# Patient Record
Sex: MALE | Race: WHITE | NOT HISPANIC OR LATINO | Employment: OTHER | URBAN - METROPOLITAN AREA
[De-identification: names, ages, dates, MRNs, and addresses within clinical notes are randomized per-mention and may not be internally consistent; named-entity substitution may affect disease eponyms.]

---

## 2017-01-06 ENCOUNTER — OFFICE VISIT (OUTPATIENT)
Dept: ENDOCRINOLOGY | Age: 77
End: 2017-01-06

## 2017-01-06 VITALS
HEIGHT: 70 IN | HEART RATE: 54 BPM | BODY MASS INDEX: 38.57 KG/M2 | OXYGEN SATURATION: 98 % | WEIGHT: 269.4 LBS | DIASTOLIC BLOOD PRESSURE: 80 MMHG | SYSTOLIC BLOOD PRESSURE: 110 MMHG

## 2017-01-06 DIAGNOSIS — IMO0002 UNCONTROLLED TYPE 2 DIABETES MELLITUS WITH COMPLICATION, WITH LONG-TERM CURRENT USE OF INSULIN: Primary | ICD-10-CM

## 2017-01-06 DIAGNOSIS — N18.30 CHRONIC KIDNEY DISEASE, STAGE 3 (MODERATE): ICD-10-CM

## 2017-01-06 DIAGNOSIS — Z79.4 ENCOUNTER FOR LONG-TERM (CURRENT) USE OF INSULIN (HCC): ICD-10-CM

## 2017-01-06 DIAGNOSIS — R63.5 ABNORMAL WEIGHT GAIN: ICD-10-CM

## 2017-01-06 DIAGNOSIS — E16.1 HYPERINSULINISM: ICD-10-CM

## 2017-01-06 DIAGNOSIS — IMO0002 UNCONTROLLED TYPE II DIABETES MELLITUS WITH NEPHROPATHY: ICD-10-CM

## 2017-01-06 PROCEDURE — 99214 OFFICE O/P EST MOD 30 MIN: CPT | Performed by: INTERNAL MEDICINE

## 2017-01-06 NOTE — PROGRESS NOTES
76 y.o.    Patient Care Team:  Lopez Salas MD as PCP - General (Internal Medicine)    Chief Complaint:      F/U TYPE 2 DIABETES, UNCONTROLLED.  NO RECENT LABS.  Subjective     HPI  76 yr old white male with PMHx of uncontrolled T2DM came for followup  Currently on Lantus 50 units at hs and Novolog 6 units tidac  Reports Bg have improved significantly  No hypoglycemia recently but fasting BG have been trending down    UCT2DM with neuropathy  Symptomatic in both feet  UCT2DM with neprhopathy and elevated creatinine  Abnormal weight gain  Not able to lose weight yet  Started walking regularly and wants to join [facility]    Interval History  Patient 75-year-old white male with a past medical history of uncontrolled type 2 diabetes mellitus came for followup.  He's a former patient of Dr. Mendosa  Summary  Patient was diagnosed with uncontrolled type 2 diabetes mellitus in 1997 and has been on medication ever since.      He currently takes Lantus 50 units at night. No other medication for diabetes. He used to be on Byetta and Humalog but apparently due to insurance issues he discontinued both of them. He has been focused on diet and activity and exercise and is managing to keep his blood sugars below 120. For the past few months.. 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 He has no sores or lesions on the feet      Uncontrolled type 2 diabetes mellitus with nephropathy and chronic kidney disease.  Patient has regular follow-up with a nephrologist     Patient denied any knowledge of diabetic retinopathy.  Patient had occasional episodes of hypoglycemia with a blood sugar dropping and to 70s and he becomes symptomatic  He started walking 2 miles every other day and has been controlling his diet very strictly.      Abnormal weight  gain  Patient is also gained significant weight over the past several years. He currently weighs 274 pounds and is actively planning to lose weight    The following portions of the patient's history were reviewed and updated as appropriate: allergies, current medications, past family history, past medical history, past social history, past surgical history and problem list.    Past Medical History   Diagnosis Date   • CKD (chronic kidney disease)    • Hyperlipidemia    • Hypertension    • Type 2 diabetes mellitus      Family History   Problem Relation Age of Onset   • Cancer Mother    • Heart disease Father      Social History     Social History   • Marital status:      Spouse name: N/A   • Number of children: N/A   • Years of education: N/A     Occupational History   • Not on file.     Social History Main Topics   • Smoking status: Former Smoker   • Smokeless tobacco: Not on file   • Alcohol use No   • Drug use: No   • Sexual activity: Not on file     Other Topics Concern   • Not on file     Social History Narrative     Allergies   Allergen Reactions   • Morphine And Related    • Nabumetone Rash       Current Outpatient Prescriptions:   •  aspirin 325 MG tablet, Take  by mouth daily., Disp: , Rfl:   •  atenolol (TENORMIN) 25 MG tablet, Take 1 tablet by mouth daily., Disp: 90 tablet, Rfl: 1  •  atorvastatin (LIPITOR) 40 MG tablet, Take 1 tablet by mouth daily., Disp: 90 tablet, Rfl: 3  •  fluticasone (FLONASE) 50 MCG/ACT nasal spray, , Disp: , Rfl:   •  furosemide (LASIX) 20 MG tablet, Take 1 tablet by mouth 2 (Two) Times a Day., Disp: 30 tablet, Rfl: 1  •  glucose blood test strip, OneTouch Ultra Blue In Vitro Strip; Patient Sig: OneTouch Ultra Blue In Vitro Strip USE 1 KIT; 0; 06-Mar-2014; Active, Disp: , Rfl:   •  insulin aspart (NOVOLOG FLEXPEN) 100 UNIT/ML solution pen-injector sc pen, Inject 6 Units under the skin 3 (three) times a day with meals., Disp: 10 pen, Rfl: 1  •  insulin glargine (LANTUS) 100  "UNIT/ML injection, Inject 50 Units under the skin., Disp: , Rfl:   •  Insulin Syringe-Needle U-100 30G X 1/2\" 0.5 ML misc, , Disp: , Rfl:   •  Multiple Vitamin (MULTI VITAMIN DAILY PO), Take  by mouth., Disp: , Rfl:   •  potassium chloride (KLOR-CON) 10 MEQ CR tablet, Take 1 tablet by mouth Daily., Disp: 30 tablet, Rfl: 1        Review of Systems   Constitutional: Negative for chills, fatigue and fever.   Cardiovascular: Negative for chest pain and palpitations.   Gastrointestinal: Negative for abdominal pain, constipation, diarrhea, nausea and vomiting.   Endocrine: Negative for cold intolerance and heat intolerance.   All other systems reviewed and are negative.      Objective       Vitals:    01/06/17 1034   BP: 110/80   Pulse: 54   SpO2: 98%   Weight: 269 lb 6.4 oz (122 kg)   Height: 70\" (177.8 cm)     Body mass index is 38.65 kg/(m^2).      Physical Exam   Constitutional: He is oriented to person, place, and time. He appears well-developed and well-nourished.   HENT:   Head: Normocephalic and atraumatic.   Eyes: EOM are normal. Pupils are equal, round, and reactive to light.   Neck: Normal range of motion. Neck supple. No thyromegaly present.   Cardiovascular: Normal rate, regular rhythm, normal heart sounds and intact distal pulses.    Pulmonary/Chest: Effort normal and breath sounds normal.   Abdominal: Soft. Bowel sounds are normal. He exhibits distension. There is no tenderness.   Lymphadenopathy:     He has no cervical adenopathy.   Neurological: He is alert and oriented to person, place, and time. He has normal reflexes.   Skin: Skin is warm and dry.   Psychiatric: He has a normal mood and affect. His behavior is normal.   Nursing note and vitals reviewed.    Results Review:     I reviewed the patient's new clinical results.    Medical records reviewed  Summary:      Office Visit on 09/02/2016   Component Date Value Ref Range Status   • Glucose 09/02/2016 135* 65 - 99 mg/dL Final   • BUN 09/02/2016 25  8 " - 27 mg/dL Final   • Creatinine 09/02/2016 1.24  0.76 - 1.27 mg/dL Final   • eGFR Non  Am 09/02/2016 56* >59 mL/min/1.73 Final   • eGFR African Am 09/02/2016 65  >59 mL/min/1.73 Final   • BUN/Creatinine Ratio 09/02/2016 20  10 - 22 Final   • Sodium 09/02/2016 138  134 - 144 mmol/L Final   • Potassium 09/02/2016 4.9  3.5 - 5.2 mmol/L Final   • Chloride 09/02/2016 102  97 - 108 mmol/L Final   • Total CO2 09/02/2016 20  18 - 29 mmol/L Final   • Calcium 09/02/2016 10.0  8.6 - 10.2 mg/dL Final   • Total Protein 09/02/2016 6.5  6.0 - 8.5 g/dL Final   • Albumin 09/02/2016 4.2  3.5 - 4.8 g/dL Final   • Globulin 09/02/2016 2.3  1.5 - 4.5 g/dL Final   • A/G Ratio 09/02/2016 1.8  1.1 - 2.5 Final   • Total Bilirubin 09/02/2016 1.2  0.0 - 1.2 mg/dL Final   • Alkaline Phosphatase 09/02/2016 83  39 - 117 IU/L Final   • AST (SGOT) 09/02/2016 22  0 - 40 IU/L Final   • ALT (SGPT) 09/02/2016 19  0 - 44 IU/L Final   • Hemoglobin A1C 09/02/2016 8.6* 4.8 - 5.6 % Final    Comment:          Pre-diabetes: 5.7 - 6.4           Diabetes: >6.4           Glycemic control for adults with diabetes: <7.0     • Creatinine, Urine 09/02/2016 94.1  Not Estab. mg/dL Final   • Microalbumin, Urine 09/02/2016 48.3  Not Estab. ug/mL Final   • Microalbumin/Creatinine Ratio Urine 09/02/2016 51.3* 0.0 - 30.0 mg/g creat Final     Lab Results   Component Value Date    HGBA1C 8.6 (H) 09/02/2016    HGBA1C 8.4 (H) 06/02/2016    HGBA1C 8.2 (H) 03/18/2016     Lab Results   Component Value Date    MICROALBUR 48.3 09/02/2016    CREATININE 1.24 09/02/2016     Imaging Results (most recent)     None                Assessment and Plan:    Claude was seen today for diabetes.    Diagnoses and all orders for this visit:    Uncontrolled type 2 diabetes mellitus with complication, with long-term current use of insulin  -     Comprehensive Metabolic Panel  -     Hemoglobin A1c  -     TSH  -     Microalbumin / Creatinine Urine Ratio    Uncontrolled type II diabetes  mellitus with nephropathy  -     Comprehensive Metabolic Panel  -     Hemoglobin A1c  -     TSH  -     Microalbumin / Creatinine Urine Ratio    Chronic kidney disease, stage 3 (moderate)  -     Comprehensive Metabolic Panel  -     Hemoglobin A1c  -     TSH  -     Microalbumin / Creatinine Urine Ratio    Hyperinsulinism  -     Comprehensive Metabolic Panel  -     Hemoglobin A1c  -     TSH  -     Microalbumin / Creatinine Urine Ratio    Encounter for long-term (current) use of insulin  -     Comprehensive Metabolic Panel  -     Hemoglobin A1c  -     TSH  -     Microalbumin / Creatinine Urine Ratio    Abnormal weight gain  -     Comprehensive Metabolic Panel  -     Hemoglobin A1c  -     TSH  -     Microalbumin / Creatinine Urine Ratio        Patient's glucometer download reviewed  Blood sugars are fluctuating from  for the most part  They appeared to be much improved  He is able to take NovoLog 6 units before each meal and reports that his blood sugars have improved    I advised him to decrease the Lantus to 44 units at night  Continue NovoLog 6 units before each meal    Patient is currently focused on weight loss  He plans to join [employer]  He is currently exercising 40 minutes every day.    Patient will get lab testing done today  He will return to follow-up in 3 months      Layton Jolly MD. FACE    01/06/17      EMR Dragon / transcription disclaimer:    Much of this encounter note is an electronic transcription/ translation of spoken language to printed text.  Electronic translation of spoken language may permit erroneous, or at times, nonsensical words or phrases to be inadvertently transcribed; although I have reviewed the note for such errors, some may still exist.

## 2017-01-06 NOTE — PATIENT INSTRUCTIONS
Patient instructions  Decrease Lantus to 44 units at bedtime  Continue NovoLog 6 units before each meal

## 2017-01-06 NOTE — MR AVS SNAPSHOT
"                        Claude E Johnson   1/6/2017 10:30 AM   Office Visit    Dept Phone:  [phone]   Encounter #:  [id number]    Provider:  Layton FISHMAN MD   Department:  [facility] ENDOCRINOLOGY                Your Full Care Plan              Your Updated Medication List          This list is accurate as of: 1/6/17 11:24 AM.  Always use your most recent med list.                aspirin 325 MG tablet       atenolol 25 MG tablet   Commonly known as:  TENORMIN   Take 1 tablet by mouth daily.       atorvastatin 40 MG tablet   Commonly known as:  LIPITOR   Take 1 tablet by mouth daily.       fluticasone 50 MCG/ACT nasal spray   Commonly known as:  FLONASE       furosemide 20 MG tablet   Commonly known as:  LASIX   Take 1 tablet by mouth 2 (Two) Times a Day.       glucose blood test strip       insulin aspart 100 UNIT/ML solution pen-injector sc pen   Commonly known as:  NOVOLOG FLEXPEN   Inject 6 Units under the skin 3 (three) times a day with meals.       insulin glargine 100 UNIT/ML injection   Commonly known as:  LANTUS       Insulin Syringe-Needle U-100 30G X 1/2\" 0.5 ML misc       MULTI VITAMIN DAILY PO       potassium chloride 10 MEQ CR tablet   Commonly known as:  KLOR-CON   Take 1 tablet by mouth Daily.               We Performed the Following     Comprehensive Metabolic Panel     Hemoglobin A1c     Microalbumin / Creatinine Urine Ratio     TSH       You Were Diagnosed With        Codes Comments    Uncontrolled type 2 diabetes mellitus with complication, with long-term current use of insulin    -  Primary ICD-10-CM: E11.8, E11.65, Z79.4  ICD-9-CM: 250.82, V58.67     Uncontrolled type II diabetes mellitus with nephropathy     ICD-10-CM: E11.21, E11.65  ICD-9-CM: 250.42, 583.81     Chronic kidney disease, stage 3 (moderate)     ICD-10-CM: N18.3  ICD-9-CM: 585.3     Hyperinsulinism     ICD-10-CM: E16.1  ICD-9-CM: 251.1     Encounter for long-term (current) use of insulin     " ICD-10-CM: Z79.4  ICD-9-CM: V58.67     Abnormal weight gain     ICD-10-CM: R63.5  ICD-9-CM: 783.1       Instructions    Patient instructions  Decrease Lantus to 44 units at bedtime  Continue NovoLog 6 units before each meal     Patient Instructions History      Upcoming Appointments     Visit Type Date Time Department    OFFICE VISIT 2017 10:30 AM [facility]    FOLLOW UP 2017  9:45 AM [facility]    OFFICE VISIT 2017  1:15 PM [facility]      [employer] Signup     [facility] [employer] allows you to send messages to your doctor, view your test results, renew your prescriptions, schedule appointments, and more. To sign up, go to [employer] and click on the Sign Up Now link in the New User? box. Enter your [employer] Activation Code exactly as it appears below along with the last four digits of your Social Security Number and your Date of Birth () to complete the sign-up process. If you do not sign up before the expiration date, you must request a new code.    [employer] Activation Code: [email or web address]  Expires: 2017 11:23 AM    If you have questions, you can email [email or web address] or call [phone] to talk to our [employer] staff. Remember, [employer] is NOT to be used for urgent needs. For medical emergencies, dial 911.               Other Info from Your Visit           Your Appointments     2017  9:45 AM EST   Follow Up with ESTEE Lux   [facility] INTERNAL MEDICINE (--)    [address]  [facility] [address] [phone]           Arrive 15 minutes prior to appointment.            2017  1:15 PM EDT   Office Visit with Layton FISHMAN MD   [facility] ENDOCRINOLOGY (--)    [address]  [facility] [address] [phone]           Arrive 15 minutes prior to appointment.              Allergies     Morphine And Related      Nabumetone  Rash      Reason for  "Visit     Diabetes           Vital Signs     Blood Pressure Pulse Height Weight Oxygen Saturation Body Mass Index    110/80 54 70\" (177.8 cm) 269 lb 6.4 oz (122 kg) 98% 38.65 kg/m2    Smoking Status                   Former Smoker           Problems and Diagnoses Noted     Abnormal weight gain    Chronic kidney disease    Encounter for long-term (current) use of insulin    Hyperinsulinism    Uncontrolled type 2 diabetes mellitus with complication, with long-term current use of insulin    Uncontrolled type II diabetes mellitus with nephropathy        "

## 2017-01-07 LAB
ALBUMIN SERPL-MCNC: 4.4 G/DL (ref 3.5–5.2)
ALBUMIN/CREAT UR: 65.5 MG/G CREAT (ref 0–30)
ALBUMIN/GLOB SERPL: 2.1 G/DL
ALP SERPL-CCNC: 84 U/L (ref 39–117)
ALT SERPL-CCNC: 19 U/L (ref 1–41)
AST SERPL-CCNC: 15 U/L (ref 1–40)
BILIRUB SERPL-MCNC: 0.6 MG/DL (ref 0.1–1.2)
BUN SERPL-MCNC: 34 MG/DL (ref 8–23)
BUN/CREAT SERPL: 29.1 (ref 7–25)
CALCIUM SERPL-MCNC: 9.7 MG/DL (ref 8.6–10.5)
CHLORIDE SERPL-SCNC: 108 MMOL/L (ref 98–107)
CO2 SERPL-SCNC: 23.4 MMOL/L (ref 22–29)
CREAT SERPL-MCNC: 1.17 MG/DL (ref 0.76–1.27)
CREAT UR-MCNC: 101 MG/DL
GLOBULIN SER CALC-MCNC: 2.1 GM/DL
GLUCOSE SERPL-MCNC: 95 MG/DL (ref 65–99)
HBA1C MFR BLD: 7 % (ref 4.8–5.6)
MICROALBUMIN UR-MCNC: 66.2 UG/ML
POTASSIUM SERPL-SCNC: 5.1 MMOL/L (ref 3.5–5.2)
PROT SERPL-MCNC: 6.5 G/DL (ref 6–8.5)
SODIUM SERPL-SCNC: 145 MMOL/L (ref 136–145)
TSH SERPL DL<=0.005 MIU/L-ACNC: 2.92 MIU/ML (ref 0.27–4.2)

## 2017-02-06 ENCOUNTER — OFFICE VISIT (OUTPATIENT)
Dept: INTERNAL MEDICINE | Facility: CLINIC | Age: 77
End: 2017-02-06

## 2017-02-06 VITALS
DIASTOLIC BLOOD PRESSURE: 82 MMHG | OXYGEN SATURATION: 98 % | WEIGHT: 267 LBS | HEIGHT: 70 IN | SYSTOLIC BLOOD PRESSURE: 124 MMHG | BODY MASS INDEX: 38.22 KG/M2 | HEART RATE: 45 BPM

## 2017-02-06 DIAGNOSIS — E78.00 HYPERCHOLESTEROLEMIA: ICD-10-CM

## 2017-02-06 DIAGNOSIS — G47.00 INSOMNIA, UNSPECIFIED TYPE: ICD-10-CM

## 2017-02-06 DIAGNOSIS — I10 HYPERTENSION, BENIGN: Primary | ICD-10-CM

## 2017-02-06 DIAGNOSIS — Z23 NEED FOR STREPTOCOCCUS PNEUMONIAE VACCINATION: ICD-10-CM

## 2017-02-06 DIAGNOSIS — E11.40 TYPE 2 DIABETES MELLITUS WITH DIABETIC NEUROPATHY, WITH LONG-TERM CURRENT USE OF INSULIN (HCC): ICD-10-CM

## 2017-02-06 DIAGNOSIS — Z79.4 TYPE 2 DIABETES MELLITUS WITH DIABETIC NEUROPATHY, WITH LONG-TERM CURRENT USE OF INSULIN (HCC): ICD-10-CM

## 2017-02-06 PROCEDURE — 99214 OFFICE O/P EST MOD 30 MIN: CPT | Performed by: NURSE PRACTITIONER

## 2017-02-06 RX ORDER — NIACIN 500 MG/1
TABLET, EXTENDED RELEASE ORAL
COMMUNITY
Start: 2017-01-22 | End: 2017-04-17 | Stop reason: SDUPTHER

## 2017-02-07 NOTE — PROGRESS NOTES
Subjective   Claude E Johnson is a 76 y.o. male.         HPI Comments: His glucose has improved dramatically on current regimen, denies hypoglycemic episodes.  His biggest complaint is frequent awakenings throughout the night (usually to urinate) with difficulty going back to sleep. He has tried several medications and has declined additional meds for now. He reports feeling well throughout the day without lingering fatigue and malaise.    Hypertension   This is a chronic problem. The current episode started more than 1 year ago. The problem is unchanged. The problem is controlled. Pertinent negatives include no anxiety, chest pain, headaches, palpitations or peripheral edema. There are no associated agents to hypertension. Past treatments include beta blockers. The current treatment provides significant improvement. There are no compliance problems.    Hyperlipidemia   This is a chronic problem. The current episode started more than 1 year ago. The problem is controlled. Pertinent negatives include no chest pain. Current antihyperlipidemic treatment includes statins (tolerating the atorvastatin). The current treatment provides significant improvement of lipids. There are no compliance problems (denies myalgias with med).  Risk factors for coronary artery disease include diabetes mellitus.   Diabetes   He presents for his follow-up diabetic visit. He has type 2 diabetes mellitus. His disease course has been improving. There are no hypoglycemic associated symptoms. Pertinent negatives for hypoglycemia include no headaches. Associated symptoms include foot paresthesias. Pertinent negatives for diabetes include no chest pain, no fatigue and no weakness. Symptoms are improving.        The following portions of the patient's history were reviewed and updated as appropriate: allergies, current medications, past social history and problem list.    Past Medical History   Diagnosis Date   • CKD (chronic kidney disease)    •  "Hyperlipidemia    • Hypertension    • Type 2 diabetes mellitus          Current Outpatient Prescriptions:   •  aspirin 325 MG tablet, Take  by mouth daily., Disp: , Rfl:   •  atenolol (TENORMIN) 25 MG tablet, Take 1 tablet by mouth daily., Disp: 90 tablet, Rfl: 1  •  atorvastatin (LIPITOR) 40 MG tablet, Take 1 tablet by mouth daily., Disp: 90 tablet, Rfl: 3  •  fluticasone (FLONASE) 50 MCG/ACT nasal spray, , Disp: , Rfl:   •  furosemide (LASIX) 20 MG tablet, Take 1 tablet by mouth 2 (Two) Times a Day., Disp: 30 tablet, Rfl: 1  •  glucose blood test strip, OneTouch Ultra Blue In Vitro Strip; Patient Sig: OneTouch Ultra Blue In Vitro Strip USE 1 KIT; 0; 06-Mar-2014; Active, Disp: , Rfl:   •  insulin aspart (NOVOLOG FLEXPEN) 100 UNIT/ML solution pen-injector sc pen, Inject 6 Units under the skin 3 (three) times a day with meals., Disp: 10 pen, Rfl: 1  •  insulin glargine (LANTUS) 100 UNIT/ML injection, Inject 50 Units under the skin. 44 units daily, Disp: , Rfl:   •  Insulin Syringe-Needle U-100 30G X 1/2\" 0.5 ML misc, , Disp: , Rfl:   •  Multiple Vitamin (MULTI VITAMIN DAILY PO), Take  by mouth., Disp: , Rfl:   •  niacin (NIASPAN) 500 MG CR tablet, , Disp: , Rfl:   •  potassium chloride (KLOR-CON) 10 MEQ CR tablet, Take 1 tablet by mouth Daily., Disp: 30 tablet, Rfl: 1    Allergies   Allergen Reactions   • Morphine And Related    • Nabumetone Rash       Review of Systems   Constitutional: Negative for chills, fatigue, fever and unexpected weight change.   HENT: Negative for congestion, ear pain, postnasal drip, sinus pressure, sore throat and trouble swallowing.    Eyes: Negative for visual disturbance.   Respiratory: Negative for cough, chest tightness and wheezing.    Cardiovascular: Negative for chest pain, palpitations and leg swelling.   Gastrointestinal: Negative for abdominal pain, blood in stool, nausea and vomiting.   Endocrine: Negative for cold intolerance and heat intolerance.   Genitourinary: Negative for " "dysuria, frequency and urgency.   Musculoskeletal: Negative for arthralgias and joint swelling.   Skin: Negative for color change.   Allergic/Immunologic: Negative for immunocompromised state.   Neurological: Negative for syncope, weakness and headaches.   Hematological: Does not bruise/bleed easily.       Objective   Vitals:    02/06/17 0942   BP: 124/82   BP Location: Left arm   Patient Position: Sitting   Cuff Size: Adult   Pulse: (!) 45   SpO2: 98%   Weight: 267 lb (121 kg)   Height: 70\" (177.8 cm)     Physical Exam   Constitutional: He is oriented to person, place, and time. He appears well-developed and well-nourished. No distress.   HENT:   Head: Normocephalic and atraumatic.   Right Ear: External ear normal.   Left Ear: External ear normal.   Eyes: Conjunctivae are normal.   Neck: Normal range of motion. Neck supple.   Cardiovascular: Normal rate, regular rhythm, normal heart sounds and intact distal pulses.  Exam reveals no gallop and no friction rub.    No murmur heard.  Pulmonary/Chest: Effort normal and breath sounds normal. No respiratory distress.   Lymphadenopathy:     He has no cervical adenopathy.   Neurological: He is alert and oriented to person, place, and time.   Skin: Skin is warm and dry. No rash noted. No erythema.   Psychiatric: He has a normal mood and affect. His behavior is normal.   Nursing note and vitals reviewed.      Assessment/Plan   Claude was seen today for hypertension, hyperlipidemia and diabetes.    Diagnoses and all orders for this visit:    Hypertension, benign  Comments:  stable on current meds    Hypercholesterolemia  Comments:  Lipid panel 6/2016 shows LDL of 71    Type 2 diabetes mellitus with diabetic neuropathy, with long-term current use of insulin  Comments:  recent A1c improved to 7.0    Insomnia, unspecified type  Comments:  discussed, has declined further meds for now    Need for Streptococcus pneumoniae vaccination             "

## 2017-03-03 ENCOUNTER — TELEPHONE (OUTPATIENT)
Dept: ENDOCRINOLOGY | Age: 77
End: 2017-03-03

## 2017-03-03 DIAGNOSIS — IMO0002 UNCONTROLLED TYPE 2 DIABETES MELLITUS WITH COMPLICATION, WITH LONG-TERM CURRENT USE OF INSULIN: Primary | ICD-10-CM

## 2017-03-03 NOTE — TELEPHONE ENCOUNTER
"----- Message from Beverly Dobbins sent at 3/2/2017  3:01 PM EST -----  Contact: patient  Insulin Syringe-Needle U-100 30G X 1/2\" 0.5 ML misc    90day supply with 3 refills    [facility] - [place], [place] - [address] AT Portal to Registered [facility] - [phone]  - [phone] (Phone)  [phone] (Fax)          SCRIPT SENT  "

## 2017-03-21 ENCOUNTER — TELEPHONE (OUTPATIENT)
Dept: ENDOCRINOLOGY | Age: 77
End: 2017-03-21

## 2017-03-21 RX ORDER — INSULIN GLARGINE 100 [IU]/ML
50 INJECTION, SOLUTION SUBCUTANEOUS NIGHTLY
Qty: 5 EACH | Refills: 1 | Status: SHIPPED | OUTPATIENT
Start: 2017-03-21 | End: 2017-03-22 | Stop reason: SDUPTHER

## 2017-03-21 NOTE — TELEPHONE ENCOUNTER
----- Message from Sindi Nunez sent at 3/20/2017 10:13 AM EDT -----  Contact: PATIENT  THE PATIENT IS ASKING FOR A REFILL OF HIS LANTUS 100 UNIT VIALS TO BE SENT TO HIS PHARMACY. HE USES 50 UNITS DAILY AND GET A QUANTITY OF 5 VIALS AT A TIME. PLEASE SEND TO:  [facility]  [place], [place] - [address]   [phone] (Phone)  [phone] (Fax)          SCRIPT SENT

## 2017-03-22 ENCOUNTER — TELEPHONE (OUTPATIENT)
Dept: ENDOCRINOLOGY | Age: 77
End: 2017-03-22

## 2017-03-22 RX ORDER — INSULIN GLARGINE 100 [IU]/ML
44 INJECTION, SOLUTION SUBCUTANEOUS NIGHTLY
Qty: 5 EACH | Refills: 1 | Status: SHIPPED | OUTPATIENT
Start: 2017-03-22 | End: 2017-03-23 | Stop reason: SDUPTHER

## 2017-03-22 NOTE — TELEPHONE ENCOUNTER
----- Message from Beverly Dobbins sent at 3/22/2017  9:55 AM EDT -----  Contact: [facility]  PHARMCAY  REFERNCE NUMBER   insulin glargine (LANTUS) 100 UNIT/ML injection       Inject 50 Units under the skin Every Night. 44 units daily    NEEDS CLARIFICATION OF  DIRECTIONS           SCRIPT RESENT

## 2017-03-23 ENCOUNTER — TELEPHONE (OUTPATIENT)
Dept: INTERNAL MEDICINE | Facility: CLINIC | Age: 77
End: 2017-03-23

## 2017-03-23 RX ORDER — FLUTICASONE PROPIONATE 50 MCG
2 SPRAY, SUSPENSION (ML) NASAL DAILY
Qty: 1 EACH | Refills: 3 | Status: SHIPPED | OUTPATIENT
Start: 2017-03-23 | End: 2018-01-02 | Stop reason: SDUPTHER

## 2017-03-23 RX ORDER — ATORVASTATIN CALCIUM 40 MG/1
40 TABLET, FILM COATED ORAL DAILY
Qty: 90 TABLET | Refills: 3 | Status: SHIPPED | OUTPATIENT
Start: 2017-03-23 | End: 2018-02-09 | Stop reason: SDUPTHER

## 2017-03-23 RX ORDER — INSULIN GLARGINE 100 [IU]/ML
44 INJECTION, SOLUTION SUBCUTANEOUS NIGHTLY
Qty: 5 EACH | Refills: 1 | Status: SHIPPED | OUTPATIENT
Start: 2017-03-23 | End: 2017-09-29 | Stop reason: SDUPTHER

## 2017-03-23 NOTE — TELEPHONE ENCOUNTER
----- Message from Mary Obregon sent at 3/23/2017 10:40 AM EDT -----  Contact: pt - Dr Salas' pt - RE: Rx refills  Pt calling and would like a refill on Rx    fluticasone (FLONASE) 50 MCG/ACT nasal spray     3    atorvastatin (LIPITOR) 40 MG tablet 90 tablet 3      Sig - Route: Take 1 tablet by mouth daily. - Oral    [facility] - [place], [place] - [facility] [address] AT Portal to Registered [facility] Sites - [phone]  - [phone] (Phone)  [phone] (Fax)    Pt [phone]

## 2017-04-17 ENCOUNTER — TELEPHONE (OUTPATIENT)
Dept: INTERNAL MEDICINE | Facility: CLINIC | Age: 77
End: 2017-04-17

## 2017-04-17 DIAGNOSIS — I10 HYPERTENSION, BENIGN: ICD-10-CM

## 2017-04-17 RX ORDER — ATENOLOL 25 MG/1
25 TABLET ORAL DAILY
Qty: 90 TABLET | Refills: 1 | Status: SHIPPED | OUTPATIENT
Start: 2017-04-17 | End: 2017-10-31 | Stop reason: SDUPTHER

## 2017-04-17 RX ORDER — NIACIN 500 MG/1
500 TABLET, EXTENDED RELEASE ORAL DAILY
Qty: 90 TABLET | Refills: 3 | Status: SHIPPED | OUTPATIENT
Start: 2017-04-17 | End: 2017-11-15

## 2017-04-17 NOTE — TELEPHONE ENCOUNTER
----- Message from Helen Lee MA sent at 4/17/2017  2:31 PM EDT -----  Pt calling for refill    Niacin  Atenolol    [facility]

## 2017-05-09 ENCOUNTER — OFFICE VISIT (OUTPATIENT)
Dept: ENDOCRINOLOGY | Age: 77
End: 2017-05-09

## 2017-05-09 VITALS
SYSTOLIC BLOOD PRESSURE: 134 MMHG | BODY MASS INDEX: 37.56 KG/M2 | OXYGEN SATURATION: 98 % | HEART RATE: 59 BPM | HEIGHT: 70 IN | WEIGHT: 262.4 LBS | DIASTOLIC BLOOD PRESSURE: 70 MMHG

## 2017-05-09 DIAGNOSIS — R63.5 ABNORMAL WEIGHT GAIN: ICD-10-CM

## 2017-05-09 DIAGNOSIS — Z79.4 ENCOUNTER FOR LONG-TERM (CURRENT) USE OF INSULIN (HCC): ICD-10-CM

## 2017-05-09 DIAGNOSIS — IMO0002 UNCONTROLLED TYPE II DIABETES MELLITUS WITH NEPHROPATHY: ICD-10-CM

## 2017-05-09 DIAGNOSIS — IMO0002 UNCONTROLLED TYPE 2 DIABETES MELLITUS WITH COMPLICATION, WITH LONG-TERM CURRENT USE OF INSULIN: Primary | ICD-10-CM

## 2017-05-09 PROCEDURE — 99214 OFFICE O/P EST MOD 30 MIN: CPT | Performed by: INTERNAL MEDICINE

## 2017-05-10 LAB
ALBUMIN SERPL-MCNC: 4.1 G/DL (ref 3.5–5.2)
ALBUMIN/CREAT UR: 158.4 MG/G CREAT (ref 0–30)
ALBUMIN/GLOB SERPL: 1.5 G/DL
ALP SERPL-CCNC: 85 U/L (ref 39–117)
ALT SERPL-CCNC: 26 U/L (ref 1–41)
AST SERPL-CCNC: 18 U/L (ref 1–40)
BILIRUB SERPL-MCNC: 0.9 MG/DL (ref 0.1–1.2)
BUN SERPL-MCNC: 31 MG/DL (ref 8–23)
BUN/CREAT SERPL: 24.8 (ref 7–25)
CALCIUM SERPL-MCNC: 9.8 MG/DL (ref 8.6–10.5)
CHLORIDE SERPL-SCNC: 105 MMOL/L (ref 98–107)
CO2 SERPL-SCNC: 24.6 MMOL/L (ref 22–29)
CREAT SERPL-MCNC: 1.25 MG/DL (ref 0.76–1.27)
CREAT UR-MCNC: 106.7 MG/DL
GLOBULIN SER CALC-MCNC: 2.7 GM/DL
GLUCOSE SERPL-MCNC: 128 MG/DL (ref 65–99)
HBA1C MFR BLD: 7.83 % (ref 4.8–5.6)
MICROALBUMIN UR-MCNC: 169 UG/ML
POTASSIUM SERPL-SCNC: 5 MMOL/L (ref 3.5–5.2)
PROT SERPL-MCNC: 6.8 G/DL (ref 6–8.5)
SODIUM SERPL-SCNC: 143 MMOL/L (ref 136–145)

## 2017-06-29 ENCOUNTER — HOSPITAL ENCOUNTER (EMERGENCY)
Facility: HOSPITAL | Age: 77
Discharge: HOME | End: 2017-06-29
Attending: FAMILY MEDICINE | Admitting: FAMILY MEDICINE

## 2017-06-29 VITALS
TEMPERATURE: 97.4 F | BODY MASS INDEX: 37.22 KG/M2 | HEIGHT: 70 IN | WEIGHT: 260 LBS | SYSTOLIC BLOOD PRESSURE: 160 MMHG | DIASTOLIC BLOOD PRESSURE: 91 MMHG | HEART RATE: 70 BPM | OXYGEN SATURATION: 96 % | RESPIRATION RATE: 18 BRPM

## 2017-06-29 DIAGNOSIS — S39.012A LOW BACK STRAIN, INITIAL ENCOUNTER: Primary | ICD-10-CM

## 2017-06-29 PROCEDURE — 99283 EMERGENCY DEPT VISIT LOW MDM: CPT

## 2017-06-29 RX ORDER — OXYCODONE HYDROCHLORIDE AND ACETAMINOPHEN 5; 325 MG/1; MG/1
1 TABLET ORAL EVERY 4 HOURS PRN
Qty: 16 TABLET | Refills: 0 | Status: SHIPPED | OUTPATIENT
Start: 2017-06-29 | End: 2017-10-17

## 2017-06-29 RX ORDER — BACLOFEN 10 MG/1
10 TABLET ORAL 3 TIMES DAILY
Qty: 15 TABLET | Refills: 0 | Status: SHIPPED | OUTPATIENT
Start: 2017-06-29 | End: 2018-03-12

## 2017-06-30 ENCOUNTER — TELEPHONE (OUTPATIENT)
Dept: SOCIAL WORK | Facility: HOSPITAL | Age: 77
End: 2017-06-30

## 2017-07-05 RX ORDER — PEN NEEDLE, DIABETIC 31 G X1/4"
NEEDLE, DISPOSABLE MISCELLANEOUS
Qty: 20 EACH | Refills: 0 | Status: SHIPPED | OUTPATIENT
Start: 2017-07-05 | End: 2017-11-15

## 2017-07-05 RX ORDER — ELECTROLYTES/DEXTROSE
SOLUTION, ORAL ORAL
Qty: 300 EACH | Refills: 0 | Status: SHIPPED | OUTPATIENT
Start: 2017-07-05 | End: 2017-07-07 | Stop reason: SDUPTHER

## 2017-07-05 RX ORDER — ELECTROLYTES/DEXTROSE
SOLUTION, ORAL ORAL
Qty: 12 EACH | Refills: 0 | Status: SHIPPED | OUTPATIENT
Start: 2017-07-05 | End: 2017-07-05 | Stop reason: SDUPTHER

## 2017-07-05 NOTE — TELEPHONE ENCOUNTER
----- Message from Jenna Benavides sent at 2017  9:43 AM EDT -----  Contact: PATIENT  PATIENT SAID THE PEN NEEDLES HE WAS PRESCRIBED IS SO SHORT THAT HE CANNOT GET HIS INSULIN DELIVERED AND HIS BLOOD SUGAR HAS BEEN RUNNING BETWEEN 170  FOR ABOUT 4 DAYS.   HE STOPPED USED THE PEN NEEDLES ABOUT 3 DAYS AGO.  HE LEFT HIS METER AT [facility] AND DOES NOT HAVE READINGS AVAILABLE.      HE SAID HE IS SUPPOSED TO TAKE NOVOLOG PENS, 6 UNITS, 3XDAY AND LANTUS VIALS, 44 UNITS AT NIGHT.   HE SAID IN THE LAST 3 DAYS HE HAS BEEN USING LANTUS 10 UNITS BEFORE MEALS AND 44 UNITS AT NIGHT AND IT HAS BEEN BRINGING HIS BLOOD SUGAR DOWN.     ASKING FOR SCRIPT FOR BD PEN NEEDLES, 1/2 INCH, 3 XDAY FOR NOVOLOG ONLY, 90 DAY SUPPLY, [facility].     HE SAID HE IS [facility] RIGHT NOW FOR A  AND ASKING FOR SCRIPT FOR ABOUT 10 TO 12 NEEDLES TO BE SENT TO [facility], [address], [facility] [address], PH. [phone]. HE IS LEAVING Friday MORNING TO GO BACK TO [place], [place].   PT CAN BE REACHED [phone]        LE SENT SCRIPT

## 2017-07-05 NOTE — TELEPHONE ENCOUNTER
[facility] [place] He stated that he was not getting his insulin though the current pen needles he was using

## 2017-07-07 ENCOUNTER — TELEPHONE (OUTPATIENT)
Dept: ENDOCRINOLOGY | Age: 77
End: 2017-07-07

## 2017-07-07 RX ORDER — ELECTROLYTES/DEXTROSE
1 SOLUTION, ORAL ORAL 4 TIMES DAILY
Qty: 400 EACH | Refills: 1 | Status: SHIPPED | OUTPATIENT
Start: 2017-07-07 | End: 2017-07-10 | Stop reason: SDUPTHER

## 2017-07-07 RX ORDER — ELECTROLYTES/DEXTROSE
SOLUTION, ORAL ORAL
Qty: 300 EACH | Refills: 0 | Status: SHIPPED | OUTPATIENT
Start: 2017-07-07 | End: 2017-07-07 | Stop reason: SDUPTHER

## 2017-07-07 NOTE — TELEPHONE ENCOUNTER
----- Message from Edith Brandon sent at 7/7/2017 12:23 PM EDT -----  [facility] needs a New RX for BD ultra fine pen needles. [facility] regarding how many per day and supply amount  [facility]   [phone] reference number [phone]      SCRIPT SENT

## 2017-07-10 RX ORDER — ELECTROLYTES/DEXTROSE
1 SOLUTION, ORAL ORAL 4 TIMES DAILY
Qty: 400 EACH | Refills: 1 | Status: SHIPPED | OUTPATIENT
Start: 2017-07-10 | End: 2017-10-23 | Stop reason: SDUPTHER

## 2017-07-12 ENCOUNTER — OFFICE VISIT (OUTPATIENT)
Dept: INTERNAL MEDICINE | Facility: CLINIC | Age: 77
End: 2017-07-12

## 2017-07-12 VITALS
DIASTOLIC BLOOD PRESSURE: 82 MMHG | HEART RATE: 50 BPM | HEIGHT: 70 IN | OXYGEN SATURATION: 98 % | WEIGHT: 270 LBS | BODY MASS INDEX: 38.65 KG/M2 | SYSTOLIC BLOOD PRESSURE: 124 MMHG

## 2017-07-12 DIAGNOSIS — E11.40 TYPE 2 DIABETES MELLITUS WITH DIABETIC NEUROPATHY, WITH LONG-TERM CURRENT USE OF INSULIN (HCC): ICD-10-CM

## 2017-07-12 DIAGNOSIS — I10 HYPERTENSION, BENIGN: Primary | ICD-10-CM

## 2017-07-12 DIAGNOSIS — Z79.4 TYPE 2 DIABETES MELLITUS WITH DIABETIC NEUROPATHY, WITH LONG-TERM CURRENT USE OF INSULIN (HCC): ICD-10-CM

## 2017-07-12 DIAGNOSIS — E78.00 HYPERCHOLESTEROLEMIA: ICD-10-CM

## 2017-07-12 LAB
CHOLEST SERPL-MCNC: 137 MG/DL (ref 0–200)
HDLC SERPL-MCNC: 41 MG/DL (ref 40–60)
LDLC SERPL CALC-MCNC: 68 MG/DL (ref 0–100)
TRIGL SERPL-MCNC: 139 MG/DL (ref 0–150)
VLDLC SERPL-MCNC: 27.8 MG/DL (ref 5–40)

## 2017-07-12 PROCEDURE — G0438 PPPS, INITIAL VISIT: HCPCS | Performed by: NURSE PRACTITIONER

## 2017-07-12 PROCEDURE — 99213 OFFICE O/P EST LOW 20 MIN: CPT | Performed by: NURSE PRACTITIONER

## 2017-07-12 NOTE — PROGRESS NOTES
QUICK REFERENCE INFORMATION:  The ABCs of the Annual Wellness Visit    Subsequent Medicare Wellness Visit    HEALTH RISK ASSESSMENT    1940    Recent Hospitalizations:  Recently treated at the following:  [facility].  Seen at [facility], operated by [facility] 6/29 due to lumbar strain, low back gradually improving. He is taking Baclofen and Percocet only as needed (prescribed in ER).      Current Medical Providers:  Patient Care Team:  Lopez Salas MD as PCP - General (Internal Medicine)  ESTEE Lux as PCP - Claims Attributed  Yossi Saucedo MD as Consulting Physician (Cardiology)  Jordy Rene MD as Consulting Physician (Nephrology)        Smoking Status:  History   Smoking Status   • Former Smoker   Smokeless Tobacco   • Not on file       Alcohol Consumption:  History   Alcohol Use No       Depression Screen:   PHQ-9 Depression Screening 7/12/2017   Little interest or pleasure in doing things 0   Feeling down, depressed, or hopeless 0   Trouble falling or staying asleep, or sleeping too much 1   Feeling tired or having little energy 1   Poor appetite or overeating 0   Feeling bad about yourself - or that you are a failure or have let yourself or your family down 0   Trouble concentrating on things, such as reading the newspaper or watching television 0   Moving or speaking so slowly that other people could have noticed. Or the opposite - being so fidgety or restless that you have been moving around a lot more than usual 0   Thoughts that you would be better off dead, or of hurting yourself in some way 0   PHQ-9 Total Score 2   If you checked off any problems, how difficult have these problems made it for you to do your work, take care of things at home, or get along with other people? Not difficult at all       Health Habits and Functional and Cognitive Screening:  Functional & Cognitive Status 7/12/2017   Do you have difficulty preparing food and eating? No   Do you have  difficulty bathing yourself? No   Do you have difficulty getting dressed? No   Do you have difficulty using the toilet? No   Do you have difficulty moving around from place to place? No   In the past year have you fallen or experienced a near fall? No   Do you need help using the phone?  No   Are you deaf or do you have serious difficulty hearing?  No   Do you need help with transportation? No   Do you need help shopping? No   Do you need help preparing meals?  No   Do you need help with housework?  No   Do you need help with laundry? No   Do you need help taking your medications? No   Do you need help managing money? No   Do you have difficulty concentrating, remembering or making decisions? Yes       Health Habits  Current Diet: Well Balanced Diet  Dental Exam: Up to date  Eye Exam: Up to date  Exercise (times per week): 3 times per week  Current Exercise Activities Include: Walking      Does the patient have evidence of cognitive impairment? No    Aspirin use counseling: Taking ASA appropriately as indicated      Recent Lab Results:  CMP:  Lab Results   Component Value Date     (H) 05/09/2017    BUN 31 (H) 05/09/2017    CREATININE 1.25 05/09/2017    EGFRIFNONA 56 (L) 05/09/2017    EGFRIFAFRI 68 05/09/2017    BCR 24.8 05/09/2017     05/09/2017    K 5.0 05/09/2017    CO2 24.6 05/09/2017    CALCIUM 9.8 05/09/2017    PROTENTOTREF 6.8 05/09/2017    ALBUMIN 4.10 05/09/2017    LABGLOBREF 2.7 05/09/2017    LABIL2 1.5 05/09/2017    BILITOT 0.9 05/09/2017    ALKPHOS 85 05/09/2017    AST 18 05/09/2017    ALT 26 05/09/2017     Lipid Panel:  Lab Results   Component Value Date    TRIG 115 06/02/2016    HDL 37 (L) 06/02/2016    VLDL 23 06/02/2016     HbA1c:  Lab Results   Component Value Date    HGBA1C 7.83 (H) 05/09/2017       Visual Acuity:  No exam data present    Age-appropriate Screening Schedule:  Refer to the list below for future screening recommendations based on patient's age, sex and/or medical  conditions. Orders for these recommended tests are listed in the plan section. The patient has been provided with a written plan.    Health Maintenance   Topic Date Due   • TDAP/TD VACCINES (1 - Tdap) 09/06/1959   • LIPID PANEL  06/02/2017   • INFLUENZA VACCINE  08/01/2017   • DIABETIC FOOT EXAM  08/01/2017   • DIABETIC EYE EXAM  09/26/2017   • HEMOGLOBIN A1C  11/09/2017   • COLONOSCOPY  05/17/2019   • PNEUMOCOCCAL VACCINES (65+ LOW/MEDIUM RISK)  Completed   • ZOSTER VACCINE  Completed        Subjective   History of Present Illness    Claude E Johnson is a 76 y.o. male who presents for an Subsequent Wellness Visit.    The following portions of the patient's history were reviewed and updated as appropriate: allergies, current medications, past family history, past medical history, past social history, past surgical history and problem list.    Outpatient Medications Prior to Visit   Medication Sig Dispense Refill   • aspirin 325 MG tablet Take  by mouth daily.     • atenolol (TENORMIN) 25 MG tablet Take 1 tablet by mouth Daily. 90 tablet 1   • atorvastatin (LIPITOR) 40 MG tablet Take 1 tablet by mouth Daily. 90 tablet 3   • baclofen (LIORESAL) 10 MG tablet Take 1 tablet by mouth 3 (Three) Times a Day. 15 tablet 0   • fluticasone (FLONASE) 50 MCG/ACT nasal spray 2 sprays into each nostril Daily. 1 each 3   • glucose blood test strip OneTouch Ultra Blue In Vitro Strip; Patient Sig: OneTouch Ultra Blue In Vitro Strip USE 1 KIT; 0; 06-Mar-2014; Active     • insulin aspart (NOVOLOG FLEXPEN) 100 UNIT/ML solution pen-injector sc pen Inject 6 Units under the skin 3 (Three) Times a Day With Meals. 10 pen 1   • insulin glargine (LANTUS) 100 UNIT/ML injection Inject 44 Units under the skin Every Night. 5 each 1   • Insulin Pen Needle (PEN NEEDLES) 29G X 12MM misc Inject 1 each under the skin 4 (Four) Times a Day. Use with Insulin 400 each 1   • Insulin Pen Needle (PEN NEEDLES) 31G X 6 MM misc Use to inject insulin 20 each 0   •  "Insulin Pen Needle 32G X 4 MM misc TID  each 5   • Insulin Syringe-Needle U-100 30G X 1/2\" 0.5 ML misc 1 each 5 (Five) Times a Day. 450 each 2   • Multiple Vitamin (MULTI VITAMIN DAILY PO) Take  by mouth.     • niacin (NIASPAN) 500 MG CR tablet Take 1 tablet by mouth Daily. 90 tablet 3   • oxyCODONE-acetaminophen (PERCOCET) 5-325 MG per tablet Take 1 tablet by mouth Every 4 (Four) Hours As Needed (pain). 16 tablet 0     No facility-administered medications prior to visit.        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    Identification of Risk Factors:  Risk factors include: weight  and inactivity (is trying to follow a low-carb diet and gradually increase his exercise).    Review of Systems    Compared to one year ago, the patient feels his physical health is the same.  Compared to one year ago, the patient feels his mental health is the same.    Objective     Physical Exam    Vitals:    07/12/17 1022   BP: 124/82   BP Location: Left arm   Patient Position: Sitting   Cuff Size: Adult   Pulse: 50   SpO2: 98%   Weight: 270 lb (122 kg)   Height: 70\" (177.8 cm)   PainSc:   2   PainLoc: Back       Body mass index is 38.74 kg/(m^2).  Discussed the patient's BMI with him. The BMI is above average; " BMI management plan is completed.    Assessment/Plan   Patient Self-Management and Personalized Health Advice  The patient has been provided with information about: diet, exercise, weight management and fall prevention and preventive services including:   · Diabetes screening, see lab orders, Exercise counseling provided, Nutrition counseling provided.    Visit Diagnoses:    ICD-10-CM ICD-9-CM   1. Hypertension, benign I10 401.1   2. Hypercholesterolemia E78.00 272.0   3. Type 2 diabetes mellitus with diabetic neuropathy, with long-term current use of insulin E11.40 250.60    Z79.4 357.2     V58.67       Orders Placed This Encounter   Procedures   • Lipid Panel     Standing Status:   Future     Number of Occurrences:   1     Standing Expiration Date:   7/12/2018       Outpatient Encounter Prescriptions as of 7/12/2017   Medication Sig Dispense Refill   • aspirin 325 MG tablet Take  by mouth daily.     • atenolol (TENORMIN) 25 MG tablet Take 1 tablet by mouth Daily. 90 tablet 1   • atorvastatin (LIPITOR) 40 MG tablet Take 1 tablet by mouth Daily. 90 tablet 3   • baclofen (LIORESAL) 10 MG tablet Take 1 tablet by mouth 3 (Three) Times a Day. 15 tablet 0   • fluticasone (FLONASE) 50 MCG/ACT nasal spray 2 sprays into each nostril Daily. 1 each 3   • glucose blood test strip OneTouch Ultra Blue In Vitro Strip; Patient Sig: OneTouch Ultra Blue In Vitro Strip USE 1 KIT; 0; 06-Mar-2014; Active     • insulin aspart (NOVOLOG FLEXPEN) 100 UNIT/ML solution pen-injector sc pen Inject 6 Units under the skin 3 (Three) Times a Day With Meals. 10 pen 1   • insulin glargine (LANTUS) 100 UNIT/ML injection Inject 44 Units under the skin Every Night. 5 each 1   • Insulin Pen Needle (PEN NEEDLES) 29G X 12MM misc Inject 1 each under the skin 4 (Four) Times a Day. Use with Insulin 400 each 1   • Insulin Pen Needle (PEN NEEDLES) 31G X 6 MM misc Use to inject insulin 20 each 0   • Insulin Pen Needle 32G X 4 MM misc TID  each 5   •  "Insulin Syringe-Needle U-100 30G X 1/2\" 0.5 ML misc 1 each 5 (Five) Times a Day. 450 each 2   • Multiple Vitamin (MULTI VITAMIN DAILY PO) Take  by mouth.     • niacin (NIASPAN) 500 MG CR tablet Take 1 tablet by mouth Daily. 90 tablet 3   • oxyCODONE-acetaminophen (PERCOCET) 5-325 MG per tablet Take 1 tablet by mouth Every 4 (Four) Hours As Needed (pain). 16 tablet 0     No facility-administered encounter medications on file as of 7/12/2017.        Reviewed use of high risk medication in the elderly: yes  Reviewed for potential of harmful drug interactions in the elderly: yes    Follow Up:  Return in about 4 months (around 11/12/2017).     An After Visit Summary and PPPS with all of these plans were given to the patient.        "

## 2017-07-12 NOTE — PROGRESS NOTES
Subjective   Claude E Johnson is a 76 y.o. male who presents for f/u regarding low back pain, hyperlipidemia, and HTN.    HPI Comments: His recent (11/2016) abdominal ultrasound showed slight increase in size of abdominal aneurysm, increased from 3.4 to 3.7cm. He has f/u scan scheduled 11/2017.  His HgBA1c increased with May labs but he was experiencing difficulty with injecting his insulin, states the needle was small and it was bending before med could be administered. He has since changed insulin needles and glucose is steadily improving.    Hypertension   This is a chronic problem. The current episode started more than 1 year ago. The problem is unchanged. The problem is controlled. Pertinent negatives include no anxiety, chest pain, headaches, palpitations or peripheral edema. There are no associated agents to hypertension. Past treatments include beta blockers. The current treatment provides significant improvement. There are no compliance problems.    Hyperlipidemia   This is a chronic problem. The current episode started more than 1 year ago. The problem is controlled. Associated symptoms include myalgias. Pertinent negatives include no chest pain. Current antihyperlipidemic treatment includes statins (tolerating the atorvastatin). The current treatment provides significant improvement of lipids. There are no compliance problems (denies myalgias with med).  Risk factors for coronary artery disease include diabetes mellitus.   Diabetes   He presents for his follow-up diabetic visit. He has type 2 diabetes mellitus. His disease course has been improving. There are no hypoglycemic associated symptoms. Pertinent negatives for hypoglycemia include no headaches. Associated symptoms include fatigue and foot paresthesias. Pertinent negatives for diabetes include no chest pain and no weakness. Symptoms are improving.        The following portions of the patient's history were reviewed and updated as appropriate:  "allergies, current medications, past social history and problem list.    Past Medical History:   Diagnosis Date   • CKD (chronic kidney disease)    • Hyperlipidemia    • Hypertension    • Type 2 diabetes mellitus          Current Outpatient Prescriptions:   •  aspirin 325 MG tablet, Take  by mouth daily., Disp: , Rfl:   •  atenolol (TENORMIN) 25 MG tablet, Take 1 tablet by mouth Daily., Disp: 90 tablet, Rfl: 1  •  atorvastatin (LIPITOR) 40 MG tablet, Take 1 tablet by mouth Daily., Disp: 90 tablet, Rfl: 3  •  baclofen (LIORESAL) 10 MG tablet, Take 1 tablet by mouth 3 (Three) Times a Day., Disp: 15 tablet, Rfl: 0  •  fluticasone (FLONASE) 50 MCG/ACT nasal spray, 2 sprays into each nostril Daily., Disp: 1 each, Rfl: 3  •  glucose blood test strip, OneTouch Ultra Blue In Vitro Strip; Patient Sig: OneTouch Ultra Blue In Vitro Strip USE 1 KIT; 0; 06-Mar-2014; Active, Disp: , Rfl:   •  insulin aspart (NOVOLOG FLEXPEN) 100 UNIT/ML solution pen-injector sc pen, Inject 6 Units under the skin 3 (Three) Times a Day With Meals., Disp: 10 pen, Rfl: 1  •  insulin glargine (LANTUS) 100 UNIT/ML injection, Inject 44 Units under the skin Every Night., Disp: 5 each, Rfl: 1  •  Insulin Pen Needle (PEN NEEDLES) 29G X 12MM misc, Inject 1 each under the skin 4 (Four) Times a Day. Use with Insulin, Disp: 400 each, Rfl: 1  •  Insulin Pen Needle (PEN NEEDLES) 31G X 6 MM misc, Use to inject insulin, Disp: 20 each, Rfl: 0  •  Insulin Pen Needle 32G X 4 MM misc, TID SC, Disp: 100 each, Rfl: 5  •  Insulin Syringe-Needle U-100 30G X 1/2\" 0.5 ML misc, 1 each 5 (Five) Times a Day., Disp: 450 each, Rfl: 2  •  Multiple Vitamin (MULTI VITAMIN DAILY PO), Take  by mouth., Disp: , Rfl:   •  niacin (NIASPAN) 500 MG CR tablet, Take 1 tablet by mouth Daily., Disp: 90 tablet, Rfl: 3  •  oxyCODONE-acetaminophen (PERCOCET) 5-325 MG per tablet, Take 1 tablet by mouth Every 4 (Four) Hours As Needed (pain)., Disp: 16 tablet, Rfl: 0    Allergies   Allergen " "Reactions   • Morphine And Related    • Nabumetone Rash       Review of Systems   Constitutional: Positive for fatigue. Negative for chills, fever and unexpected weight change.   HENT: Negative for congestion, ear pain, postnasal drip, sinus pressure, sore throat and trouble swallowing.    Eyes: Negative for visual disturbance.   Respiratory: Negative for cough, chest tightness and wheezing.    Cardiovascular: Negative for chest pain, palpitations and leg swelling.   Gastrointestinal: Negative for abdominal pain, blood in stool, nausea and vomiting.   Endocrine: Negative for cold intolerance and heat intolerance.   Genitourinary: Positive for frequency. Negative for dysuria and urgency.        +nocturia   Musculoskeletal: Positive for back pain and myalgias. Negative for arthralgias and joint swelling.   Skin: Negative for color change.   Allergic/Immunologic: Negative for immunocompromised state.   Neurological: Negative for syncope, weakness and headaches.   Hematological: Does not bruise/bleed easily.       Objective   Vitals:    07/12/17 1022   BP: 124/82   BP Location: Left arm   Patient Position: Sitting   Cuff Size: Adult   Pulse: 50   SpO2: 98%   Weight: 270 lb (122 kg)   Height: 70\" (177.8 cm)     Physical Exam   Constitutional: He is oriented to person, place, and time. He appears well-developed and well-nourished. No distress.   HENT:   Head: Normocephalic and atraumatic.   Right Ear: External ear normal.   Left Ear: External ear normal.   Eyes: Conjunctivae are normal.   Neck: Normal range of motion. Neck supple.   Cardiovascular: Normal rate, regular rhythm, normal heart sounds and intact distal pulses.  Exam reveals no gallop and no friction rub.    No murmur heard.  Pulmonary/Chest: Effort normal and breath sounds normal. No respiratory distress.   Musculoskeletal:        Lumbar back: He exhibits tenderness.   Lymphadenopathy:     He has no cervical adenopathy.   Neurological: He is alert and oriented " to person, place, and time.   Skin: Skin is warm and dry. No rash noted. No erythema.   Psychiatric: He has a normal mood and affect. His behavior is normal.   Nursing note and vitals reviewed.      Assessment/Plan   Claude was seen today for subsequent medicare wellness exam.    Diagnoses and all orders for this visit:    Hypertension, benign  Comments:  stable on current meds    Hypercholesterolemia  Comments:  tolerating Atorvastatin without myalgias, recheck lipid panel today (CMP done 5/2017)  Orders:  -     Lipid Panel; Future  -     Lipid Panel    Type 2 diabetes mellitus with diabetic neuropathy, with long-term current use of insulin  Comments:  glucose improving with change in insulin needles, has f/u appt scheduled with endo

## 2017-09-29 RX ORDER — INSULIN GLARGINE 100 [IU]/ML
44 INJECTION, SOLUTION SUBCUTANEOUS NIGHTLY
Qty: 5 EACH | Refills: 3 | Status: SHIPPED | OUTPATIENT
Start: 2017-09-29 | End: 2017-10-17

## 2017-10-17 ENCOUNTER — OFFICE VISIT (OUTPATIENT)
Dept: ENDOCRINOLOGY | Age: 77
End: 2017-10-17

## 2017-10-17 VITALS
WEIGHT: 272.2 LBS | BODY MASS INDEX: 38.97 KG/M2 | SYSTOLIC BLOOD PRESSURE: 132 MMHG | HEIGHT: 70 IN | DIASTOLIC BLOOD PRESSURE: 82 MMHG | HEART RATE: 47 BPM

## 2017-10-17 DIAGNOSIS — Z79.4 ENCOUNTER FOR LONG-TERM (CURRENT) USE OF INSULIN (HCC): ICD-10-CM

## 2017-10-17 DIAGNOSIS — E16.1 HYPERINSULINISM: ICD-10-CM

## 2017-10-17 DIAGNOSIS — R63.5 ABNORMAL WEIGHT GAIN: ICD-10-CM

## 2017-10-17 DIAGNOSIS — E11.22 CKD STAGE 3 DUE TO TYPE 2 DIABETES MELLITUS (HCC): ICD-10-CM

## 2017-10-17 DIAGNOSIS — IMO0002 UNCONTROLLED TYPE II DIABETES MELLITUS WITH NEPHROPATHY: ICD-10-CM

## 2017-10-17 DIAGNOSIS — IMO0002 UNCONTROLLED TYPE 2 DIABETES MELLITUS WITH COMPLICATION, WITH LONG-TERM CURRENT USE OF INSULIN: Primary | ICD-10-CM

## 2017-10-17 DIAGNOSIS — N18.30 CKD STAGE 3 DUE TO TYPE 2 DIABETES MELLITUS (HCC): ICD-10-CM

## 2017-10-17 PROCEDURE — 99214 OFFICE O/P EST MOD 30 MIN: CPT | Performed by: INTERNAL MEDICINE

## 2017-10-17 NOTE — PROGRESS NOTES
77 y.o.    Patient Care Team:  Lopez Salas MD as PCP - General (Internal Medicine)  Yossi Saucedo MD as PCP - Claims Attributed  Yossi Saucedo MD as Consulting Physician (Cardiology)  Jordy Rene MD as Consulting Physician (Nephrology)    Chief Complaint:      F/U TYPE 2 DIABETES, UNCONTROLLED.  NO RECENT LABS.  Subjective     HPI   Patient is a 69-year-old white male with a past history of uncontrolled type 2 diabetes mellitus came for follow-up  He's a former patient of Dr. Mendosa    Uncontrolled type 2 diabetes mellitus  Patient is currently taking tresiba 44 units at night and NovoLog 6 units before each meal.  Patient forgot to bring his glucometer today  He reports that most of his blood sugars are in the 200-300 range  Hypoglycemia  Patient has not had any hypoglycemia in the recent past.  Uncontrolled type 2 diabetes mellitus with neuropathy  Patient is symptoms in both feet of tingling and burning  Uncontrolled type 2 diabetes mellitus with nephropathy  Patient is chronic kidney disease and elevated creatinine  Abnormal weight gain  Patient actually is gaining weight.  He gained an additional 2 pounds in the past few months.  Patient's BMI is currently 39.1        Interval History     Summary  Patient was diagnosed with uncontrolled type 2 diabetes mellitus in 1997 and has been on medication ever since.      He currently takes Lantus 50 units at night. No other medication for diabetes. He used to be on Byetta and Humalog but apparently due to insurance issues he discontinued both of them. He has been focused on diet and activity and exercise and is managing to keep his blood sugars below 120. For the past few months.. 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 He has no sores or lesions on the  feet      Uncontrolled type 2 diabetes mellitus with nephropathy and chronic kidney disease.  Patient has regular follow-up with a nephrologist      Patient denied any knowledge of diabetic retinopathy.  Patient had occasional episodes of hypoglycemia with a blood sugar dropping and to 70s and he becomes symptomatic  He started walking 2 miles every other day and has been controlling his diet very strictly.      Abnormal weight gain  Patient is also gained significant weight over the past several years. He currently weighs 274 pounds and is actively planning to lose weight  The following portions of the patient's history were reviewed and updated as appropriate: allergies, current medications, past family history, past medical history, past social history, past surgical history and problem list.    Past Medical History:   Diagnosis Date   • CKD (chronic kidney disease)    • Hyperlipidemia    • Hypertension    • Type 2 diabetes mellitus      Family History   Problem Relation Age of Onset   • Cancer Mother    • Heart disease Father      Social History     Social History   • Marital status:      Spouse name: N/A   • Number of children: N/A   • Years of education: N/A     Occupational History   • Not on file.     Social History Main Topics   • Smoking status: Former Smoker   • Smokeless tobacco: Not on file   • Alcohol use No   • Drug use: No   • Sexual activity: Not on file     Other Topics Concern   • Not on file     Social History Narrative     Allergies   Allergen Reactions   • Morphine And Related    • Nabumetone Rash       Current Outpatient Prescriptions:   •  aspirin 325 MG tablet, Take  by mouth daily., Disp: , Rfl:   •  atenolol (TENORMIN) 25 MG tablet, Take 1 tablet by mouth Daily., Disp: 90 tablet, Rfl: 1  •  atorvastatin (LIPITOR) 40 MG tablet, Take 1 tablet by mouth Daily., Disp: 90 tablet, Rfl: 3  •  baclofen (LIORESAL) 10 MG tablet, Take 1 tablet by mouth 3 (Three) Times a Day., Disp: 15 tablet,  "Rfl: 0  •  fluticasone (FLONASE) 50 MCG/ACT nasal spray, 2 sprays into each nostril Daily., Disp: 1 each, Rfl: 3  •  glucose blood test strip, OneTouch Ultra Blue In Vitro Strip; Patient Sig: OneTouch Ultra Blue In Vitro Strip USE 1 KIT; 0; 06-Mar-2014; Active, Disp: , Rfl:   •  insulin aspart (NOVOLOG FLEXPEN) 100 UNIT/ML solution pen-injector sc pen, Inject 6 Units under the skin 3 (Three) Times a Day With Meals., Disp: 10 pen, Rfl: 1  •  insulin glargine (LANTUS) 100 UNIT/ML injection, Inject 44 Units under the skin Every Night., Disp: 5 each, Rfl: 3  •  Insulin Pen Needle (PEN NEEDLES) 29G X 12MM misc, Inject 1 each under the skin 4 (Four) Times a Day. Use with Insulin, Disp: 400 each, Rfl: 1  •  Insulin Pen Needle (PEN NEEDLES) 31G X 6 MM misc, Use to inject insulin, Disp: 20 each, Rfl: 0  •  Insulin Pen Needle 32G X 4 MM misc, TID SC, Disp: 100 each, Rfl: 5  •  Insulin Syringe-Needle U-100 30G X 1/2\" 0.5 ML misc, 1 each 5 (Five) Times a Day., Disp: 450 each, Rfl: 2  •  Multiple Vitamin (MULTI VITAMIN DAILY PO), Take  by mouth., Disp: , Rfl:   •  niacin (NIASPAN) 500 MG CR tablet, Take 1 tablet by mouth Daily., Disp: 90 tablet, Rfl: 3  •  oxyCODONE-acetaminophen (PERCOCET) 5-325 MG per tablet, Take 1 tablet by mouth Every 4 (Four) Hours As Needed (pain)., Disp: 16 tablet, Rfl: 0        Review of Systems   Constitutional: Negative for chills, fatigue and fever.   Cardiovascular: Negative for chest pain and palpitations.   Gastrointestinal: Negative for abdominal pain, constipation, diarrhea, nausea and vomiting.   Endocrine: Negative for cold intolerance and heat intolerance.   All other systems reviewed and are negative.      Objective       Vitals:    10/17/17 1207   BP: 132/82   Pulse: (!) 47   Weight: 272 lb 3.2 oz (123 kg)   Height: 70\" (177.8 cm)     Body mass index is 39.06 kg/(m^2).      Physical Exam   Constitutional: He is oriented to person, place, and time. He appears well-developed and " well-nourished.   HENT:   Head: Normocephalic and atraumatic.   Eyes: EOM are normal. Pupils are equal, round, and reactive to light.   Neck: Normal range of motion. Neck supple. No thyromegaly present.   Cardiovascular: Normal rate, regular rhythm, normal heart sounds and intact distal pulses.    Pulmonary/Chest: Effort normal and breath sounds normal.   Abdominal: Soft. Bowel sounds are normal. He exhibits distension. There is no tenderness.   Lymphadenopathy:     He has no cervical adenopathy.   Neurological: He is alert and oriented to person, place, and time. He has normal reflexes.   Skin: Skin is warm and dry.   Psychiatric: He has a normal mood and affect. His behavior is normal.   Nursing note and vitals reviewed.    Results Review:     I reviewed the patient's new clinical results.    Medical records reviewed  Summary:      Office Visit on 07/12/2017   Component Date Value Ref Range Status   • Total Cholesterol 07/12/2017 137  0 - 200 mg/dL Final   • Triglycerides 07/12/2017 139  0 - 150 mg/dL Final   • HDL Cholesterol 07/12/2017 41  40 - 60 mg/dL Final   • VLDL Cholesterol 07/12/2017 27.8  5 - 40 mg/dL Final   • LDL Cholesterol  07/12/2017 68  0 - 100 mg/dL Final     Lab Results   Component Value Date    HGBA1C 7.83 (H) 05/09/2017    HGBA1C 7.00 (H) 01/06/2017    HGBA1C 8.6 (H) 09/02/2016     Lab Results   Component Value Date    MICROALBUR 169.0 05/09/2017    CREATININE 1.25 05/09/2017     Imaging Results (most recent)     None                Assessment and Plan:    Claude was seen today for diabetes.    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        Patient forgot to bring his glucometer today  He reports that most of his blood sugars are elevated in the 200-300 range for the past few months  Patient reports that he is checking his blood sugar 2 hours after meal and not before    Advised the patient to check his blood sugars before meal  He is currently taking NovoLog 6 units before each meal he will continue that  His insurance company wants to switch Lantus to tresiba  Prescription was given for 44 units of tresiba to be taken at bedtime    Considering his age and comorbid conditions his hemoglobin A1c goal is between 7.5-8%  I explained this to the patient he verbalized understanding    Patient will get lab testing done today  He will return to follow-up in 3-4 months.      The total time spent for old record and lab review and face- to- face was more than 25 min of which greater than 15 min of time was spent on counseling the patient on recommended evaluation and treatment options, instructions for management/treatment and /or follow up  and importance of compliance with chosen management or treatment options  Layton Jolly MD. FACE    10/17/17      [facility] [place] / transcription disclaimer:     \"Dictated utilizing Dragon dictation\".         "

## 2017-10-18 LAB
ALBUMIN SERPL-MCNC: 4.3 G/DL (ref 3.5–5.2)
ALBUMIN/CREAT UR: 64.4 MG/G CREAT (ref 0–30)
ALBUMIN/GLOB SERPL: 1.8 G/DL
ALP SERPL-CCNC: 86 U/L (ref 39–117)
ALT SERPL-CCNC: 18 U/L (ref 1–41)
AST SERPL-CCNC: 19 U/L (ref 1–40)
BILIRUB SERPL-MCNC: 0.9 MG/DL (ref 0.1–1.2)
BUN SERPL-MCNC: 28 MG/DL (ref 8–23)
BUN/CREAT SERPL: 21.9 (ref 7–25)
CALCIUM SERPL-MCNC: 9.9 MG/DL (ref 8.6–10.5)
CHLORIDE SERPL-SCNC: 105 MMOL/L (ref 98–107)
CO2 SERPL-SCNC: 25.2 MMOL/L (ref 22–29)
CREAT SERPL-MCNC: 1.28 MG/DL (ref 0.76–1.27)
CREAT UR-MCNC: 107.7 MG/DL
GLOBULIN SER CALC-MCNC: 2.4 GM/DL
GLUCOSE SERPL-MCNC: 131 MG/DL (ref 65–99)
HBA1C MFR BLD: 8.7 % (ref 4.8–5.6)
MICROALBUMIN UR-MCNC: 69.4 UG/ML
POTASSIUM SERPL-SCNC: 5.1 MMOL/L (ref 3.5–5.2)
PROT SERPL-MCNC: 6.7 G/DL (ref 6–8.5)
SODIUM SERPL-SCNC: 142 MMOL/L (ref 136–145)
TSH SERPL DL<=0.005 MIU/L-ACNC: 1.49 MIU/ML (ref 0.27–4.2)

## 2017-10-23 RX ORDER — ELECTROLYTES/DEXTROSE
1 SOLUTION, ORAL ORAL 4 TIMES DAILY
Qty: 100 EACH | Refills: 1 | Status: SHIPPED | OUTPATIENT
Start: 2017-10-23 | End: 2018-06-11 | Stop reason: SDUPTHER

## 2017-10-31 ENCOUNTER — TELEPHONE (OUTPATIENT)
Dept: INTERNAL MEDICINE | Facility: CLINIC | Age: 77
End: 2017-10-31

## 2017-10-31 DIAGNOSIS — I10 HYPERTENSION, BENIGN: ICD-10-CM

## 2017-10-31 RX ORDER — ATENOLOL 25 MG/1
25 TABLET ORAL DAILY
Qty: 90 TABLET | Refills: 3 | Status: SHIPPED | OUTPATIENT
Start: 2017-10-31 | End: 2018-03-21 | Stop reason: SDUPTHER

## 2017-10-31 NOTE — TELEPHONE ENCOUNTER
----- Message from Helen Lee MA sent at 10/31/2017  1:45 PM EDT -----  Pt calling for refill    Atenolol #[phone] with refills    [facility]

## 2017-11-15 ENCOUNTER — OFFICE VISIT (OUTPATIENT)
Dept: INTERNAL MEDICINE | Facility: CLINIC | Age: 77
End: 2017-11-15

## 2017-11-15 VITALS
HEIGHT: 70 IN | DIASTOLIC BLOOD PRESSURE: 80 MMHG | BODY MASS INDEX: 39.08 KG/M2 | OXYGEN SATURATION: 98 % | SYSTOLIC BLOOD PRESSURE: 128 MMHG | HEART RATE: 54 BPM | WEIGHT: 273 LBS

## 2017-11-15 DIAGNOSIS — Z79.4 TYPE 2 DIABETES MELLITUS WITH DIABETIC NEUROPATHY, WITH LONG-TERM CURRENT USE OF INSULIN (HCC): ICD-10-CM

## 2017-11-15 DIAGNOSIS — I10 HYPERTENSION, BENIGN: Primary | ICD-10-CM

## 2017-11-15 DIAGNOSIS — E11.40 TYPE 2 DIABETES MELLITUS WITH DIABETIC NEUROPATHY, WITH LONG-TERM CURRENT USE OF INSULIN (HCC): ICD-10-CM

## 2017-11-15 DIAGNOSIS — E78.00 HYPERCHOLESTEROLEMIA: ICD-10-CM

## 2017-11-15 DIAGNOSIS — N40.1 BENIGN PROSTATIC HYPERPLASIA WITH URINARY FREQUENCY: ICD-10-CM

## 2017-11-15 DIAGNOSIS — R35.0 BENIGN PROSTATIC HYPERPLASIA WITH URINARY FREQUENCY: ICD-10-CM

## 2017-11-15 PROCEDURE — 99214 OFFICE O/P EST MOD 30 MIN: CPT | Performed by: NURSE PRACTITIONER

## 2017-11-16 RX ORDER — TAMSULOSIN HYDROCHLORIDE 0.4 MG/1
1 CAPSULE ORAL NIGHTLY
Qty: 90 CAPSULE | Refills: 1 | Status: SHIPPED | OUTPATIENT
Start: 2017-11-16 | End: 2018-02-09 | Stop reason: SDUPTHER

## 2017-11-16 NOTE — PROGRESS NOTES
Subjective   Claude E Johnson is a 77 y.o. male who presents for f/u regarding HTN, hyperlipidemia and urinary frequency.    HPI Comments: His recent HgBA1c (10/17) was 8.7 but he states his insulin needle was not large enough to penetrate his skin and the insulin was not being absorbed. He has since changed needles and has repeat labs scheduled for January.  His biggest complaint today is urinary frequency with nocturia, denies dysuria.    Hypertension   This is a chronic problem. The current episode started more than 1 year ago. The problem is unchanged. The problem is controlled. Pertinent negatives include no anxiety, chest pain, headaches, palpitations or peripheral edema. There are no associated agents to hypertension. Past treatments include beta blockers. The current treatment provides significant improvement. There are no compliance problems.    Hyperlipidemia   This is a chronic problem. The current episode started more than 1 year ago. The problem is controlled. Pertinent negatives include no chest pain or myalgias. Current antihyperlipidemic treatment includes statins (tolerating the atorvastatin). The current treatment provides significant improvement of lipids. There are no compliance problems (denies myalgias with med).  Risk factors for coronary artery disease include diabetes mellitus.   Diabetes   He presents for his follow-up diabetic visit. He has type 2 diabetes mellitus. His disease course has been improving. There are no hypoglycemic associated symptoms. Pertinent negatives for hypoglycemia include no headaches. Associated symptoms include fatigue and foot paresthesias. Pertinent negatives for diabetes include no chest pain and no weakness. Symptoms are improving.        The following portions of the patient's history were reviewed and updated as appropriate: allergies, current medications, past social history and problem list.    Past Medical History:   Diagnosis Date   • CKD (chronic kidney  "disease)    • Hyperlipidemia    • Hypertension    • Type 2 diabetes mellitus          Current Outpatient Prescriptions:   •  aspirin 325 MG tablet, Take  by mouth daily., Disp: , Rfl:   •  atenolol (TENORMIN) 25 MG tablet, Take 1 tablet by mouth Daily., Disp: 90 tablet, Rfl: 3  •  atorvastatin (LIPITOR) 40 MG tablet, Take 1 tablet by mouth Daily., Disp: 90 tablet, Rfl: 3  •  baclofen (LIORESAL) 10 MG tablet, Take 1 tablet by mouth 3 (Three) Times a Day., Disp: 15 tablet, Rfl: 0  •  fluticasone (FLONASE) 50 MCG/ACT nasal spray, 2 sprays into each nostril Daily., Disp: 1 each, Rfl: 3  •  glucose blood test strip, 1 each by Other route 4 (Four) Times a Day. OneTouch Ultra Blue In Vitro Strip DX: CODE E11.8, Disp: 400 each, Rfl: 3  •  insulin aspart (NOVOLOG FLEXPEN) 100 UNIT/ML solution pen-injector sc pen, Inject 6 Units under the skin 3 (Three) Times a Day With Meals., Disp: 10 pen, Rfl: 1  •  Insulin Degludec (TRESIBA FLEXTOUCH) 200 UNIT/ML solution pen-injector, Inject 44 Units under the skin Daily., Disp: 45 mL, Rfl: 2  •  Insulin Pen Needle (PEN NEEDLES) 29G X 12MM misc, Inject 1 each under the skin 4 (Four) Times a Day. Use with Insulin, Disp: 100 each, Rfl: 1  •  Insulin Pen Needle 32G X 4 MM misc, TID SC, Disp: 100 each, Rfl: 5  •  Insulin Syringe-Needle U-100 30G X 1/2\" 0.5 ML misc, 1 each 5 (Five) Times a Day., Disp: 450 each, Rfl: 2  •  Multiple Vitamin (MULTI VITAMIN DAILY PO), Take  by mouth., Disp: , Rfl:   •  tamsulosin (FLOMAX) 0.4 MG capsule 24 hr capsule, Take 1 capsule by mouth Every Night., Disp: 90 capsule, Rfl: 1    Allergies   Allergen Reactions   • Morphine And Related    • Nabumetone Rash       Review of Systems   Constitutional: Positive for fatigue. Negative for chills, fever and unexpected weight change.   HENT: Negative for congestion, ear pain, postnasal drip, sinus pressure, sore throat and trouble swallowing.    Eyes: Negative for visual disturbance.   Respiratory: Negative for cough, " "chest tightness and wheezing.    Cardiovascular: Negative for chest pain, palpitations and leg swelling.   Gastrointestinal: Negative for abdominal pain, blood in stool, nausea and vomiting.   Endocrine: Negative for cold intolerance and heat intolerance.   Genitourinary: Positive for frequency. Negative for dysuria and urgency.   Musculoskeletal: Negative for arthralgias, joint swelling and myalgias.   Skin: Negative for color change.   Allergic/Immunologic: Negative for immunocompromised state.   Neurological: Negative for syncope, weakness and headaches.   Hematological: Does not bruise/bleed easily.       Objective   Vitals:    11/15/17 0943   BP: 128/80   BP Location: Left arm   Patient Position: Sitting   Cuff Size: Adult   Pulse: 54   SpO2: 98%   Weight: 273 lb (124 kg)   Height: 70\" (177.8 cm)     Physical Exam   Constitutional: He is oriented to person, place, and time. He appears well-developed and well-nourished. No distress.   HENT:   Head: Normocephalic and atraumatic.   Right Ear: External ear normal.   Left Ear: External ear normal.   Eyes: Conjunctivae are normal.   Neck: Normal range of motion. Neck supple.   Cardiovascular: Normal rate, regular rhythm, normal heart sounds and intact distal pulses.  Exam reveals no gallop and no friction rub.    No murmur heard.  Pulmonary/Chest: Effort normal and breath sounds normal. No respiratory distress.   Lymphadenopathy:     He has no cervical adenopathy.   Neurological: He is alert and oriented to person, place, and time.   Skin: Skin is warm and dry. No rash noted. No erythema.   Psychiatric: He has a normal mood and affect. His behavior is normal.   Nursing note and vitals reviewed.      Assessment/Plan   Claude was seen today for hypertension.    Diagnoses and all orders for this visit:    Hypertension, benign  Comments:  stable on current meds    Hypercholesterolemia  Comments:  LDL 68 on 7/12/2017    Type 2 diabetes mellitus with diabetic neuropathy, " with long-term current use of insulin  Comments:  recent A1c was elevated, repeat labs in January    Benign prostatic hyperplasia with urinary frequency  Comments:  discussed side effects of Flomax, continue to monitor  Orders:  -     tamsulosin (FLOMAX) 0.4 MG capsule 24 hr capsule; Take 1 capsule by mouth Every Night.

## 2017-12-26 ENCOUNTER — TELEPHONE (OUTPATIENT)
Dept: INTERNAL MEDICINE | Facility: CLINIC | Age: 77
End: 2017-12-26

## 2018-01-02 RX ORDER — FLUTICASONE PROPIONATE 50 MCG
SPRAY, SUSPENSION (ML) NASAL
Qty: 16 G | Refills: 3 | Status: SHIPPED | OUTPATIENT
Start: 2018-01-02 | End: 2018-06-04 | Stop reason: SDUPTHER

## 2018-01-22 ENCOUNTER — PROCEDURE VISIT CONVERTED (OUTPATIENT)
Dept: PODIATRY | Facility: CLINIC | Age: 78
End: 2018-01-22
Attending: PODIATRIST

## 2018-01-22 ENCOUNTER — CONVERSION ENCOUNTER (OUTPATIENT)
Dept: PODIATRY | Facility: CLINIC | Age: 78
End: 2018-01-22

## 2018-01-30 ENCOUNTER — OFFICE VISIT CONVERTED (OUTPATIENT)
Dept: PODIATRY | Facility: CLINIC | Age: 78
End: 2018-01-30
Attending: PODIATRIST

## 2018-02-09 ENCOUNTER — TELEPHONE (OUTPATIENT)
Dept: INTERNAL MEDICINE | Facility: CLINIC | Age: 78
End: 2018-02-09

## 2018-02-09 DIAGNOSIS — N40.1 BENIGN PROSTATIC HYPERPLASIA WITH URINARY FREQUENCY: ICD-10-CM

## 2018-02-09 DIAGNOSIS — R35.0 BENIGN PROSTATIC HYPERPLASIA WITH URINARY FREQUENCY: ICD-10-CM

## 2018-02-09 RX ORDER — TAMSULOSIN HYDROCHLORIDE 0.4 MG/1
1 CAPSULE ORAL NIGHTLY
Qty: 90 CAPSULE | Refills: 1 | Status: SHIPPED | OUTPATIENT
Start: 2018-02-09 | End: 2018-08-03 | Stop reason: SDUPTHER

## 2018-02-09 RX ORDER — ATORVASTATIN CALCIUM 40 MG/1
40 TABLET, FILM COATED ORAL DAILY
Qty: 90 TABLET | Refills: 3 | Status: SHIPPED | OUTPATIENT
Start: 2018-02-09 | End: 2019-03-21 | Stop reason: SDUPTHER

## 2018-02-09 NOTE — TELEPHONE ENCOUNTER
----- Message from Mary Obregon sent at 2/9/2018  1:02 PM EST -----  Contact: pt - Dr Salas' pt - RE: Rx refill  Pt calling and would like a refill on Rx      atorvastatin (LIPITOR) 40 MG tablet 90 tablet       Sig - Route: Take 1 tablet by mouth Daily. - Oral    tamsulosin (FLOMAX) 0.4 MG capsule 24 hr capsule 90 capsule       Sig - Route: Take 1 capsule by mouth Every Night. - Oral    [facility] - [place], [place] - [address] AT Portal to Registered [facility] Sites - [phone]  - [phone] FX    Pt # [phone]

## 2018-03-12 ENCOUNTER — OFFICE VISIT (OUTPATIENT)
Dept: ENDOCRINOLOGY | Age: 78
End: 2018-03-12

## 2018-03-12 VITALS
DIASTOLIC BLOOD PRESSURE: 78 MMHG | BODY MASS INDEX: 38.63 KG/M2 | HEIGHT: 70 IN | SYSTOLIC BLOOD PRESSURE: 130 MMHG | WEIGHT: 269.8 LBS | HEART RATE: 71 BPM

## 2018-03-12 DIAGNOSIS — IMO0002 UNCONTROLLED TYPE 2 DIABETES MELLITUS WITH COMPLICATION, WITH LONG-TERM CURRENT USE OF INSULIN: Primary | ICD-10-CM

## 2018-03-12 DIAGNOSIS — E16.1 HYPERINSULINISM: ICD-10-CM

## 2018-03-12 DIAGNOSIS — R63.5 ABNORMAL WEIGHT GAIN: ICD-10-CM

## 2018-03-12 DIAGNOSIS — IMO0002 UNCONTROLLED TYPE II DIABETES MELLITUS WITH NEPHROPATHY: ICD-10-CM

## 2018-03-12 DIAGNOSIS — Z79.4 ENCOUNTER FOR LONG-TERM (CURRENT) USE OF INSULIN (HCC): ICD-10-CM

## 2018-03-12 DIAGNOSIS — N18.30 STAGE 3 CHRONIC KIDNEY DISEASE (HCC): ICD-10-CM

## 2018-03-12 PROCEDURE — 99214 OFFICE O/P EST MOD 30 MIN: CPT | Performed by: INTERNAL MEDICINE

## 2018-03-12 NOTE — PROGRESS NOTES
77 y.o.    Patient Care Team:  Lopez Salas MD as PCP - General (Internal Medicine)  Layton FISHMAN MD as PCP - Claims Attributed  Yossi Saucedo MD as Consulting Physician (Cardiology)  Jordy Rene MD as Consulting Physician (Nephrology)    Chief Complaint:      F/U TYPE 2 DIABETES, UNCONTROLLED.  NO RECENT LABS.    Subjective     HPI   Patient is a 77-year-old white male with a past history of uncontrolled type 2 diabetes mellitus came for follow-up  He's a former patient of Dr. Mendosa    Uncontrolled type 2 diabetes mellitus  Patient is currently taking tresiba 44 units at bedtime and NovoLog 6 units before each meal  His glucose log reveals that most of the blood sugars are in the 120-200 range  Hypoglycemia  Patient has not had any hypoglycemia in the past 6 months  Uncontrolled type 2 diabetes mellitus with neuropathy  Patient is symptomatic of tingling and burning in both feet  Uncontrolled type 2 diabetes mellitus with nephropathy  Patient has chronic kidney disease with elevated creatinine  He has nephrology follow-up  Patient denied any knowledge of diabetic retinopathy  Abnormal weight gain  Patient currently weighs 269 pounds with a BMI of 38.7        Interval History     Summary  Patient was diagnosed with uncontrolled type 2 diabetes mellitus in 1997 and has been on medication ever since.      He currently takes Lantus 50 units at night. No other medication for diabetes. He used to be on Byetta and Humalog but apparently due to insurance issues he discontinued both of them. He has been focused on diet and activity and exercise and is managing to keep his blood sugars below 120. For the past few months.. 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 He has no sores or lesions on the  feet      Uncontrolled type 2 diabetes mellitus with nephropathy and chronic kidney disease.  Patient has regular follow-up with a nephrologist      Patient denied any knowledge of diabetic retinopathy.  Patient had occasional episodes of hypoglycemia with a blood sugar dropping and to 70s and he becomes symptomatic  He started walking 2 miles every other day and has been controlling his diet very strictly.      Abnormal weight gain  Patient is also gained significant weight over the past several years. He currently weighs 274 pounds and is actively planning to lose weight  The following portions of the patient's history were reviewed and updated as appropriate: allergies, current medications, past family history, past medical history, past social history, past surgical history and problem list.    Past Medical History:   Diagnosis Date   • CKD (chronic kidney disease)    • Hyperlipidemia    • Hypertension    • Type 2 diabetes mellitus      Family History   Problem Relation Age of Onset   • Cancer Mother    • Heart disease Father      Social History     Social History   • Marital status:      Spouse name: N/A   • Number of children: N/A   • Years of education: N/A     Occupational History   • Not on file.     Social History Main Topics   • Smoking status: Former Smoker   • Smokeless tobacco: Never Used   • Alcohol use No   • Drug use: No   • Sexual activity: Not on file     Other Topics Concern   • Not on file     Social History Narrative   • No narrative on file     Allergies   Allergen Reactions   • Morphine And Related    • Nabumetone Rash       Current Outpatient Prescriptions:   •  aspirin 325 MG tablet, Take  by mouth daily., Disp: , Rfl:   •  atenolol (TENORMIN) 25 MG tablet, Take 1 tablet by mouth Daily., Disp: 90 tablet, Rfl: 3  •  atorvastatin (LIPITOR) 40 MG tablet, Take 1 tablet by mouth Daily., Disp: 90 tablet, Rfl: 3  •  baclofen (LIORESAL) 10 MG tablet, Take 1 tablet by mouth 3 (Three) Times a  "Day., Disp: 15 tablet, Rfl: 0  •  fluticasone (FLONASE) 50 MCG/ACT nasal spray, USE 2 SPRAYS IN EACH       NOSTRIL DAILY, Disp: 16 g, Rfl: 3  •  glucose blood test strip, 1 each by Other route 4 (Four) Times a Day. OneTouch Ultra Blue In Vitro Strip DX: CODE E11.8, Disp: 400 each, Rfl: 3  •  insulin aspart (NOVOLOG FLEXPEN) 100 UNIT/ML solution pen-injector sc pen, Inject 6 Units under the skin 3 (Three) Times a Day With Meals., Disp: 10 pen, Rfl: 1  •  Insulin Degludec (TRESIBA FLEXTOUCH) 200 UNIT/ML solution pen-injector, Inject 44 Units under the skin Daily., Disp: 45 mL, Rfl: 2  •  Insulin Pen Needle (PEN NEEDLES) 29G X 12MM misc, Inject 1 each under the skin 4 (Four) Times a Day. Use with Insulin, Disp: 100 each, Rfl: 1  •  Insulin Pen Needle 32G X 4 MM misc, TID SC, Disp: 100 each, Rfl: 5  •  Insulin Syringe-Needle U-100 30G X 1/2\" 0.5 ML misc, 1 each 5 (Five) Times a Day., Disp: 450 each, Rfl: 2  •  Multiple Vitamin (MULTI VITAMIN DAILY PO), Take  by mouth., Disp: , Rfl:   •  tamsulosin (FLOMAX) 0.4 MG capsule 24 hr capsule, Take 1 capsule by mouth Every Night., Disp: 90 capsule, Rfl: 1        Review of Systems   Constitutional: Negative for chills, fatigue and fever.   Cardiovascular: Negative for chest pain and palpitations.   Gastrointestinal: Negative for abdominal pain, constipation, diarrhea, nausea and vomiting.   Endocrine: Negative for cold intolerance and heat intolerance.   All other systems reviewed and are negative.      Objective       Vitals:    03/12/18 1107   BP: 130/78   Pulse: 71   Weight: 122 kg (269 lb 12.8 oz)   Height: 177.8 cm (70\")     Body mass index is 38.71 kg/m².      Physical Exam   Constitutional: He is oriented to person, place, and time. He appears well-developed and well-nourished.   HENT:   Head: Normocephalic and atraumatic.   Eyes: EOM are normal. Pupils are equal, round, and reactive to light.   Neck: Normal range of motion. Neck supple. No thyromegaly present. "   Cardiovascular: Normal rate, regular rhythm, normal heart sounds and intact distal pulses.    Pulmonary/Chest: Effort normal and breath sounds normal.   Abdominal: Soft. Bowel sounds are normal. He exhibits distension. There is no tenderness.   Lymphadenopathy:     He has no cervical adenopathy.   Neurological: He is alert and oriented to person, place, and time. He has normal reflexes.   Skin: Skin is warm and dry.   Psychiatric: He has a normal mood and affect. His behavior is normal.   Nursing note and vitals reviewed.    Results Review:     I reviewed the patient's new clinical results.    Medical records reviewed  Summary:      Office Visit on 10/17/2017   Component Date Value Ref Range Status   • Glucose 10/18/2017 131* 65 - 99 mg/dL Final   • BUN 10/18/2017 28* 8 - 23 mg/dL Final   • Creatinine 10/18/2017 1.28* 0.76 - 1.27 mg/dL Final   • eGFR Non  Am 10/18/2017 54* >60 mL/min/1.73 Final    Comment: The MDRD GFR formula is only valid for adults with stable  renal function between ages 18 and 70.     • eGFR  Am 10/18/2017 66  >60 mL/min/1.73 Final   • BUN/Creatinine Ratio 10/18/2017 21.9  7.0 - 25.0 Final   • Sodium 10/18/2017 142  136 - 145 mmol/L Final   • Potassium 10/18/2017 5.1  3.5 - 5.2 mmol/L Final   • Chloride 10/18/2017 105  98 - 107 mmol/L Final   • Total CO2 10/18/2017 25.2  22.0 - 29.0 mmol/L Final   • Calcium 10/18/2017 9.9  8.6 - 10.5 mg/dL Final   • Total Protein 10/18/2017 6.7  6.0 - 8.5 g/dL Final   • Albumin 10/18/2017 4.30  3.50 - 5.20 g/dL Final   • Globulin 10/18/2017 2.4  gm/dL Final   • A/G Ratio 10/18/2017 1.8  g/dL Final   • Total Bilirubin 10/18/2017 0.9  0.1 - 1.2 mg/dL Final   • Alkaline Phosphatase 10/18/2017 86  39 - 117 U/L Final   • AST (SGOT) 10/18/2017 19  1 - 40 U/L Final   • ALT (SGPT) 10/18/2017 18  1 - 41 U/L Final   • Hemoglobin A1C 10/18/2017 8.70* 4.80 - 5.60 % Final    Comment: Hemoglobin A1C Ranges:  Increased Risk for Diabetes  5.7% to 6.4%  Diabetes                      >= 6.5%  Diabetic Goal                < 7.0%     • TSH 10/18/2017 1.490  0.270 - 4.200 mIU/mL Final   • Creatinine, Urine 10/18/2017 107.7  Not Estab. mg/dL Final   • Microalbumin, Urine 10/18/2017 69.4  Not Estab. ug/mL Final   • Microalbumin/Creatinine Ratio 10/18/2017 64.4* 0.0 - 30.0 mg/g creat Final     Lab Results   Component Value Date    HGBA1C 8.70 (H) 10/17/2017    HGBA1C 7.83 (H) 05/09/2017    HGBA1C 7.00 (H) 01/06/2017     Lab Results   Component Value Date    MICROALBUR 69.4 10/17/2017    CREATININE 1.28 (H) 10/17/2017     Imaging Results (most recent)     None                Assessment and Plan:    Claude was seen today for diabetes.    Diagnoses and all orders for this visit:    Uncontrolled type 2 diabetes mellitus with complication, with long-term current use of insulin  -     Comprehensive Metabolic Panel  -     Hemoglobin A1c  -     TSH  -     Microalbumin / Creatinine Urine Ratio - Urine, Clean Catch    Uncontrolled type II diabetes mellitus with nephropathy    Hyperinsulinism    Encounter for long-term (current) use of insulin    Stage 3 chronic kidney disease    Abnormal weight gain        Patient's glucometer download reviewed next and most of the blood sugars are ranging from 122 200  Excellent improvement  Patient denies any hypoglycemia in the past 3-4 months    He is currently taking tresiba 44 units at bedtime  He is taking NovoLog 6 units before each meal  He managed to lose 4 pounds since last visit  He feels much better    Patient will get lab testing done today  He will continue the current dose of insulin regimen  He will return to follow-up in 4 months     The total time spent for old record and lab review and face- to- face was more than 25 min of which greater than 15 min of time was spent on counseling the patient on recommended evaluation and treatment options, instructions for management/treatment and /or follow up  and importance of compliance with chosen  "management or treatment options  Layton Jolly MD. FACE    03/12/18      EMR Dragon / transcription disclaimer:     \"Dictated utilizing Dragon dictation\".         "

## 2018-03-13 LAB
ALBUMIN SERPL-MCNC: 4.1 G/DL (ref 3.5–5.2)
ALBUMIN/CREAT UR: 211 MG/G CREAT (ref 0–30)
ALBUMIN/GLOB SERPL: 1.6 G/DL
ALP SERPL-CCNC: 97 U/L (ref 39–117)
ALT SERPL-CCNC: 19 U/L (ref 1–41)
AST SERPL-CCNC: 14 U/L (ref 1–40)
BILIRUB SERPL-MCNC: 1 MG/DL (ref 0.1–1.2)
BUN SERPL-MCNC: 25 MG/DL (ref 8–23)
BUN/CREAT SERPL: 20.5 (ref 7–25)
CALCIUM SERPL-MCNC: 9.6 MG/DL (ref 8.6–10.5)
CHLORIDE SERPL-SCNC: 102 MMOL/L (ref 98–107)
CO2 SERPL-SCNC: 24.5 MMOL/L (ref 22–29)
CREAT SERPL-MCNC: 1.22 MG/DL (ref 0.76–1.27)
CREAT UR-MCNC: 66.6 MG/DL
GFR SERPLBLD CREATININE-BSD FMLA CKD-EPI: 57 ML/MIN/1.73
GFR SERPLBLD CREATININE-BSD FMLA CKD-EPI: 70 ML/MIN/1.73
GLOBULIN SER CALC-MCNC: 2.5 GM/DL
GLUCOSE SERPL-MCNC: 110 MG/DL (ref 65–99)
HBA1C MFR BLD: 7.9 % (ref 4.8–5.6)
MICROALBUMIN UR-MCNC: 140.5 UG/ML
POTASSIUM SERPL-SCNC: 4.8 MMOL/L (ref 3.5–5.2)
PROT SERPL-MCNC: 6.6 G/DL (ref 6–8.5)
SODIUM SERPL-SCNC: 142 MMOL/L (ref 136–145)
TSH SERPL DL<=0.005 MIU/L-ACNC: 3.69 MIU/ML (ref 0.27–4.2)

## 2018-03-20 ENCOUNTER — OFFICE VISIT (OUTPATIENT)
Dept: INTERNAL MEDICINE | Facility: CLINIC | Age: 78
End: 2018-03-20

## 2018-03-20 VITALS
HEIGHT: 70 IN | WEIGHT: 269 LBS | SYSTOLIC BLOOD PRESSURE: 124 MMHG | DIASTOLIC BLOOD PRESSURE: 70 MMHG | BODY MASS INDEX: 38.51 KG/M2

## 2018-03-20 DIAGNOSIS — I25.10 CHRONIC CORONARY ARTERY DISEASE: ICD-10-CM

## 2018-03-20 DIAGNOSIS — R35.0 BENIGN PROSTATIC HYPERPLASIA WITH URINARY FREQUENCY: ICD-10-CM

## 2018-03-20 DIAGNOSIS — N40.1 BENIGN PROSTATIC HYPERPLASIA WITH URINARY FREQUENCY: ICD-10-CM

## 2018-03-20 DIAGNOSIS — Z96.60 HISTORY OF ARTIFICIAL JOINT: ICD-10-CM

## 2018-03-20 DIAGNOSIS — E11.22 CKD STAGE 3 DUE TO TYPE 2 DIABETES MELLITUS (HCC): ICD-10-CM

## 2018-03-20 DIAGNOSIS — E78.00 HYPERCHOLESTEROLEMIA: ICD-10-CM

## 2018-03-20 DIAGNOSIS — I10 HYPERTENSION, BENIGN: Primary | ICD-10-CM

## 2018-03-20 DIAGNOSIS — Z79.4 TYPE 2 DIABETES MELLITUS WITH DIABETIC NEUROPATHY, WITH LONG-TERM CURRENT USE OF INSULIN (HCC): ICD-10-CM

## 2018-03-20 DIAGNOSIS — E11.40 TYPE 2 DIABETES MELLITUS WITH DIABETIC NEUROPATHY, WITH LONG-TERM CURRENT USE OF INSULIN (HCC): ICD-10-CM

## 2018-03-20 DIAGNOSIS — N18.30 CKD STAGE 3 DUE TO TYPE 2 DIABETES MELLITUS (HCC): ICD-10-CM

## 2018-03-20 DIAGNOSIS — I71.40 ABDOMINAL AORTIC ANEURYSM (AAA) WITHOUT RUPTURE (HCC): ICD-10-CM

## 2018-03-20 PROCEDURE — 99214 OFFICE O/P EST MOD 30 MIN: CPT | Performed by: NURSE PRACTITIONER

## 2018-03-20 NOTE — PROGRESS NOTES
Subjective   Claude E Johnson is a 77 y.o. male who presents for f/u regarding HTN, chronic kidney disease, and DM2.    His recent A1c showed improvement, states he has watched his diet closer and has lost a few pounds. He reports feeling well, has moved recently and is settling into his new house.    Hypertension   This is a chronic problem. The current episode started more than 1 year ago. The problem is unchanged. The problem is controlled. Pertinent negatives include no anxiety, chest pain, headaches, palpitations or peripheral edema. There are no associated agents to hypertension. Past treatments include beta blockers. The current treatment provides significant improvement. There are no compliance problems.    Hyperlipidemia   This is a chronic problem. The current episode started more than 1 year ago. The problem is controlled. Pertinent negatives include no chest pain or myalgias. Current antihyperlipidemic treatment includes statins (tolerating the atorvastatin). The current treatment provides significant improvement of lipids. There are no compliance problems (denies myalgias with med).  Risk factors for coronary artery disease include diabetes mellitus.   Diabetes   He presents for his follow-up diabetic visit. He has type 2 diabetes mellitus. His disease course has been improving. There are no hypoglycemic associated symptoms. Pertinent negatives for hypoglycemia include no headaches. Associated symptoms include fatigue and foot paresthesias. Pertinent negatives for diabetes include no chest pain and no weakness. Symptoms are improving.       The following portions of the patient's history were reviewed and updated as appropriate: allergies, current medications, past social history and problem list.    Past Medical History:   Diagnosis Date   • CKD (chronic kidney disease)    • Hyperlipidemia    • Hypertension    • Type 2 diabetes mellitus          Current Outpatient Prescriptions:   •  aspirin 325 MG  "tablet, Take  by mouth daily., Disp: , Rfl:   •  atenolol (TENORMIN) 25 MG tablet, Take 1 tablet by mouth Daily., Disp: 90 tablet, Rfl: 3  •  atorvastatin (LIPITOR) 40 MG tablet, Take 1 tablet by mouth Daily., Disp: 90 tablet, Rfl: 3  •  fluticasone (FLONASE) 50 MCG/ACT nasal spray, USE 2 SPRAYS IN EACH       NOSTRIL DAILY, Disp: 16 g, Rfl: 3  •  glucose blood test strip, 1 each by Other route 4 (Four) Times a Day. OneTouch Ultra Blue In Vitro Strip DX: CODE E11.8, Disp: 400 each, Rfl: 3  •  insulin aspart (NOVOLOG FLEXPEN) 100 UNIT/ML solution pen-injector sc pen, Inject 6 Units under the skin 3 (Three) Times a Day With Meals., Disp: 10 pen, Rfl: 1  •  Insulin Degludec (TRESIBA FLEXTOUCH) 200 UNIT/ML solution pen-injector, Inject 44 Units under the skin Daily., Disp: 45 mL, Rfl: 2  •  Insulin Pen Needle (PEN NEEDLES) 29G X 12MM misc, Inject 1 each under the skin 4 (Four) Times a Day. Use with Insulin, Disp: 100 each, Rfl: 1  •  Insulin Pen Needle 32G X 4 MM misc, TID SC, Disp: 100 each, Rfl: 5  •  Insulin Syringe-Needle U-100 30G X 1/2\" 0.5 ML misc, 1 each 5 (Five) Times a Day., Disp: 450 each, Rfl: 2  •  Multiple Vitamin (MULTI VITAMIN DAILY PO), Take  by mouth., Disp: , Rfl:   •  tamsulosin (FLOMAX) 0.4 MG capsule 24 hr capsule, Take 1 capsule by mouth Every Night., Disp: 90 capsule, Rfl: 1    Allergies   Allergen Reactions   • Morphine And Related    • Nabumetone Rash       Review of Systems   Constitutional: Negative for chills, fatigue, fever and unexpected weight change.   HENT: Negative for congestion, ear pain, postnasal drip, sinus pressure, sore throat and trouble swallowing.    Eyes: Negative for visual disturbance.   Respiratory: Negative for cough, chest tightness and wheezing.    Cardiovascular: Negative for chest pain, palpitations and leg swelling.   Gastrointestinal: Negative for abdominal pain, blood in stool, nausea and vomiting.   Genitourinary: Negative for dysuria, frequency and urgency. " "  Musculoskeletal: Negative for arthralgias and joint swelling.   Skin: Negative for color change.   Neurological: Negative for syncope, weakness and headaches.   Hematological: Does not bruise/bleed easily.       Objective   Vitals:    03/20/18 1004   BP: 124/70   BP Location: Left arm   Patient Position: Sitting   Cuff Size: Adult   Weight: 122 kg (269 lb)   Height: 177.8 cm (70\")     Physical Exam   Constitutional: He appears well-developed and well-nourished. He is cooperative. He does not have a sickly appearance. He does not appear ill.   HENT:   Head: Normocephalic.   Right Ear: Hearing, tympanic membrane and external ear normal. No drainage, swelling or tenderness. No mastoid tenderness. Tympanic membrane is not injected, not scarred, not erythematous and not bulging. Tympanic membrane mobility is normal. No middle ear effusion. No decreased hearing is noted.   Left Ear: Hearing, tympanic membrane and external ear normal.   Nose: Nose normal. No mucosal edema, rhinorrhea, sinus tenderness or nasal deformity. Right sinus exhibits no maxillary sinus tenderness and no frontal sinus tenderness. Left sinus exhibits no maxillary sinus tenderness and no frontal sinus tenderness.   Mouth/Throat: Oropharynx is clear and moist and mucous membranes are normal. Normal dentition.   Eyes: Conjunctivae and lids are normal. Pupils are equal, round, and reactive to light. Right eye exhibits no discharge and no exudate. Left eye exhibits no discharge and no exudate.   Neck: Trachea normal and normal range of motion. Carotid bruit is not present. No edema present. No thyroid mass and no thyromegaly present.   Cardiovascular: Regular rhythm, normal heart sounds and normal pulses.    No murmur heard.  Pulmonary/Chest: Breath sounds normal. No respiratory distress. He has no decreased breath sounds. He has no wheezes. He has no rhonchi. He has no rales.   Lymphadenopathy:        Head (right side): No submental, no submandibular, " no tonsillar, no preauricular, no posterior auricular and no occipital adenopathy present.        Head (left side): No submental, no submandibular, no tonsillar, no preauricular, no posterior auricular and no occipital adenopathy present.   Neurological: He is alert.   Skin: Skin is warm, dry and intact. No cyanosis. Nails show no clubbing.       Assessment/Plan   Claude was seen today for hypertension.    Diagnoses and all orders for this visit:    Hypertension, benign  Comments:  stable on current meds    Type 2 diabetes mellitus with diabetic neuropathy, with long-term current use of insulin  Comments:  recent A1c improved to 7.9    Hypercholesterolemia  Comments:  denies myalgias with Atorvastatin, LDL 68 7/12/17 labs    CKD stage 3 due to type 2 diabetes mellitus  Comments:  followed by nephrology, improved after discontinuing Metformin    History of artificial joint  Comments:  has f/u appt with ortho (hx shoulder replacement)    Abdominal aortic aneurysm (AAA) without rupture  Comments:  stable on 1/29/18 ultrasound    Benign prostatic hyperplasia with urinary frequency  Comments:  improved with Flomax, tolerating well    Chronic coronary artery disease  Comments:  managed on Aspirin

## 2018-03-21 PROBLEM — N40.1 BENIGN PROSTATIC HYPERPLASIA WITH URINARY FREQUENCY: Status: ACTIVE | Noted: 2018-03-21

## 2018-03-21 PROBLEM — R35.0 BENIGN PROSTATIC HYPERPLASIA WITH URINARY FREQUENCY: Status: ACTIVE | Noted: 2018-03-21

## 2018-03-21 PROBLEM — Z79.4 ENCOUNTER FOR LONG-TERM (CURRENT) USE OF INSULIN (HCC): Status: RESOLVED | Noted: 2017-01-06 | Resolved: 2018-03-21

## 2018-03-21 RX ORDER — ATENOLOL 25 MG/1
25 TABLET ORAL DAILY
Qty: 90 TABLET | Refills: 3
Start: 2018-03-21 | End: 2018-11-13

## 2018-03-26 ENCOUNTER — TELEPHONE (OUTPATIENT)
Dept: ENDOCRINOLOGY | Age: 78
End: 2018-03-26

## 2018-03-26 NOTE — TELEPHONE ENCOUNTER
----- Message from Miriam Youngblood sent at 3/12/2018 11:39 AM EDT -----  PT WAS CHECKING OUT AND GAVE ME THE INFO FOR WHERE HE GETS HIS TESTING SUPPLIES.    [facility].  [phone]      OK. I WILL KEEP THIS INFORMATION IN CHART FOR FUTURE REFERENCE

## 2018-04-23 ENCOUNTER — PROCEDURE VISIT CONVERTED (OUTPATIENT)
Dept: PODIATRY | Facility: CLINIC | Age: 78
End: 2018-04-23
Attending: PODIATRIST

## 2018-06-04 ENCOUNTER — TELEPHONE (OUTPATIENT)
Dept: INTERNAL MEDICINE | Facility: CLINIC | Age: 78
End: 2018-06-04

## 2018-06-04 RX ORDER — FLUTICASONE PROPIONATE 50 MCG
2 SPRAY, SUSPENSION (ML) NASAL DAILY
Qty: 16 G | Refills: 3 | Status: SHIPPED | OUTPATIENT
Start: 2018-06-04 | End: 2018-10-18 | Stop reason: SDUPTHER

## 2018-06-04 NOTE — TELEPHONE ENCOUNTER
----- Message from Karin Luna sent at 6/4/2018  9:32 AM EDT -----  Contact: pt  Pt is calling for a refill     FOR  fluticasone (FLONASE) 50 MCG/ACT nasal spray      Patient requests RX SENT TO   [facility] [place], [place] - [address] AT Portal to Registered [facility] - [phone] [facility] [phone] FX      Caller#Phone:  M:[phone]     Please and thank you.

## 2018-06-11 ENCOUNTER — TELEPHONE (OUTPATIENT)
Dept: ENDOCRINOLOGY | Age: 78
End: 2018-06-11

## 2018-06-11 RX ORDER — ELECTROLYTES/DEXTROSE
1 SOLUTION, ORAL ORAL 4 TIMES DAILY
Qty: 400 EACH | Refills: 1 | Status: SHIPPED | OUTPATIENT
Start: 2018-06-11 | End: 2018-06-15 | Stop reason: SDUPTHER

## 2018-06-11 NOTE — TELEPHONE ENCOUNTER
----- Message from Luther Fowler sent at 6/11/2018  1:29 PM EDT -----  Contact: PHARMACY   PHARMACY CALLED AND STATE THAT THE BD PEN NEEDLES ARE ON A DELAYED PROVIDER RESPONSE. POSSIBLE INFORMATION MISSING ON SCRIPT, IT WAS UNCLEAR WHICH NEEDLES WERE TO BE FILLED.     [facility] - [place], [place] - [address] AT Portal to [facility] - [phone]  - [phone]  [phone] (Phone)  [phone] (Fax)    PATIENT HAS ABOUT 3 DAYS WORTH, AND USES 4 NEEDLES A DAY.     SCRIPT SENT

## 2018-06-15 ENCOUNTER — TELEPHONE (OUTPATIENT)
Dept: ENDOCRINOLOGY | Age: 78
End: 2018-06-15

## 2018-06-15 RX ORDER — ELECTROLYTES/DEXTROSE
1 SOLUTION, ORAL ORAL 4 TIMES DAILY
Qty: 400 EACH | Refills: 1 | Status: SHIPPED | OUTPATIENT
Start: 2018-06-15

## 2018-06-15 NOTE — TELEPHONE ENCOUNTER
----- Message from Luther Heaton sent at 6/15/2018  9:24 AM EDT -----  Contact: PATIENT  PATIENT RECEIVED A CALL FROM [facility] - [place], [place] - [address] AT Portal to Registered [facility] - [phone]  - [phone] (Phone)  [phone] (Fax). [facility] WANTS A CALL AT     [phone] TO CONFIRM ITS OK FOR THE PATIENT 90 SUPPLY OF  Insulin Pen Needle (PEN NEEDLES) 29G X 12MM misc. PATIENT TESTS 4 TIMES A DAY.      SCRIPT HAS BEEN SENT AUTHORIZING THIS FOR THE PATIENT

## 2018-07-03 ENCOUNTER — PROCEDURE VISIT CONVERTED (OUTPATIENT)
Dept: PODIATRY | Facility: CLINIC | Age: 78
End: 2018-07-03
Attending: PODIATRIST

## 2018-07-03 ENCOUNTER — CONVERSION ENCOUNTER (OUTPATIENT)
Dept: PODIATRY | Facility: CLINIC | Age: 78
End: 2018-07-03

## 2018-07-13 ENCOUNTER — OFFICE VISIT (OUTPATIENT)
Dept: ENDOCRINOLOGY | Age: 78
End: 2018-07-13

## 2018-07-13 VITALS
DIASTOLIC BLOOD PRESSURE: 78 MMHG | SYSTOLIC BLOOD PRESSURE: 132 MMHG | HEIGHT: 70 IN | WEIGHT: 273 LBS | BODY MASS INDEX: 39.08 KG/M2

## 2018-07-13 DIAGNOSIS — IMO0002 UNCONTROLLED TYPE II DIABETES MELLITUS WITH NEPHROPATHY: ICD-10-CM

## 2018-07-13 DIAGNOSIS — N18.30 CKD STAGE 3 DUE TO TYPE 2 DIABETES MELLITUS (HCC): ICD-10-CM

## 2018-07-13 DIAGNOSIS — IMO0002 UNCONTROLLED TYPE 2 DIABETES MELLITUS WITH COMPLICATION, WITH LONG-TERM CURRENT USE OF INSULIN: Primary | ICD-10-CM

## 2018-07-13 DIAGNOSIS — E11.22 CKD STAGE 3 DUE TO TYPE 2 DIABETES MELLITUS (HCC): ICD-10-CM

## 2018-07-13 DIAGNOSIS — E16.1 HYPERINSULINISM: ICD-10-CM

## 2018-07-13 DIAGNOSIS — IMO0002 UNCONTROLLED TYPE 2 DIABETES MELLITUS WITH DIABETIC NEUROPATHY, WITH LONG-TERM CURRENT USE OF INSULIN: ICD-10-CM

## 2018-07-13 DIAGNOSIS — Z79.4 ENCOUNTER FOR LONG-TERM (CURRENT) USE OF INSULIN (HCC): ICD-10-CM

## 2018-07-13 PROCEDURE — 99214 OFFICE O/P EST MOD 30 MIN: CPT | Performed by: INTERNAL MEDICINE

## 2018-07-13 NOTE — PROGRESS NOTES
77 y.o.    Patient Care Team:  Lopez Salas MD (Inactive) as PCP - General (Internal Medicine)  ESTEE Lux as PCP - Claims Attributed  Yossi Saucedo MD as Consulting Physician (Cardiology)  Jordy Rene MD as Consulting Physician (Nephrology)    Chief Complaint:    F/U TYPE 2 DM, UNCONTROLLED. NO RECENT LABS.   Subjective     HPI   Patient is a 77-year-old white male with a past history of uncontrolled type 2 diabetes mellitus came for follow-up  He's a former patient of Dr. Mendosa    Uncontrolled type 2 diabetes mellitus  Patient is currently taking tresiba and NovoLog daily  He takes tresiba 44 units at bedtime and NovoLog 6 units before each meal  Majority of his blood sugars are in the 122 200 range  Hypoglycemia  Patient denied any hypoglycemia in the past 6 months  Uncontrolled type 2 diabetes mellitus with neuropathy  Patient is symptomatic of tingling and burning in both feet  Uncontrolled type 2 diabetes mellitus with nephropathy  Patient has chronic kidney disease with elevated creatinine but apparently it's been improving and he was staged to be stage II recently  Patient has nephrology follow-up  Patient denied any knowledge of diabetic retinopathy  Abnormal weight gain  Patient currently weighs 273 pounds with a BMI of 39.2.  He gained 4 pounds since last visit  He reports that he recently started exercising at home and plans to lose an additional 10 pounds         Interval History     Summary  Patient was diagnosed with uncontrolled type 2 diabetes mellitus in 1997 and has been on medication ever since.      He currently takes Lantus 50 units at night. No other medication for diabetes. He used to be on Byetta and Humalog but apparently due to insurance issues he discontinued both of them. He has been focused on diet and activity and exercise and is managing to keep his blood sugars below 120. For the past few months.. 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 He has no sores or lesions on the feet      Uncontrolled type 2 diabetes mellitus with nephropathy and chronic kidney disease.  Patient has regular follow-up with a nephrologist      Patient denied any knowledge of diabetic retinopathy.  Patient had occasional episodes of hypoglycemia with a blood sugar dropping and to 70s and he becomes symptomatic  He started walking 2 miles every other day and has been controlling his diet very strictly.      Abnormal weight gain  Patient is also gained significant weight over the past several years. He currently weighs 274 pounds and is actively planning to lose weight  The following portions of the patient's history were reviewed and updated as appropriate: allergies, current medications, past family history, past medical history, past social history, past surgical history and problem list.    Past Medical History:   Diagnosis Date   • CKD (chronic kidney disease)    • Hyperlipidemia    • Hypertension    • Type 2 diabetes mellitus (CMS/[facility])      Family History   Problem Relation Age of Onset   • Cancer Mother    • Heart disease Father      Social History     Social History   • Marital status:      Spouse name: N/A   • Number of children: N/A   • Years of education: N/A     Occupational History   • Not on file.     Social History Main Topics   • Smoking status: Former Smoker   • Smokeless tobacco: Never Used   • Alcohol use No   • Drug use: No   • Sexual activity: Not on file     Other Topics Concern   • Not on file     Social History Narrative   • No narrative on file     Allergies   Allergen Reactions   • Morphine And Related    • Nabumetone Rash       Current Outpatient Prescriptions:   •  aspirin 325 MG tablet, Take  by mouth daily., Disp: , Rfl:   •  atenolol (TENORMIN) 25 MG tablet, Take 1  "tablet by mouth Daily., Disp: 90 tablet, Rfl: 3  •  atorvastatin (LIPITOR) 40 MG tablet, Take 1 tablet by mouth Daily., Disp: 90 tablet, Rfl: 3  •  fluticasone (FLONASE) 50 MCG/ACT nasal spray, 2 sprays by Each Nare route Daily., Disp: 16 g, Rfl: 3  •  glucose blood test strip, 1 each by Other route 4 (Four) Times a Day. OneTouch Ultra Blue In Vitro Strip DX: CODE E11.8, Disp: 400 each, Rfl: 3  •  insulin aspart (NOVOLOG FLEXPEN) 100 UNIT/ML solution pen-injector sc pen, Inject 6 Units under the skin 3 (Three) Times a Day With Meals., Disp: 10 pen, Rfl: 1  •  Insulin Degludec (TRESIBA FLEXTOUCH) 200 UNIT/ML solution pen-injector, Inject 44 Units under the skin Daily., Disp: 45 mL, Rfl: 2  •  Insulin Pen Needle (PEN NEEDLES) 29G X 12MM misc, Inject 1 each under the skin 4 (Four) Times a Day. Use with Insulin, Disp: 400 each, Rfl: 1  •  Insulin Pen Needle 32G X 4 MM misc, TID SC, Disp: 100 each, Rfl: 5  •  Insulin Syringe-Needle U-100 30G X 1/2\" 0.5 ML misc, 1 each 5 (Five) Times a Day., Disp: 450 each, Rfl: 2  •  Multiple Vitamin (MULTI VITAMIN DAILY PO), Take  by mouth., Disp: , Rfl:   •  tamsulosin (FLOMAX) 0.4 MG capsule 24 hr capsule, Take 1 capsule by mouth Every Night., Disp: 90 capsule, Rfl: 1        Review of Systems   Constitutional: Negative for fatigue.   HENT: Negative for trouble swallowing.    Eyes: Negative for visual disturbance.   Respiratory: Negative for shortness of breath.    Cardiovascular: Negative for leg swelling.   Neurological: Negative for numbness.   All other systems reviewed and are negative.      Objective       Vitals:    07/13/18 1009   BP: 132/78   Weight: 124 kg (273 lb)   Height: 177.8 cm (70\")     Body mass index is 39.17 kg/m².      Physical Exam   Constitutional: He is oriented to person, place, and time. He appears well-developed and well-nourished.   HENT:   Head: Normocephalic and atraumatic.   Eyes: EOM are normal. Pupils are equal, round, and reactive to light.   Neck: " Normal range of motion. Neck supple. No thyromegaly present.   Cardiovascular: Normal rate, regular rhythm, normal heart sounds and intact distal pulses.    Pulmonary/Chest: Effort normal and breath sounds normal.   Abdominal: Soft. Bowel sounds are normal. He exhibits distension. There is no tenderness.   Lymphadenopathy:     He has no cervical adenopathy.   Neurological: He is alert and oriented to person, place, and time. He has normal reflexes.   Skin: Skin is warm and dry.   Psychiatric: He has a normal mood and affect. His behavior is normal.   Nursing note and vitals reviewed.    Results Review:     I reviewed the patient's new clinical results.    Medical records reviewed  Summary:      Office Visit on 03/12/2018   Component Date Value Ref Range Status   • Glucose 03/12/2018 110* 65 - 99 mg/dL Final   • BUN 03/12/2018 25* 8 - 23 mg/dL Final   • Creatinine 03/12/2018 1.22  0.76 - 1.27 mg/dL Final   • eGFR Non African Am 03/12/2018 57* >60 mL/min/1.73 Final    Comment: The MDRD GFR formula is only valid for adults with stable  renal function between ages 18 and 70.     • eGFR  Am 03/12/2018 70  >60 mL/min/1.73 Final   • BUN/Creatinine Ratio 03/12/2018 20.5  7.0 - 25.0 Final   • Sodium 03/12/2018 142  136 - 145 mmol/L Final   • Potassium 03/12/2018 4.8  3.5 - 5.2 mmol/L Final   • Chloride 03/12/2018 102  98 - 107 mmol/L Final   • Total CO2 03/12/2018 24.5  22.0 - 29.0 mmol/L Final   • Calcium 03/12/2018 9.6  8.6 - 10.5 mg/dL Final   • Total Protein 03/12/2018 6.6  6.0 - 8.5 g/dL Final   • Albumin 03/12/2018 4.10  3.50 - 5.20 g/dL Final   • Globulin 03/12/2018 2.5  gm/dL Final   • A/G Ratio 03/12/2018 1.6  g/dL Final   • Total Bilirubin 03/12/2018 1.0  0.1 - 1.2 mg/dL Final   • Alkaline Phosphatase 03/12/2018 97  39 - 117 U/L Final   • AST (SGOT) 03/12/2018 14  1 - 40 U/L Final   • ALT (SGPT) 03/12/2018 19  1 - 41 U/L Final   • Hemoglobin A1C 03/12/2018 7.90* 4.80 - 5.60 % Final    Comment: Hemoglobin A1C  Ranges:  Increased Risk for Diabetes  5.7% to 6.4%  Diabetes                     >= 6.5%  Diabetic Goal                < 7.0%     • TSH 03/12/2018 3.690  0.270 - 4.200 mIU/mL Final   • Creatinine, Urine 03/12/2018 66.6  Not Estab. mg/dL Final   • Microalbumin, Urine 03/12/2018 140.5  Not Estab. ug/mL Final   • Microalbumin/Creatinine Ratio 03/12/2018 211.0* 0.0 - 30.0 mg/g creat Final     Lab Results   Component Value Date    HGBA1C 7.90 (H) 03/12/2018    HGBA1C 8.70 (H) 10/17/2017    HGBA1C 7.83 (H) 05/09/2017     Lab Results   Component Value Date    MICROALBUR 140.5 03/12/2018    CREATININE 1.22 03/12/2018     Imaging Results (most recent)     None                Assessment and Plan:    Diagnoses and all orders for this visit:    Uncontrolled type 2 diabetes mellitus with complication, with long-term current use of insulin (CMS/[facility])  -     Hemoglobin A1c  -     Comprehensive Metabolic Panel  -     Microalbumin / Creatinine Urine Ratio - Urine, Clean Catch    CKD stage 3 due to type 2 diabetes mellitus (CMS/[facility])    Uncontrolled type II diabetes mellitus with nephropathy (CMS/HCC)  -     Hemoglobin A1c  -     Comprehensive Metabolic Panel  -     Microalbumin / Creatinine Urine Ratio - Urine, Clean Catch    Hyperinsulinism    Uncontrolled type 2 diabetes mellitus with diabetic neuropathy, with long-term current use of insulin (CMS/[facility])    Encounter for long-term (current) use of insulin (CMS/[facility])    Patient's glucose log reviewed  Most of the blood sugars are between 130-200 range  Patient denied any hypoglycemia in the past 3 months    Patient is currently taking tresiba 44 units at bedtime  He is taking NovoLog 6 units with each meal 3 times daily  He has not lost any weight since last visit  Patient is exercising at home on his exercise bike daily    Patient will get lab testing done today  He will return to follow-up in 3-4 months     The total time spent  was more than 25 min of which greater than 15 min of  "time ( greater than 50% of the total time )  was spent face to face with the patient counseling and coordination of care on recommended evaluation and treatment options, instructions for management/treatment and /or follow up  and importance of compliance with chosen management or treatment options      Layton Jolly MD. FACE    07/13/18      EMR Dragon / transcription disclaimer:     \"Dictated utilizing Dragon dictation\".         "

## 2018-07-14 LAB
ALBUMIN SERPL-MCNC: 4.4 G/DL (ref 3.5–5.2)
ALBUMIN/CREAT UR: 93.5 MG/G CREAT (ref 0–30)
ALBUMIN/GLOB SERPL: 2.1 G/DL
ALP SERPL-CCNC: 86 U/L (ref 39–117)
ALT SERPL-CCNC: 16 U/L (ref 1–41)
AST SERPL-CCNC: 13 U/L (ref 1–40)
BILIRUB SERPL-MCNC: 1.1 MG/DL (ref 0.1–1.2)
BUN SERPL-MCNC: 31 MG/DL (ref 8–23)
BUN/CREAT SERPL: 22.5 (ref 7–25)
CALCIUM SERPL-MCNC: 9.1 MG/DL (ref 8.6–10.5)
CHLORIDE SERPL-SCNC: 105 MMOL/L (ref 98–107)
CO2 SERPL-SCNC: 22 MMOL/L (ref 22–29)
CREAT SERPL-MCNC: 1.38 MG/DL (ref 0.76–1.27)
CREAT UR-MCNC: 83.3 MG/DL
GLOBULIN SER CALC-MCNC: 2.1 GM/DL
GLUCOSE SERPL-MCNC: 152 MG/DL (ref 65–99)
HBA1C MFR BLD: 9.1 % (ref 4.8–5.6)
MICROALBUMIN UR-MCNC: 77.9 UG/ML
POTASSIUM SERPL-SCNC: 5 MMOL/L (ref 3.5–5.2)
PROT SERPL-MCNC: 6.5 G/DL (ref 6–8.5)
SODIUM SERPL-SCNC: 138 MMOL/L (ref 136–145)

## 2018-07-24 ENCOUNTER — OFFICE VISIT (OUTPATIENT)
Dept: INTERNAL MEDICINE | Facility: CLINIC | Age: 78
End: 2018-07-24

## 2018-07-24 VITALS
BODY MASS INDEX: 38.94 KG/M2 | DIASTOLIC BLOOD PRESSURE: 78 MMHG | HEART RATE: 58 BPM | SYSTOLIC BLOOD PRESSURE: 124 MMHG | HEIGHT: 70 IN | WEIGHT: 272 LBS | OXYGEN SATURATION: 98 %

## 2018-07-24 DIAGNOSIS — E11.40 TYPE 2 DIABETES MELLITUS WITH DIABETIC NEUROPATHY, WITH LONG-TERM CURRENT USE OF INSULIN (HCC): ICD-10-CM

## 2018-07-24 DIAGNOSIS — E78.00 HYPERCHOLESTEROLEMIA: ICD-10-CM

## 2018-07-24 DIAGNOSIS — I10 HYPERTENSION, BENIGN: Primary | ICD-10-CM

## 2018-07-24 DIAGNOSIS — Z79.4 TYPE 2 DIABETES MELLITUS WITH DIABETIC NEUROPATHY, WITH LONG-TERM CURRENT USE OF INSULIN (HCC): ICD-10-CM

## 2018-07-24 PROCEDURE — 99214 OFFICE O/P EST MOD 30 MIN: CPT | Performed by: NURSE PRACTITIONER

## 2018-07-24 RX ORDER — AMLODIPINE BESYLATE 5 MG/1
5 TABLET ORAL DAILY
Qty: 30 TABLET | Refills: 3 | Status: SHIPPED | OUTPATIENT
Start: 2018-07-24 | End: 2018-08-22 | Stop reason: SDUPTHER

## 2018-07-25 NOTE — PROGRESS NOTES
Subjective   Claude E Johnson is a 77 y.o. male who presents for f/u regarding HTN, hyperlipidemia and DM2.    His recent HgBA1c (10/17) was 9.10, there was a note to increase Tresiba to 50 units (currently at 46 units) but he has not received the message. He has brought home BP readings which are elevated, BP consistently 156-160s/70s despite taking medication regularly. He tried increasing Atenolol for qd to bid as instructed by nephrology but pulse dropped to 40s.      Hypertension   This is a chronic problem. The current episode started more than 1 year ago. The problem is unchanged. The problem is controlled. Pertinent negatives include no anxiety, chest pain, headaches, palpitations or peripheral edema. There are no associated agents to hypertension. Current antihypertension treatment includes beta blockers. The current treatment provides significant improvement. There are no compliance problems.    Hyperlipidemia   This is a chronic problem. The current episode started more than 1 year ago. The problem is controlled. Pertinent negatives include no chest pain or myalgias. Current antihyperlipidemic treatment includes statins (tolerating the atorvastatin). The current treatment provides significant improvement of lipids. There are no compliance problems (denies myalgias with med).  Risk factors for coronary artery disease include diabetes mellitus.   Diabetes   He presents for his follow-up diabetic visit. He has type 2 diabetes mellitus. His disease course has been improving. There are no hypoglycemic associated symptoms. Pertinent negatives for hypoglycemia include no headaches. Associated symptoms include fatigue and foot paresthesias. Pertinent negatives for diabetes include no chest pain and no weakness. Symptoms are improving.        The following portions of the patient's history were reviewed and updated as appropriate: allergies, current medications, past social history and problem list.    Past  Medical History:   Diagnosis Date   • CKD (chronic kidney disease)    • Hyperlipidemia    • Hypertension    • Type 2 diabetes mellitus (CMS/[facility])          Current Outpatient Prescriptions:   •  aspirin 325 MG tablet, Take  by mouth daily., Disp: , Rfl:   •  atenolol (TENORMIN) 25 MG tablet, Take 1 tablet by mouth Daily., Disp: 90 tablet, Rfl: 3  •  atorvastatin (LIPITOR) 40 MG tablet, Take 1 tablet by mouth Daily., Disp: 90 tablet, Rfl: 3  •  fluticasone (FLONASE) 50 MCG/ACT nasal spray, 2 sprays by Each Nare route Daily., Disp: 16 g, Rfl: 3  •  glucose blood test strip, 1 each by Other route 4 (Four) Times a Day. OneTouch Ultra Blue In Vitro Strip DX: CODE E11.8, Disp: 400 each, Rfl: 3  •  insulin aspart (NOVOLOG FLEXPEN) 100 UNIT/ML solution pen-injector sc pen, Inject 6 Units under the skin 3 (Three) Times a Day With Meals., Disp: 10 pen, Rfl: 1  •  Insulin Degludec (TRESIBA FLEXTOUCH) 200 UNIT/ML solution pen-injector, Inject 44 Units under the skin Daily., Disp: 45 mL, Rfl: 2  •  Insulin Pen Needle (PEN NEEDLES) 29G X 12MM misc, Inject 1 each under the skin 4 (Four) Times a Day. Use with Insulin, Disp: 400 each, Rfl: 1  •  Multiple Vitamin (MULTI VITAMIN DAILY PO), Take  by mouth., Disp: , Rfl:   •  tamsulosin (FLOMAX) 0.4 MG capsule 24 hr capsule, Take 1 capsule by mouth Every Night., Disp: 90 capsule, Rfl: 1  •  amLODIPine (NORVASC) 5 MG tablet, Take 1 tablet by mouth Daily., Disp: 30 tablet, Rfl: 3    Allergies   Allergen Reactions   • Morphine And Related    • Nabumetone Rash       Review of Systems   Constitutional: Positive for fatigue. Negative for chills, fever and unexpected weight change.   HENT: Negative for congestion, ear pain, postnasal drip, sinus pressure, sore throat and trouble swallowing.    Eyes: Negative for visual disturbance.   Respiratory: Negative for cough, chest tightness and wheezing.    Cardiovascular: Negative for chest pain, palpitations and leg swelling.   Gastrointestinal:  "Negative for abdominal pain, blood in stool, nausea and vomiting.   Genitourinary: Negative for dysuria, frequency and urgency.   Musculoskeletal: Positive for arthralgias. Negative for joint swelling and myalgias.   Skin: Negative for color change.   Neurological: Negative for syncope, weakness and headaches.   Hematological: Does not bruise/bleed easily.       Objective   Vitals:    07/24/18 1036 07/24/18 1115   BP: 122/80 124/78   BP Location: Left arm Left arm   Patient Position: Sitting    Cuff Size: Adult    Pulse: 58    SpO2: 98%    Weight: 123 kg (272 lb)    Height: 177.8 cm (70\")      Physical Exam   Constitutional: He appears well-developed and well-nourished. He is cooperative. He does not have a sickly appearance. He does not appear ill.   HENT:   Head: Normocephalic.   Right Ear: Hearing, tympanic membrane and external ear normal.   Left Ear: Hearing, tympanic membrane and external ear normal.   Nose: Nose normal. No mucosal edema, rhinorrhea, sinus tenderness or nasal deformity. Right sinus exhibits no maxillary sinus tenderness and no frontal sinus tenderness. Left sinus exhibits no maxillary sinus tenderness and no frontal sinus tenderness.   Mouth/Throat: Oropharynx is clear and moist and mucous membranes are normal. Normal dentition.   Eyes: Conjunctivae and lids are normal. Right eye exhibits no exudate. Left eye exhibits no exudate.   Neck: Trachea normal and normal range of motion. Carotid bruit is not present. No edema present. No thyroid mass present.   Cardiovascular: Regular rhythm, normal heart sounds and normal pulses.    No murmur heard.  Pulmonary/Chest: Breath sounds normal. No respiratory distress. He has no decreased breath sounds. He has no wheezes. He has no rhonchi. He has no rales.   Lymphadenopathy:        Head (right side): No submental, no submandibular, no tonsillar, no preauricular, no posterior auricular and no occipital adenopathy present.        Head (left side): No " submental, no submandibular, no tonsillar, no preauricular, no posterior auricular and no occipital adenopathy present.   Neurological: He is alert.   Skin: Skin is warm, dry and intact. No cyanosis. Nails show no clubbing.       Assessment/Plan   Claude was seen today for hypertension.    Diagnoses and all orders for this visit:    Hypertension, benign  Comments:  add Amlodipine for better BP control  Orders:  -     amLODIPine (NORVASC) 5 MG tablet; Take 1 tablet by mouth Daily.    Hypercholesterolemia  Comments:  LDL 68 with most recent lipid panel, tolerating Atorvastatin    Type 2 diabetes mellitus with diabetic neuropathy, with long-term current use of insulin (CMS/[facility])  Comments:  discussed increasing Tresiba by 2 units every 2-3 days until up to 50 units, monitor for hypoglycemia

## 2018-08-01 ENCOUNTER — TELEPHONE (OUTPATIENT)
Dept: INTERNAL MEDICINE | Facility: CLINIC | Age: 78
End: 2018-08-01

## 2018-08-03 ENCOUNTER — TELEPHONE (OUTPATIENT)
Dept: INTERNAL MEDICINE | Facility: CLINIC | Age: 78
End: 2018-08-03

## 2018-08-03 DIAGNOSIS — N40.1 BENIGN PROSTATIC HYPERPLASIA WITH URINARY FREQUENCY: ICD-10-CM

## 2018-08-03 DIAGNOSIS — R35.0 BENIGN PROSTATIC HYPERPLASIA WITH URINARY FREQUENCY: ICD-10-CM

## 2018-08-03 RX ORDER — TAMSULOSIN HYDROCHLORIDE 0.4 MG/1
1 CAPSULE ORAL NIGHTLY
Qty: 90 CAPSULE | Refills: 1 | Status: SHIPPED | OUTPATIENT
Start: 2018-08-03

## 2018-08-03 NOTE — TELEPHONE ENCOUNTER
----- Message from Mary Obregon sent at 8/3/2018  9:47 AM EDT -----  Contact: pt - Dr Salas' pt - RE: Rx refill  Pt calling and would like a refill on Rx      tamsulosin (FLOMAX) 0.4 MG capsule 24 hr capsule 90 capsule   Sig - Route: Take 1 capsule by mouth Every Night. - Oral     [facility] - [place], [place] - [address] AT Portal to [facility] - [phone]  - [phone] FX    Pt # [phone]

## 2018-08-22 ENCOUNTER — TELEPHONE (OUTPATIENT)
Dept: INTERNAL MEDICINE | Facility: CLINIC | Age: 78
End: 2018-08-22

## 2018-08-22 DIAGNOSIS — I10 HYPERTENSION, BENIGN: ICD-10-CM

## 2018-08-22 RX ORDER — AMLODIPINE BESYLATE 5 MG/1
5 TABLET ORAL DAILY
Qty: 90 TABLET | Refills: 3 | Status: SHIPPED | OUTPATIENT
Start: 2018-08-22 | End: 2019-07-31

## 2018-08-22 NOTE — TELEPHONE ENCOUNTER
----- Message from Karin Luna sent at 8/22/2018  9:35 AM EDT -----  Contact: Pt   Pt is calling for a refill     FOR  amLODIPine (NORVASC) 5 MG tablet      Patient requests RX SENT TO --- wants it changed to mail service since it works.  [facility] [place] [place] - [address] AT Portal to [facility] - [phone] [facility] [phone] FX      Caller# [phone]     Please and thank you.

## 2018-08-30 ENCOUNTER — TELEPHONE (OUTPATIENT)
Dept: INTERNAL MEDICINE | Facility: CLINIC | Age: 78
End: 2018-08-30

## 2018-08-30 NOTE — TELEPHONE ENCOUNTER
Pt came in today and brought BP readings, please advise thanks.                  BP               HR  8/4           156/69         76  5:45pm  8/6           154/84         76  10:45pm  8/7           147/74         79  8:00am  8/8           118/70         75  1:45am   8/9           128/75         73  9:50am  8/10         112/64         77  7:50am  8/13         130/69         76  7:30pm  8/15         152/68         75  11:00am  8/16         148/75         66  3:30pm  8/17         116/70         67  8:00am  8/18         131/67         61  9:30am  8/20         130/67         63  7:00pm  8/21         176/70         67  8:00pm  8/22         144/66         72  9:30pm  8/25         154/67         69  8:30am

## 2018-09-25 ENCOUNTER — PROCEDURE VISIT CONVERTED (OUTPATIENT)
Dept: PODIATRY | Facility: CLINIC | Age: 78
End: 2018-09-25
Attending: PODIATRIST

## 2018-10-11 ENCOUNTER — TELEPHONE (OUTPATIENT)
Dept: INTERNAL MEDICINE | Facility: CLINIC | Age: 78
End: 2018-10-11

## 2018-10-11 NOTE — TELEPHONE ENCOUNTER
----- Message from Beverley Mayer sent at 10/11/2018  3:17 PM EDT -----  Contact: Patient  Patient requesting refill on    amLODIPine (NORVASC) 5 MG tablet 90 day supply    Patient:[phone]    Pharmacy:[facility] - [place], [place] - [address] AT Portal to [facility] - [phone] [facility] [phone]

## 2018-10-11 NOTE — TELEPHONE ENCOUNTER
Pt get his med from his cardiology. He called and said to disregard that refill ans he will contact his cardio

## 2018-10-16 DIAGNOSIS — R35.0 BENIGN PROSTATIC HYPERPLASIA WITH URINARY FREQUENCY: ICD-10-CM

## 2018-10-16 DIAGNOSIS — N40.1 BENIGN PROSTATIC HYPERPLASIA WITH URINARY FREQUENCY: ICD-10-CM

## 2018-10-16 RX ORDER — INSULIN ASPART 100 [IU]/ML
INJECTION, SOLUTION INTRAVENOUS; SUBCUTANEOUS
Qty: 30 ML | Refills: 1 | Status: SHIPPED | OUTPATIENT
Start: 2018-10-16 | End: 2018-11-27

## 2018-10-16 RX ORDER — TAMSULOSIN HYDROCHLORIDE 0.4 MG/1
CAPSULE ORAL
Qty: 90 CAPSULE | Refills: 1 | Status: SHIPPED | OUTPATIENT
Start: 2018-10-16 | End: 2018-11-13

## 2018-10-18 ENCOUNTER — TELEPHONE (OUTPATIENT)
Dept: INTERNAL MEDICINE | Facility: CLINIC | Age: 78
End: 2018-10-18

## 2018-10-18 RX ORDER — FLUTICASONE PROPIONATE 50 MCG
2 SPRAY, SUSPENSION (ML) NASAL DAILY
Qty: 3 BOTTLE | Refills: 3 | Status: SHIPPED | OUTPATIENT
Start: 2018-10-18

## 2018-10-18 NOTE — TELEPHONE ENCOUNTER
----- Message from Mary Obregon sent at 10/18/2018 10:00 AM EDT -----  Contact: pt - Dr Salas' pt - RE: Rx refills    Pt calling and would like a refill on Rx's      tamsulosin (FLOMAX) 0.4 MG capsule 24 hr capsule 90 capsule     Sig - Route: Take 1 capsule by mouth Every Night. - Oral     fluticasone (FLONASE) 50 MCG/ACT nasal spray 16 g     Sig - Route: 2 sprays by Each Nare route Daily. - Each Nare     [facility] [place] [place] - [address] AT Portal to [facility] - [phone]  - [phone] FX      Pt # [phone]

## 2018-11-13 ENCOUNTER — OFFICE VISIT (OUTPATIENT)
Dept: ENDOCRINOLOGY | Age: 78
End: 2018-11-13

## 2018-11-13 VITALS
HEART RATE: 88 BPM | WEIGHT: 270.6 LBS | BODY MASS INDEX: 38.74 KG/M2 | HEIGHT: 70 IN | SYSTOLIC BLOOD PRESSURE: 128 MMHG | DIASTOLIC BLOOD PRESSURE: 82 MMHG

## 2018-11-13 DIAGNOSIS — Z79.4 ENCOUNTER FOR LONG-TERM (CURRENT) USE OF INSULIN (HCC): ICD-10-CM

## 2018-11-13 DIAGNOSIS — N18.30 CKD STAGE 3 DUE TO TYPE 2 DIABETES MELLITUS (HCC): ICD-10-CM

## 2018-11-13 DIAGNOSIS — E16.1 HYPERINSULINISM: ICD-10-CM

## 2018-11-13 DIAGNOSIS — R63.5 ABNORMAL WEIGHT GAIN: ICD-10-CM

## 2018-11-13 DIAGNOSIS — IMO0002 UNCONTROLLED TYPE 2 DIABETES MELLITUS WITH COMPLICATION, WITH LONG-TERM CURRENT USE OF INSULIN: Primary | ICD-10-CM

## 2018-11-13 DIAGNOSIS — IMO0002 UNCONTROLLED TYPE 2 DIABETES MELLITUS WITH DIABETIC NEUROPATHY, WITH LONG-TERM CURRENT USE OF INSULIN: ICD-10-CM

## 2018-11-13 DIAGNOSIS — IMO0002 UNCONTROLLED TYPE II DIABETES MELLITUS WITH NEPHROPATHY: ICD-10-CM

## 2018-11-13 DIAGNOSIS — E11.22 CKD STAGE 3 DUE TO TYPE 2 DIABETES MELLITUS (HCC): ICD-10-CM

## 2018-11-13 LAB
ALBUMIN SERPL-MCNC: 4.2 G/DL (ref 3.5–5.2)
ALBUMIN/GLOB SERPL: 1.4 G/DL
ALP SERPL-CCNC: 119 U/L (ref 39–117)
ALT SERPL-CCNC: 23 U/L (ref 1–41)
AST SERPL-CCNC: 16 U/L (ref 1–40)
BILIRUB SERPL-MCNC: 0.9 MG/DL (ref 0.1–1.2)
BUN SERPL-MCNC: 27 MG/DL (ref 8–23)
BUN/CREAT SERPL: 20.8 (ref 7–25)
CALCIUM SERPL-MCNC: 10.4 MG/DL (ref 8.6–10.5)
CHLORIDE SERPL-SCNC: 104 MMOL/L (ref 98–107)
CO2 SERPL-SCNC: 26.7 MMOL/L (ref 22–29)
CREAT SERPL-MCNC: 1.3 MG/DL (ref 0.76–1.27)
GLOBULIN SER CALC-MCNC: 2.9 GM/DL
GLUCOSE SERPL-MCNC: 144 MG/DL (ref 65–99)
HBA1C MFR BLD: 9.23 % (ref 4.8–5.6)
POTASSIUM SERPL-SCNC: 4.7 MMOL/L (ref 3.5–5.2)
PROT SERPL-MCNC: 7.1 G/DL (ref 6–8.5)
SODIUM SERPL-SCNC: 141 MMOL/L (ref 136–145)
TSH SERPL DL<=0.005 MIU/L-ACNC: 3.82 MIU/ML (ref 0.27–4.2)
UNABLE TO VOID: NORMAL

## 2018-11-13 PROCEDURE — 99214 OFFICE O/P EST MOD 30 MIN: CPT | Performed by: INTERNAL MEDICINE

## 2018-11-13 RX ORDER — IBUPROFEN 200 MG
TABLET ORAL
Qty: 200 EACH | Refills: 3 | Status: SHIPPED | OUTPATIENT
Start: 2018-11-13

## 2018-11-13 NOTE — PROGRESS NOTES
78 y.o.    Patient Care Team:  Lopez Salas MD (Inactive) as PCP - General (Internal Medicine)  Shanice Cummings APRN as PCP - Claims Attributed  [facility]ing [facility] (Cardiology)  Jordy Rene MD as Consulting Physician (Nephrology)    Chief Complaint:      F/U TYPE 2 DIABETES, UNCONTROLLED.  NO RECENT LABS.  Subjective     HPI   Patient is a 78-year-old white male with a past history of uncontrolled type 2 diabetes mellitus came for follow-up  He's a former patient of Dr. Mendosa    Uncontrolled type 2 diabetes mellitus  Patient is currently taking tresiba 44 units at night and NovoLog 6 units before each meal  His glucose log reveals that majority of the blood sugars are between 151-260  Hypoglycemia  Patient denies any recent hypoglycemia  Patient reports that the insulin is costing him a lot of money  Uncontrolled type 2 diabetes mellitus with neuropathy  Patient is symptomatic of tingling and numbness and burning in both feet  Uncontrolled type 2 diabetes mellitus with nephropathy  Patient is chronic kidney disease with elevated creatinine and he was in chronic kidney disease stage II recently  Patient has nephrology follow-up  Patient denied any knowledge of diabetic retinopathy  Abnormal weight gain  Patient currently weighs 270 pounds with a BMI of 39  He lost 3 pounds        Interval History    Summary  Patient was diagnosed with uncontrolled type 2 diabetes mellitus in 1997 and has been on medication ever since.      He currently takes Lantus 50 units at night. No other medication for diabetes. He used to be on Byetta and Humalog but apparently due to insurance issues he discontinued both of them. He has been focused on diet and activity and exercise and is managing to keep his blood sugars below 120. For the past few months.. He recalls his last hemoglobin A1c to be 8.6% prior to above changes      Uncontrolled type 2 diabetes mellitus with  neuropathy  Patient reports symptoms of tingling and numbness especially in the toes and the big toe. He has no sores or lesions on the feet      Uncontrolled type 2 diabetes mellitus with nephropathy and chronic kidney disease.  Patient has regular follow-up with a nephrologist      Patient denied any knowledge of diabetic retinopathy.  Patient had occasional episodes of hypoglycemia with a blood sugar dropping and to 70s and he becomes symptomatic  He started walking 2 miles every other day and has been controlling his diet very strictly.      Abnormal weight gain  Patient is also gained significant weight over the past several years. He currently weighs 274 pounds and is actively planning to lose weight        The following portions of the patient's history were reviewed and updated as appropriate: allergies, current medications, past family history, past medical history, past social history, past surgical history and problem list.    Past Medical History:   Diagnosis Date   • CKD (chronic kidney disease)    • Hyperlipidemia    • Hypertension    • Type 2 diabetes mellitus (CMS/[facility])      Family History   Problem Relation Age of Onset   • Cancer Mother    • Heart disease Father      Social History     Socioeconomic History   • Marital status:      Spouse name: Not on file   • Number of children: Not on file   • Years of education: Not on file   • Highest education level: Not on file   Social Needs   • Financial resource strain: Not on file   • Food insecurity - worry: Not on file   • Food insecurity - inability: Not on file   • Transportation needs - medical: Not on file   • Transportation needs - non-medical: Not on file   Occupational History   • Not on file   Tobacco Use   • Smoking status: Former Smoker   • Smokeless tobacco: Never Used   Substance and Sexual Activity   • Alcohol use: No   • Drug use: No   • Sexual activity: Not on file   Other Topics Concern   • Not on file   Social History Narrative    • Not on file     Allergies   Allergen Reactions   • Morphine Hives   • Morphine And Related    • Nabumetone Rash       Current Outpatient Medications:   •  amLODIPine (NORVASC) 5 MG tablet, Take 1 tablet by mouth Daily., Disp: 90 tablet, Rfl: 3  •  aspirin 325 MG tablet, Take  by mouth daily., Disp: , Rfl:   •  atenolol (TENORMIN) 25 MG tablet, Take 1 tablet by mouth Daily., Disp: 90 tablet, Rfl: 3  •  atorvastatin (LIPITOR) 40 MG tablet, Take 1 tablet by mouth Daily., Disp: 90 tablet, Rfl: 3  •  fluticasone (FLONASE) 50 MCG/ACT nasal spray, 2 sprays by Each Nare route Daily., Disp: 3 bottle, Rfl: 3  •  glucose blood test strip, 1 each by Other route 4 (Four) Times a Day. OneTouch Ultra Blue In Vitro Strip DX: CODE E11.8, Disp: 400 each, Rfl: 3  •  Insulin Degludec (TRESIBA FLEXTOUCH) 200 UNIT/ML solution pen-injector, Inject 44 Units under the skin Daily., Disp: 45 mL, Rfl: 2  •  Insulin Pen Needle (PEN NEEDLES) 29G X 12MM misc, Inject 1 each under the skin 4 (Four) Times a Day. Use with Insulin, Disp: 400 each, Rfl: 1  •  Multiple Vitamin (MULTI VITAMIN DAILY PO), Take  by mouth., Disp: , Rfl:   •  NOVOLOG FLEXPEN 100 UNIT/ML solution pen-injector sc pen, INJECT 6 UNITS UNDER THE   SKIN 3 TIMES A DAY WITH    MEALS, Disp: 30 mL, Rfl: 1  •  tamsulosin (FLOMAX) 0.4 MG capsule 24 hr capsule, Take 1 capsule by mouth Every Night., Disp: 90 capsule, Rfl: 1  •  tamsulosin (FLOMAX) 0.4 MG capsule 24 hr capsule, TAKE 1 CAPSULE EVERY NIGHT, Disp: 90 capsule, Rfl: 1        Review of Systems   Constitutional: Negative for chills, fatigue and fever.   Cardiovascular: Negative for chest pain and palpitations.   Gastrointestinal: Negative for abdominal pain, constipation, diarrhea, nausea and vomiting.   Endocrine: Negative for cold intolerance and heat intolerance.   All other systems reviewed and are negative.      Objective       Vitals:    11/13/18 0925   BP: 128/82   Pulse: 88   Weight: 123 kg (270 lb 9.6 oz)   Height:  "177.8 cm (70\")     Body mass index is 38.83 kg/m².      Physical Exam   Constitutional: He is oriented to person, place, and time. He appears well-developed and well-nourished.   HENT:   Head: Normocephalic and atraumatic.   Eyes: EOM are normal. Pupils are equal, round, and reactive to light.   Neck: Normal range of motion. Neck supple. No thyromegaly present.   Cardiovascular: Normal rate, regular rhythm, normal heart sounds and intact distal pulses.   Pulmonary/Chest: Effort normal and breath sounds normal.   Abdominal: Soft. Bowel sounds are normal. He exhibits distension. There is no tenderness.   Lymphadenopathy:     He has no cervical adenopathy.   Neurological: He is alert and oriented to person, place, and time. He has normal reflexes.   Skin: Skin is warm and dry.   Psychiatric: He has a normal mood and affect. His behavior is normal.   Nursing note and vitals reviewed.    Results Review:     I reviewed the patient's new clinical results.    Medical records reviewed  Summary:      Office Visit on 07/13/2018   Component Date Value Ref Range Status   • Hemoglobin A1C 07/13/2018 9.10* 4.80 - 5.60 % Final    Comment: Hemoglobin A1C Ranges:  Increased Risk for Diabetes  5.7% to 6.4%  Diabetes                     >= 6.5%  Diabetic Goal                < 7.0%     • Glucose 07/13/2018 152* 65 - 99 mg/dL Final   • BUN 07/13/2018 31* 8 - 23 mg/dL Final   • Creatinine 07/13/2018 1.38* 0.76 - 1.27 mg/dL Final   • eGFR Non African Am 07/13/2018 50* >60 mL/min/1.73 Final    Comment: The MDRD GFR formula is only valid for adults with stable  renal function between ages 18 and 70.     • eGFR  Am 07/13/2018 60* >60 mL/min/1.73 Final   • BUN/Creatinine Ratio 07/13/2018 22.5  7.0 - 25.0 Final   • Sodium 07/13/2018 138  136 - 145 mmol/L Final   • Potassium 07/13/2018 5.0  3.5 - 5.2 mmol/L Final   • Chloride 07/13/2018 105  98 - 107 mmol/L Final   • Total CO2 07/13/2018 22.0  22.0 - 29.0 mmol/L Final   • Calcium " "07/13/2018 9.1  8.6 - 10.5 mg/dL Final   • Total Protein 07/13/2018 6.5  6.0 - 8.5 g/dL Final   • Albumin 07/13/2018 4.40  3.50 - 5.20 g/dL Final   • Globulin 07/13/2018 2.1  gm/dL Final   • A/G Ratio 07/13/2018 2.1  g/dL Final   • Total Bilirubin 07/13/2018 1.1  0.1 - 1.2 mg/dL Final   • Alkaline Phosphatase 07/13/2018 86  39 - 117 U/L Final   • AST (SGOT) 07/13/2018 13  1 - 40 U/L Final   • ALT (SGPT) 07/13/2018 16  1 - 41 U/L Final   • Creatinine, Urine 07/13/2018 83.3  Not Estab. mg/dL Final   • Microalbumin, Urine 07/13/2018 77.9  Not Estab. ug/mL Final   • Microalbumin/Creatinine Ratio 07/13/2018 93.5* 0.0 - 30.0 mg/g creat Final     Lab Results   Component Value Date    HGBA1C 9.10 (H) 07/13/2018    HGBA1C 7.90 (H) 03/12/2018    HGBA1C 8.70 (H) 10/17/2017     Lab Results   Component Value Date    MICROALBUR 77.9 07/13/2018    CREATININE 1.38 (H) 07/13/2018     Imaging Results (most recent)     None                Assessment and Plan:    Claude was seen today for diabetes.    Diagnoses and all orders for this visit:    Uncontrolled type 2 diabetes mellitus with complication, with long-term current use of insulin (CMS/[facility])  -     Comprehensive Metabolic Panel  -     Hemoglobin A1c  -     TSH  -     Microalbumin / Creatinine Urine Ratio - Urine, Clean Catch    Uncontrolled type 2 diabetes mellitus with diabetic neuropathy, with long-term current use of insulin (CMS/[facility])    CKD stage 3 due to type 2 diabetes mellitus (CMS/[facility])    Hyperinsulinism    Uncontrolled type II diabetes mellitus with nephropathy (CMS/[facility])    Encounter for long-term (current) use of insulin (CMS/[facility])    Abnormal weight gain    Other orders  -     Insulin Syringe (RELION INSULIN SYRINGE) 29G X 1/2\" 1 ML misc; 4 times daily sc inj  -     insulin NPH (NOVOLIN N) 100 UNIT/ML injection; 25 units before breakfast and 25 units before bedtime  -     insulin regular (NOVOLIN R) 100 UNIT/ML injection; 10 units before breakfast and 8 units at dinner " "sc  -     Unable To Void      Glucose log reviewed  Majority of the blood sugars are in the 100-200 range  The appear to be better than last visit  Patient is currently taking tresiba 44 units at night and NovoLog 6 units before each meal    Patient is reporting that the insulin is costing him a lot of money  I advised him to consider NPH and regular insulin  Patient will take 25 units of NPH insulin before breakfast and bedtime  He will take 10 units of regular insulin before breakfast and 8 units before dinner  Patient was given prescriptions  Patient was given training with insulin syringe  Patient reported that he used to take insulin injections several years ago and is comfortable with that    Patient will get lab testing done today  He will return to follow-up in 3 months.    The total time spent  was more than 25 min of which greater than 15 min of time (greater than 50% of the total time)  was spent face to face with the patient counseling and coordination of care on recommended evaluation and treatment options, instructions for management/treatment and /or follow up and importance of compliance with chosen management or treatment options  Layton Jolly MD. FACE    11/13/18      EMR Dragon / transcription disclaimer:     \"Dictated utilizing Dragon dictation\".         "

## 2018-11-16 ENCOUNTER — TELEPHONE (OUTPATIENT)
Dept: ENDOCRINOLOGY | Age: 78
End: 2018-11-16

## 2018-11-16 RX ORDER — SYRINGE-NEEDLE,INSULIN,0.5 ML 27GX1/2"
1 SYRINGE, EMPTY DISPOSABLE MISCELLANEOUS 4 TIMES DAILY
Qty: 200 EACH | Refills: 1 | Status: SHIPPED | OUTPATIENT
Start: 2018-11-16 | End: 2019-02-23 | Stop reason: SDUPTHER

## 2018-11-16 NOTE — TELEPHONE ENCOUNTER
----- Message from Beverly Dobbins sent at 11/15/2018  2:10 PM EST -----  Contact: [phone]  [facility]    SYRINGES 29 FADIA 1 ML WERE SENT TO THEM     THEY ARE ON BACK ORDER    CAN THEY BE THE 31 FADIA 1 ML    SCRIPT SENT

## 2018-11-25 PROBLEM — R63.5 ABNORMAL WEIGHT GAIN: Status: ACTIVE | Noted: 2018-11-25

## 2018-11-25 PROBLEM — IMO0002 UNCONTROLLED TYPE II DIABETES MELLITUS WITH NEPHROPATHY: Status: ACTIVE | Noted: 2018-11-25

## 2018-11-25 PROBLEM — Z79.4 ENCOUNTER FOR LONG-TERM (CURRENT) USE OF INSULIN (HCC): Status: ACTIVE | Noted: 2018-11-25

## 2018-11-27 ENCOUNTER — OFFICE VISIT (OUTPATIENT)
Dept: INTERNAL MEDICINE | Facility: CLINIC | Age: 78
End: 2018-11-27

## 2018-11-27 VITALS
DIASTOLIC BLOOD PRESSURE: 72 MMHG | OXYGEN SATURATION: 98 % | SYSTOLIC BLOOD PRESSURE: 122 MMHG | BODY MASS INDEX: 38.65 KG/M2 | HEART RATE: 68 BPM | HEIGHT: 70 IN | WEIGHT: 270 LBS

## 2018-11-27 DIAGNOSIS — I71.40 ABDOMINAL AORTIC ANEURYSM (AAA) WITHOUT RUPTURE (HCC): ICD-10-CM

## 2018-11-27 DIAGNOSIS — N18.30 CKD STAGE 3 DUE TO TYPE 2 DIABETES MELLITUS (HCC): ICD-10-CM

## 2018-11-27 DIAGNOSIS — I25.10 CHRONIC CORONARY ARTERY DISEASE: ICD-10-CM

## 2018-11-27 DIAGNOSIS — E78.00 HYPERCHOLESTEROLEMIA: ICD-10-CM

## 2018-11-27 DIAGNOSIS — E11.22 CKD STAGE 3 DUE TO TYPE 2 DIABETES MELLITUS (HCC): ICD-10-CM

## 2018-11-27 DIAGNOSIS — N40.1 BENIGN PROSTATIC HYPERPLASIA WITH URINARY FREQUENCY: ICD-10-CM

## 2018-11-27 DIAGNOSIS — M15.9 GENERALIZED OA: ICD-10-CM

## 2018-11-27 DIAGNOSIS — I10 HYPERTENSION, BENIGN: Primary | ICD-10-CM

## 2018-11-27 DIAGNOSIS — R35.0 BENIGN PROSTATIC HYPERPLASIA WITH URINARY FREQUENCY: ICD-10-CM

## 2018-11-27 DIAGNOSIS — IMO0002 UNCONTROLLED TYPE II DIABETES MELLITUS WITH NEPHROPATHY: ICD-10-CM

## 2018-11-27 LAB
ANION GAP SERPL CALCULATED.3IONS-SCNC: 16.7 MMOL/L
BUN BLD-MCNC: 27 MG/DL (ref 8–23)
BUN/CREAT SERPL: 21.3 (ref 7–25)
CALCIUM SPEC-SCNC: 10.1 MG/DL (ref 8.6–10.5)
CHLORIDE SERPL-SCNC: 103 MMOL/L (ref 98–107)
CHOLEST SERPL-MCNC: 134 MG/DL (ref 0–200)
CO2 SERPL-SCNC: 18.3 MMOL/L (ref 22–29)
CREAT BLD-MCNC: 1.27 MG/DL (ref 0.76–1.27)
GFR SERPL CREATININE-BSD FRML MDRD: 55 ML/MIN/1.73
GLUCOSE BLD-MCNC: 127 MG/DL (ref 65–99)
HDLC SERPL-MCNC: 37 MG/DL (ref 40–60)
LDLC SERPL CALC-MCNC: 73 MG/DL (ref 0–100)
LDLC/HDLC SERPL: 1.98 {RATIO}
POTASSIUM BLD-SCNC: 4 MMOL/L (ref 3.5–5.2)
SODIUM BLD-SCNC: 138 MMOL/L (ref 136–145)
TRIGL SERPL-MCNC: 119 MG/DL (ref 0–150)
VLDLC SERPL-MCNC: 23.8 MG/DL (ref 5–40)

## 2018-11-27 PROCEDURE — 99213 OFFICE O/P EST LOW 20 MIN: CPT | Performed by: NURSE PRACTITIONER

## 2018-11-27 PROCEDURE — 80048 BASIC METABOLIC PNL TOTAL CA: CPT | Performed by: NURSE PRACTITIONER

## 2018-11-27 PROCEDURE — 80061 LIPID PANEL: CPT | Performed by: NURSE PRACTITIONER

## 2018-11-27 PROCEDURE — G0439 PPPS, SUBSEQ VISIT: HCPCS | Performed by: NURSE PRACTITIONER

## 2018-11-27 PROCEDURE — 36415 COLL VENOUS BLD VENIPUNCTURE: CPT | Performed by: NURSE PRACTITIONER

## 2018-11-27 RX ORDER — FUROSEMIDE 20 MG/1
20 TABLET ORAL DAILY
COMMUNITY

## 2018-11-27 NOTE — PATIENT INSTRUCTIONS
Medicare Wellness  Personal Prevention Plan of Service     Date of Office Visit:  2018  Encounter Provider:  ESTEE Abraham  Place of Service:  [facility] INTERNAL [facility]  Patient Name: Claude E Johnson  :  1940    As part of the Medicare Wellness portion of your visit today, we are providing you with this personalized preventive plan of services (PPPS). This plan is based upon recommendations of the [place] Preventive Services Task Force (USPSTF) and the Advisory Committee on Immunization Practices (ACIP).    This lists the preventive care services that should be considered, and provides dates of when you are due. Items listed as completed are up-to-date and do not require any further intervention.    Health Maintenance   Topic Date Due   • TDAP/TD VACCINES (1 - Tdap) 1958   • ZOSTER VACCINE (2 of 2) 2006   • MEDICARE ANNUAL WELLNESS  2018   • HEMOGLOBIN A1C  2019   • COLONOSCOPY  2019   • URINE MICROALBUMIN  2019   • LIPID PANEL  2019   • INFLUENZA VACCINE  Completed   • PNEUMOCOCCAL VACCINES (65+ LOW/MEDIUM RISK)  Completed       Orders Placed This Encounter   Procedures   • Lipid Panel     Standing Status:   Future     Number of Occurrences:   1     Standing Expiration Date:   2019   • Basic Metabolic Panel     Standing Status:   Future     Number of Occurrences:   1     Standing Expiration Date:   2019       Return in about 4 months (around 3/27/2019).

## 2018-11-27 NOTE — PROGRESS NOTES
"Subjective Claude E Johnson is a 78 y.o. male.         History of Present Illness     The following portions of the patient's history were reviewed and updated as appropriate: allergies, current medications, past social history and problem list.    Past Medical History:   Diagnosis Date   • CKD (chronic kidney disease)    • Hyperlipidemia    • Hypertension    • Type 2 diabetes mellitus (CMS/[facility])          Current Outpatient Medications:   •  amLODIPine (NORVASC) 5 MG tablet, Take 1 tablet by mouth Daily., Disp: 90 tablet, Rfl: 3  •  aspirin 325 MG tablet, Take  by mouth daily., Disp: , Rfl:   •  atorvastatin (LIPITOR) 40 MG tablet, Take 1 tablet by mouth Daily., Disp: 90 tablet, Rfl: 3  •  fluticasone (FLONASE) 50 MCG/ACT nasal spray, 2 sprays by Each Nare route Daily., Disp: 3 bottle, Rfl: 3  •  furosemide (LASIX) 20 MG tablet, Take 20 mg by mouth Daily., Disp: , Rfl:   •  glucose blood test strip, 1 each by Other route 4 (Four) Times a Day. OneTouch Ultra Blue In Vitro Strip DX: CODE E11.8, Disp: 400 each, Rfl: 3  •  insulin NPH (NOVOLIN N) 100 UNIT/ML injection, 25 units before breakfast and 25 units before bedtime, Disp: 20 mL, Rfl: 3  •  Insulin Pen Needle (PEN NEEDLES) 29G X 12MM misc, Inject 1 each under the skin 4 (Four) Times a Day. Use with Insulin, Disp: 400 each, Rfl: 1  •  insulin regular (NOVOLIN R) 100 UNIT/ML injection, 10 units before breakfast and 8 units at dinner sc, Disp: 10 mL, Rfl: 3  •  Insulin Syringe (RELION INSULIN SYRINGE) 29G X 1/2\" 1 ML misc, 4 times daily sc inj, Disp: 200 each, Rfl: 3  •  Insulin Syringe-Needle U-100 (RELION INSULIN SYRINGE 1ML/31G) 31G X 5/16\" 1 ML misc, 1 each 4 (Four) Times a Day., Disp: 200 each, Rfl: 1  •  Multiple Vitamin (MULTI VITAMIN DAILY PO), Take  by mouth., Disp: , Rfl:   •  tamsulosin (FLOMAX) 0.4 MG capsule 24 hr capsule, Take 1 capsule by mouth Every Night., Disp: 90 capsule, Rfl: 1    Allergies   Allergen Reactions   • Morphine Hives   • Morphine And " "Related    • Nabumetone Rash       Review of Systems    Objective   Vitals:    11/27/18 1144   BP: 122/72   BP Location: Left arm   Patient Position: Sitting   Pulse: 68   SpO2: 98%   Weight: 122 kg (270 lb)   Height: 177.8 cm (70\")     Physical Exam    Assessment/Plan   There are no diagnoses linked to this encounter.           "

## 2018-11-27 NOTE — PROGRESS NOTES
QUICK REFERENCE INFORMATION:  The ABCs of the Annual Wellness Visit    Subsequent Medicare Wellness Visit    HEALTH RISK ASSESSMENT    1940    Recent Hospitalizations:  No hospitalization(s) within the last year..      Current Medical Providers:  Patient Care Team:  Lopez Salas MD (Inactive) as PCP - General (Internal Medicine)  Shanice Cummings APRN as PCP - Claims Attributed  [facility] as Consulting Physician (Cardiology)  Jordy Rene MD as Consulting Physician (Nephrology)        Smoking Status:  Social History     Tobacco Use   Smoking Status Former Smoker   Smokeless Tobacco Never Used       Alcohol Consumption:  Social History     Substance and Sexual Activity   Alcohol Use No       Depression Screen:   PHQ-2/PHQ-9 Depression Screening 11/27/2018   Little interest or pleasure in doing things 0   Feeling down, depressed, or hopeless 0   Trouble falling or staying asleep, or sleeping too much 0   Feeling tired or having little energy 0   Poor appetite or overeating 0   Feeling bad about yourself - or that you are a failure or have let yourself or your family down 0   Trouble concentrating on things, such as reading the newspaper or watching television 0   Moving or speaking so slowly that other people could have noticed. Or the opposite - being so fidgety or restless that you have been moving around a lot more than usual 0   Thoughts that you would be better off dead, or of hurting yourself in some way 0   Total Score 0   If you checked off any problems, how difficult have these problems made it for you to do your work, take care of things at home, or get along with other people? -       Health Habits and Functional and Cognitive Screening:  Functional & Cognitive Status 11/27/2018   Do you have difficulty preparing food and eating? No   Do you have difficulty bathing yourself, getting dressed or grooming yourself? No   Do you have difficulty using the toilet? No   Do  you have difficulty moving around from place to place? No   Do you have trouble with steps or getting out of a bed or a chair? No   In the past year have you fallen or experienced a near fall? No   Current Diet Well Balanced Diet   Dental Exam Up to date   Eye Exam Up to date   Exercise (times per week) 3 times per week   Current Exercise Activities Include Walking   Do you need help using the phone?  No   Are you deaf or do you have serious difficulty hearing?  No   Do you need help with transportation? No   Do you need help shopping? No   Do you need help preparing meals?  No   Do you need help with housework?  No   Do you need help with laundry? No   Do you need help taking your medications? No   Do you need help managing money? No   Do you ever drive or ride in a car without wearing a seat belt? No   Have you felt unusual stress, anger or loneliness in the last month? No   Who do you live with? Alone   If you need help, do you have trouble finding someone available to you? No   Have you been bothered in the last four weeks by sexual problems? No   Do you have difficulty concentrating, remembering or making decisions? No           Does the patient have evidence of cognitive impairment? No    Aspirin use counseling: Taking ASA appropriately as indicated      Recent Lab Results:  CMP:  Lab Results   Component Value Date     (H) 11/13/2018    BUN 27 (H) 11/13/2018    CREATININE 1.30 (H) 11/13/2018    EGFRIFNONA 53 (L) 11/13/2018    EGFRIFAFRI 65 11/13/2018    BCR 20.8 11/13/2018     11/13/2018    K 4.7 11/13/2018    CO2 26.7 11/13/2018    CALCIUM 10.4 11/13/2018    PROTENTOTREF 7.1 11/13/2018    ALBUMIN 4.20 11/13/2018    LABGLOBREF 2.9 11/13/2018    LABIL2 1.4 11/13/2018    BILITOT 0.9 11/13/2018    ALKPHOS 119 (H) 11/13/2018    AST 16 11/13/2018    ALT 23 11/13/2018     Lipid Panel:  Lab Results   Component Value Date    TRIG 139 07/12/2017    HDL 41 07/12/2017    VLDL 27.8 07/12/2017     HbA1c:  Lab  Results   Component Value Date    HGBA1C 9.23 (H) 11/13/2018       Visual Acuity:  No exam data present    Age-appropriate Screening Schedule:  Refer to the list below for future screening recommendations based on patient's age, sex and/or medical conditions. Orders for these recommended tests are listed in the plan section. The patient has been provided with a written plan.    Health Maintenance   Topic Date Due   • TDAP/TD VACCINES (1 - Tdap) 09[birth year]06/1959   • ZOSTER VACCINE (2 of 2) 02/26/2006   • LIPID PANEL  07/12/2018   • HEMOGLOBIN A1C  05/13/2019   • COLONOSCOPY  05/17/2019   • URINE MICROALBUMIN  07/13/2019   • INFLUENZA VACCINE  Completed   • PNEUMOCOCCAL VACCINES (65+ LOW/MEDIUM RISK)  Completed        Subjective   History of Present Illness    Claude E Johnson is a 78 y.o. male who presents for an Subsequent Wellness Visit.    The following portions of the patient's history were reviewed and updated as appropriate: allergies, current medications, past family history, past medical history, past social history, past surgical history and problem list.    Outpatient Medications Prior to Visit   Medication Sig Dispense Refill   • amLODIPine (NORVASC) 5 MG tablet Take 1 tablet by mouth Daily. 90 tablet 3   • aspirin 325 MG tablet Take  by mouth daily.     • atorvastatin (LIPITOR) 40 MG tablet Take 1 tablet by mouth Daily. 90 tablet 3   • fluticasone (FLONASE) 50 MCG/ACT nasal spray 2 sprays by Each Nare route Daily. 3 bottle 3   • furosemide (LASIX) 20 MG tablet Take 20 mg by mouth Daily.     • glucose blood test strip 1 each by Other route 4 (Four) Times a Day. OneTouch Ultra Blue In Vitro Strip DX: CODE E11.8 400 each 3   • insulin NPH (NOVOLIN N) 100 UNIT/ML injection 25 units before breakfast and 25 units before bedtime 20 mL 3   • Insulin Pen Needle (PEN NEEDLES) 29G X 12MM misc Inject 1 each under the skin 4 (Four) Times a Day. Use with Insulin 400 each 1   • insulin regular (NOVOLIN R) 100 UNIT/ML  "injection 10 units before breakfast and 8 units at dinner sc 10 mL 3   • Insulin Syringe (RELION INSULIN SYRINGE) 29G X 1/2\" 1 ML misc 4 times daily sc inj 200 each 3   • Insulin Syringe-Needle U-100 (RELION INSULIN SYRINGE 1ML/31G) 31G X 5/16\" 1 ML misc 1 each 4 (Four) Times a Day. 200 each 1   • Multiple Vitamin (MULTI VITAMIN DAILY PO) Take  by mouth.     • tamsulosin (FLOMAX) 0.4 MG capsule 24 hr capsule Take 1 capsule by mouth Every Night. 90 capsule 1   • Insulin Degludec (TRESIBA FLEXTOUCH) 200 UNIT/ML solution pen-injector Inject 44 Units under the skin Daily. 45 mL 2   • NOVOLOG FLEXPEN 100 UNIT/ML solution pen-injector sc pen INJECT 6 UNITS UNDER THE   SKIN 3 TIMES A DAY WITH    MEALS 30 mL 1     No facility-administered medications prior to visit.        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    Review of Systems    Compared to one year ago, the patient feels his physical health is the same.  Compared to one year ago, the patient feels his mental health is the same.    Objective     Physical Exam    Vitals:    11/27/18 1144   BP: 122/72   BP Location: Left arm   Patient Position: Sitting   Pulse: 68 " "  SpO2: 98%   Weight: 122 kg (270 lb)   Height: 177.8 cm (70\")   PainSc: 0-No pain       Patient's Body mass index is 38.74 kg/m². BMI is above normal parameters. Recommendations include: nutrition counseling.      Assessment/Plan   Patient Self-Management and Personalized Health Advice  The patient has been provided with information about: diet, exercise, weight management, prevention of cardiac or vascular disease and the relationship between weight and GERD and preventive services including:   · Diabetes screening, see lab orders, Fall Risk assessment done, Nutrition counseling provided.    Visit Diagnoses:    ICD-10-CM ICD-9-CM   1. Hypercholesterolemia E78.00 272.0   2. Hypertension, benign I10 401.1   3. Type 2 diabetes mellitus with diabetic neuropathy, with long-term current use of insulin (CMS/HCC) E11.40 250.60    Z79.4 357.2     V58.67   4. CKD stage 3 due to type 2 diabetes mellitus (CMS/HCC) E11.22 250.40    N18.3 585.3       Orders Placed This Encounter   Procedures   • Lipid Panel     Standing Status:   Future     Standing Expiration Date:   11/27/2019   • Basic Metabolic Panel     Standing Status:   Future     Standing Expiration Date:   11/27/2019       Outpatient Encounter Medications as of 11/27/2018   Medication Sig Dispense Refill   • amLODIPine (NORVASC) 5 MG tablet Take 1 tablet by mouth Daily. 90 tablet 3   • aspirin 325 MG tablet Take  by mouth daily.     • atorvastatin (LIPITOR) 40 MG tablet Take 1 tablet by mouth Daily. 90 tablet 3   • fluticasone (FLONASE) 50 MCG/ACT nasal spray 2 sprays by Each Nare route Daily. 3 bottle 3   • furosemide (LASIX) 20 MG tablet Take 20 mg by mouth Daily.     • glucose blood test strip 1 each by Other route 4 (Four) Times a Day. OneTouch Ultra Blue In Vitro Strip DX: CODE E11.8 400 each 3   • insulin NPH (NOVOLIN N) 100 UNIT/ML injection 25 units before breakfast and 25 units before bedtime 20 mL 3   • Insulin Pen Needle (PEN NEEDLES) 29G X 12MM misc Inject " "1 each under the skin 4 (Four) Times a Day. Use with Insulin 400 each 1   • insulin regular (NOVOLIN R) 100 UNIT/ML injection 10 units before breakfast and 8 units at dinner sc 10 mL 3   • Insulin Syringe (RELION INSULIN SYRINGE) 29G X 1/2\" 1 ML misc 4 times daily sc inj 200 each 3   • Insulin Syringe-Needle U-100 (RELION INSULIN SYRINGE 1ML/31G) 31G X 5/16\" 1 ML misc 1 each 4 (Four) Times a Day. 200 each 1   • Multiple Vitamin (MULTI VITAMIN DAILY PO) Take  by mouth.     • tamsulosin (FLOMAX) 0.4 MG capsule 24 hr capsule Take 1 capsule by mouth Every Night. 90 capsule 1   • [DISCONTINUED] Insulin Degludec (TRESIBA FLEXTOUCH) 200 UNIT/ML solution pen-injector Inject 44 Units under the skin Daily. 45 mL 2   • [DISCONTINUED] NOVOLOG FLEXPEN 100 UNIT/ML solution pen-injector sc pen INJECT 6 UNITS UNDER THE   SKIN 3 TIMES A DAY WITH    MEALS 30 mL 1     No facility-administered encounter medications on file as of 11/27/2018.        Reviewed use of high risk medication in the elderly: yes  Reviewed for potential of harmful drug interactions in the elderly: yes    Follow Up:  No Follow-up on file.     An After Visit Summary and PPPS with all of these plans were given to the patient.         "

## 2018-11-28 NOTE — PROGRESS NOTES
Subjective   Claude E Johnson is a 78 y.o. male who presents for f/u regarding DM2, HTN and hyperlipidemia.    His Tresiba was recently discontinued, now taking Novolin N and R with good improved glucose readings (has appt in Feb with endocrinology). Denies hypoglycemic episodes.  He is walking 3-4 times per week, during cold weather he rides his exercise bike.      Hypertension   This is a chronic problem. The current episode started more than 1 year ago. The problem is unchanged. The problem is controlled. Pertinent negatives include no anxiety, chest pain, headaches, palpitations or peripheral edema. There are no associated agents to hypertension. Current antihypertension treatment includes beta blockers. The current treatment provides significant improvement. There are no compliance problems.    Hyperlipidemia   This is a chronic problem. The current episode started more than 1 year ago. The problem is controlled. Pertinent negatives include no chest pain or myalgias. Current antihyperlipidemic treatment includes statins (tolerating the atorvastatin). The current treatment provides significant improvement of lipids. There are no compliance problems (denies myalgias with med).  Risk factors for coronary artery disease include diabetes mellitus.   Diabetes   He presents for his follow-up diabetic visit. He has type 2 diabetes mellitus. His disease course has been improving. There are no hypoglycemic associated symptoms. Pertinent negatives for hypoglycemia include no headaches. Associated symptoms include fatigue and foot paresthesias. Pertinent negatives for diabetes include no chest pain and no weakness. Symptoms are improving.        The following portions of the patient's history were reviewed and updated as appropriate: allergies, current medications, past social history and problem list.    Past Medical History:   Diagnosis Date   • CKD (chronic kidney disease)    • Hyperlipidemia    • Hypertension    • Type  "2 diabetes mellitus (CMS/[facility])          Current Outpatient Medications:   •  amLODIPine (NORVASC) 5 MG tablet, Take 1 tablet by mouth Daily., Disp: 90 tablet, Rfl: 3  •  aspirin 325 MG tablet, Take  by mouth daily., Disp: , Rfl:   •  atorvastatin (LIPITOR) 40 MG tablet, Take 1 tablet by mouth Daily., Disp: 90 tablet, Rfl: 3  •  fluticasone (FLONASE) 50 MCG/ACT nasal spray, 2 sprays by Each Nare route Daily., Disp: 3 bottle, Rfl: 3  •  furosemide (LASIX) 20 MG tablet, Take 20 mg by mouth Daily., Disp: , Rfl:   •  glucose blood test strip, 1 each by Other route 4 (Four) Times a Day. OneTouch Ultra Blue In Vitro Strip DX: CODE E11.8, Disp: 400 each, Rfl: 3  •  insulin NPH (NOVOLIN N) 100 UNIT/ML injection, 25 units before breakfast and 25 units before bedtime, Disp: 20 mL, Rfl: 3  •  Insulin Pen Needle (PEN NEEDLES) 29G X 12MM misc, Inject 1 each under the skin 4 (Four) Times a Day. Use with Insulin, Disp: 400 each, Rfl: 1  •  insulin regular (NOVOLIN R) 100 UNIT/ML injection, 10 units before breakfast and 8 units at dinner sc, Disp: 10 mL, Rfl: 3  •  Insulin Syringe (RELION INSULIN SYRINGE) 29G X 1/2\" 1 ML misc, 4 times daily sc inj, Disp: 200 each, Rfl: 3  •  Insulin Syringe-Needle U-100 (RELION INSULIN SYRINGE 1ML/31G) 31G X 5/16\" 1 ML misc, 1 each 4 (Four) Times a Day., Disp: 200 each, Rfl: 1  •  Multiple Vitamin (MULTI VITAMIN DAILY PO), Take  by mouth., Disp: , Rfl:   •  tamsulosin (FLOMAX) 0.4 MG capsule 24 hr capsule, Take 1 capsule by mouth Every Night., Disp: 90 capsule, Rfl: 1    Allergies   Allergen Reactions   • Morphine Hives   • Morphine And Related    • Nabumetone Rash       Review of Systems   Constitutional: Positive for fatigue. Negative for chills, fever and unexpected weight change.   HENT: Negative for congestion, ear pain, postnasal drip, sinus pressure, sore throat and trouble swallowing.    Eyes: Negative for visual disturbance.   Respiratory: Negative for cough, chest tightness and wheezing.  " "  Cardiovascular: Positive for leg swelling (intermittent swelling of bilateral legs, taking Lasix daily). Negative for chest pain and palpitations.   Gastrointestinal: Negative for abdominal pain, blood in stool, nausea and vomiting.   Genitourinary: Negative for dysuria, frequency and urgency.   Musculoskeletal: Positive for arthralgias. Negative for joint swelling and myalgias.   Skin: Negative for color change.   Neurological: Negative for syncope, weakness and headaches.   Hematological: Does not bruise/bleed easily.       Objective   Vitals:    11/27/18 1144   BP: 122/72   BP Location: Left arm   Patient Position: Sitting   Pulse: 68   SpO2: 98%   Weight: 122 kg (270 lb)   Height: 177.8 cm (70\")     Physical Exam   Constitutional: He appears well-developed and well-nourished. He is cooperative. He does not have a sickly appearance. He does not appear ill.   HENT:   Head: Normocephalic.   Right Ear: Hearing, tympanic membrane and external ear normal.   Left Ear: Hearing, tympanic membrane and external ear normal.   Nose: Nose normal. No mucosal edema, rhinorrhea, sinus tenderness or nasal deformity. Right sinus exhibits no maxillary sinus tenderness and no frontal sinus tenderness. Left sinus exhibits no maxillary sinus tenderness and no frontal sinus tenderness.   Mouth/Throat: Oropharynx is clear and moist and mucous membranes are normal. Normal dentition.   Eyes: Conjunctivae and lids are normal. Right eye exhibits no discharge and no exudate. Left eye exhibits no discharge and no exudate.   Neck: Trachea normal. Carotid bruit is not present. No edema present. No thyroid mass present.   Cardiovascular: Regular rhythm, normal heart sounds and normal pulses.   No murmur heard.  Pulmonary/Chest: Breath sounds normal. No respiratory distress. He has no decreased breath sounds. He has no wheezes. He has no rhonchi. He has no rales.   Abdominal: Soft. There is no tenderness.   Lymphadenopathy:        Head (right " side): No submental, no submandibular, no tonsillar, no preauricular, no posterior auricular and no occipital adenopathy present.        Head (left side): No submental, no submandibular, no tonsillar, no preauricular, no posterior auricular and no occipital adenopathy present.   Neurological: He is alert.   Skin: Skin is warm, dry and intact. No cyanosis. Nails show no clubbing.       Assessment/Plan   Claude was seen today for medicare wellness-subsequent, hypertension, hyperlipidemia and diabetes.    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 (CMS/[facility])  Comments:  recent (9/23) A1c 9.25, started Novolin N and Novolin R    Abdominal aortic aneurysm (AAA) without rupture (CMS/[facility])  Comments:  3.4 cm (stable) on 11/2/18 abdominal ultrasound    Chronic coronary artery disease  Comments:  followed by cardiology    Benign prostatic hyperplasia with urinary frequency  Comments:  managed on daily Flomax    Generalized OA  Comments:  history bilateral knee replacement, shoulder surgery, right ankle reconstruction    Discussed Shingrix vaccine, he will check with pharmacy regarding coverage and availability.

## 2018-12-11 ENCOUNTER — TELEPHONE (OUTPATIENT)
Dept: ENDOCRINOLOGY | Age: 78
End: 2018-12-11

## 2018-12-11 RX ORDER — BLOOD-GLUCOSE METER
1 EACH MISCELLANEOUS 4 TIMES DAILY
Qty: 1 EACH | Refills: 1 | Status: SHIPPED | OUTPATIENT
Start: 2018-12-11 | End: 2020-01-23

## 2018-12-11 NOTE — TELEPHONE ENCOUNTER
----- Message from Na Panda MA sent at 12/11/2018  2:52 PM EST -----  Pt is needing onetouch ultra test strips, meter, and lancets sent to [facility] in pt's chart. He is testing 3 times daily      Script sent

## 2018-12-18 ENCOUNTER — PROCEDURE VISIT CONVERTED (OUTPATIENT)
Dept: PODIATRY | Facility: CLINIC | Age: 78
End: 2018-12-18
Attending: PODIATRIST

## 2018-12-18 ENCOUNTER — CONVERSION ENCOUNTER (OUTPATIENT)
Dept: PODIATRY | Facility: CLINIC | Age: 78
End: 2018-12-18

## 2019-02-19 ENCOUNTER — OFFICE VISIT (OUTPATIENT)
Dept: ENDOCRINOLOGY | Age: 79
End: 2019-02-19

## 2019-02-19 VITALS
HEART RATE: 81 BPM | WEIGHT: 273.8 LBS | BODY MASS INDEX: 39.2 KG/M2 | HEIGHT: 70 IN | SYSTOLIC BLOOD PRESSURE: 140 MMHG | DIASTOLIC BLOOD PRESSURE: 96 MMHG

## 2019-02-19 DIAGNOSIS — IMO0002 UNCONTROLLED TYPE 2 DIABETES MELLITUS WITH DIABETIC NEUROPATHY, WITH LONG-TERM CURRENT USE OF INSULIN: ICD-10-CM

## 2019-02-19 DIAGNOSIS — IMO0002 UNCONTROLLED TYPE 2 DIABETES MELLITUS WITH COMPLICATION, WITH LONG-TERM CURRENT USE OF INSULIN: Primary | ICD-10-CM

## 2019-02-19 DIAGNOSIS — E11.22 CKD STAGE 3 DUE TO TYPE 2 DIABETES MELLITUS (HCC): ICD-10-CM

## 2019-02-19 DIAGNOSIS — E16.1 HYPERINSULINISM: ICD-10-CM

## 2019-02-19 DIAGNOSIS — IMO0002 UNCONTROLLED TYPE II DIABETES MELLITUS WITH NEPHROPATHY: ICD-10-CM

## 2019-02-19 DIAGNOSIS — N18.30 CKD STAGE 3 DUE TO TYPE 2 DIABETES MELLITUS (HCC): ICD-10-CM

## 2019-02-19 PROCEDURE — 99214 OFFICE O/P EST MOD 30 MIN: CPT | Performed by: INTERNAL MEDICINE

## 2019-02-19 RX ORDER — ZOSTER VACCINE RECOMBINANT, ADJUVANTED 50 MCG/0.5
KIT INTRAMUSCULAR
COMMUNITY
Start: 2019-01-15 | End: 2019-03-27

## 2019-02-20 LAB
ALBUMIN SERPL-MCNC: 4.1 G/DL (ref 3.5–4.8)
ALBUMIN/GLOB SERPL: 1.6 {RATIO} (ref 1.2–2.2)
ALP SERPL-CCNC: 112 IU/L (ref 39–117)
ALT SERPL-CCNC: 19 IU/L (ref 0–44)
AST SERPL-CCNC: 16 IU/L (ref 0–40)
BILIRUB SERPL-MCNC: 0.8 MG/DL (ref 0–1.2)
BUN SERPL-MCNC: 25 MG/DL (ref 8–27)
BUN/CREAT SERPL: 18 (ref 10–24)
CALCIUM SERPL-MCNC: 9.7 MG/DL (ref 8.6–10.2)
CHLORIDE SERPL-SCNC: 105 MMOL/L (ref 96–106)
CO2 SERPL-SCNC: 21 MMOL/L (ref 20–29)
CREAT SERPL-MCNC: 1.37 MG/DL (ref 0.76–1.27)
GLOBULIN SER CALC-MCNC: 2.6 G/DL (ref 1.5–4.5)
GLUCOSE SERPL-MCNC: 115 MG/DL (ref 65–99)
HBA1C MFR BLD: 9.7 % (ref 4.8–5.6)
INTERPRETATION: NORMAL
Lab: NORMAL
POTASSIUM SERPL-SCNC: 4.7 MMOL/L (ref 3.5–5.2)
PROT SERPL-MCNC: 6.7 G/DL (ref 6–8.5)
SODIUM SERPL-SCNC: 140 MMOL/L (ref 134–144)

## 2019-02-25 RX ORDER — SYRINGE AND NEEDLE,INSULIN,1ML 31 GX5/16"
SYRINGE, EMPTY DISPOSABLE MISCELLANEOUS
Qty: 200 EACH | Refills: 1 | Status: SHIPPED | OUTPATIENT
Start: 2019-02-25

## 2019-03-19 ENCOUNTER — PROCEDURE VISIT CONVERTED (OUTPATIENT)
Dept: PODIATRY | Facility: CLINIC | Age: 79
End: 2019-03-19
Attending: PODIATRIST

## 2019-03-21 RX ORDER — ATORVASTATIN CALCIUM 40 MG/1
TABLET, FILM COATED ORAL
Qty: 90 TABLET | Refills: 3 | Status: SHIPPED | OUTPATIENT
Start: 2019-03-21

## 2019-03-27 ENCOUNTER — OFFICE VISIT (OUTPATIENT)
Dept: INTERNAL MEDICINE | Facility: CLINIC | Age: 79
End: 2019-03-27

## 2019-03-27 VITALS
BODY MASS INDEX: 38.8 KG/M2 | HEART RATE: 81 BPM | HEIGHT: 70 IN | WEIGHT: 271 LBS | DIASTOLIC BLOOD PRESSURE: 82 MMHG | OXYGEN SATURATION: 98 % | SYSTOLIC BLOOD PRESSURE: 130 MMHG

## 2019-03-27 DIAGNOSIS — I10 HYPERTENSION, BENIGN: Primary | ICD-10-CM

## 2019-03-27 DIAGNOSIS — E11.22 CKD STAGE 2 DUE TO TYPE 2 DIABETES MELLITUS (HCC): ICD-10-CM

## 2019-03-27 DIAGNOSIS — IMO0002 UNCONTROLLED TYPE II DIABETES MELLITUS WITH NEPHROPATHY: ICD-10-CM

## 2019-03-27 DIAGNOSIS — E78.00 HYPERCHOLESTEROLEMIA: ICD-10-CM

## 2019-03-27 DIAGNOSIS — N18.2 CKD STAGE 2 DUE TO TYPE 2 DIABETES MELLITUS (HCC): ICD-10-CM

## 2019-03-27 LAB
BILIRUB UR QL STRIP: NEGATIVE
CLARITY UR: CLEAR
COLOR UR: YELLOW
GLUCOSE UR STRIP-MCNC: NEGATIVE MG/DL
HGB UR QL STRIP.AUTO: NEGATIVE
KETONES UR QL STRIP: NEGATIVE
LEUKOCYTE ESTERASE UR QL STRIP.AUTO: NEGATIVE
NITRITE UR QL STRIP: NEGATIVE
PH UR STRIP.AUTO: <=5 [PH] (ref 5–8)
PROT UR QL STRIP: NEGATIVE
SP GR UR STRIP: 1.01 (ref 1–1.03)
UROBILINOGEN UR QL STRIP: NORMAL

## 2019-03-27 PROCEDURE — 81003 URINALYSIS AUTO W/O SCOPE: CPT | Performed by: NURSE PRACTITIONER

## 2019-03-27 PROCEDURE — 99214 OFFICE O/P EST MOD 30 MIN: CPT | Performed by: NURSE PRACTITIONER

## 2019-03-28 NOTE — PROGRESS NOTES
Subjective   Claude E Johnson is a 78 y.o. male who presents for f/u regarding DM2, HTN and hyperlipidemia.    She has seen derm recently and had several actinic keratosis removed.  His recent eye exam (done at [facility]) did not show any abnormalities.  He is also followed by podiatry (Dr. Eugene) for regular foot exams and nail care.  His recent A1c was elevated, he has adjusted his insulin dose for improved glucose.      Hypertension   This is a chronic problem. The current episode started more than 1 year ago. The problem is unchanged. Pertinent negatives include no blurred vision, chest pain, headaches or palpitations. Current antihypertension treatment includes calcium channel blockers. The current treatment provides significant improvement. There are no compliance problems.    Diabetes   He presents for his follow-up diabetic visit. He has type 2 diabetes mellitus. His disease course has been worsening. There are no hypoglycemic associated symptoms. Pertinent negatives for hypoglycemia include no headaches. Associated symptoms include fatigue. Pertinent negatives for diabetes include no blurred vision, no chest pain, no foot ulcerations and no weakness. Symptoms are worsening. Current diabetic treatment includes insulin injections.        The following portions of the patient's history were reviewed and updated as appropriate: allergies, current medications, past social history and problem list.    Past Medical History:   Diagnosis Date   • CKD (chronic kidney disease)    • Hyperlipidemia    • Hypertension    • Type 2 diabetes mellitus (CMS/[facility])          Current Outpatient Medications:   •  amLODIPine (NORVASC) 5 MG tablet, Take 1 tablet by mouth Daily. (Patient taking differently: Take 5 mg by mouth 2 (Two) Times a Day.), Disp: 90 tablet, Rfl: 3  •  aspirin 325 MG tablet, Take  by mouth daily., Disp: , Rfl:   •  atorvastatin (LIPITOR) 40 MG tablet, TAKE 1 TABLET DAILY, Disp: 90 tablet, Rfl: 3  •   "Blood Glucose Monitoring Suppl (ONE TOUCH ULTRA 2) w/Device kit, 1 each 4 (Four) Times a Day., Disp: 1 each, Rfl: 1  •  fluticasone (FLONASE) 50 MCG/ACT nasal spray, 2 sprays by Each Nare route Daily., Disp: 3 bottle, Rfl: 3  •  furosemide (LASIX) 20 MG tablet, Take 20 mg by mouth Daily., Disp: , Rfl:   •  insulin NPH (NOVOLIN N) 100 UNIT/ML injection, 25 units before breakfast and 25 units before bedtime, Disp: 20 mL, Rfl: 3  •  Insulin Pen Needle (PEN NEEDLES) 29G X 12MM misc, Inject 1 each under the skin 4 (Four) Times a Day. Use with Insulin, Disp: 400 each, Rfl: 1  •  insulin regular (NOVOLIN R) 100 UNIT/ML injection, 10 units before breakfast and 8 units at dinner sc, Disp: 10 mL, Rfl: 3  •  Insulin Syringe (RELION INSULIN SYRINGE) 29G X 1/2\" 1 ML misc, 4 times daily sc inj, Disp: 200 each, Rfl: 3  •  Multiple Vitamin (MULTI VITAMIN DAILY PO), Take  by mouth., Disp: , Rfl:   •  ONE TOUCH ULTRA TEST test strip, 1 each by Other route 4 (Four) Times a Day., Disp: 150 each, Rfl: 5  •  ONETOUCH DELICA LANCETS FINE misc, 1 each 4 (Four) Times a Day., Disp: 150 each, Rfl: 5  •  RELION INSULIN SYRINGE 1ML/31G 31G X 5/16\" 1 ML misc, USE 1 SYRINGE 4 TIMES DAILY, Disp: 200 each, Rfl: 1  •  tamsulosin (FLOMAX) 0.4 MG capsule 24 hr capsule, Take 1 capsule by mouth Every Night., Disp: 90 capsule, Rfl: 1    Allergies   Allergen Reactions   • Morphine Hives   • Morphine And Related    • Nabumetone Rash       Review of Systems   Constitutional: Positive for fatigue. Negative for chills, fever and unexpected weight change.   HENT: Negative for congestion, ear pain, postnasal drip, sinus pressure, sore throat and trouble swallowing.    Eyes: Negative for blurred vision and visual disturbance.   Respiratory: Negative for cough, chest tightness and wheezing.    Cardiovascular: Negative for chest pain, palpitations and leg swelling.   Gastrointestinal: Negative for abdominal pain, blood in stool, nausea and vomiting. " "  Genitourinary: Negative for dysuria, frequency and urgency.   Musculoskeletal: Positive for arthralgias. Negative for joint swelling.   Skin: Negative for color change.   Neurological: Negative for syncope, weakness and headaches.   Hematological: Does not bruise/bleed easily.   Psychiatric/Behavioral: Positive for sleep disturbance (c/o frequent awakenings).       Objective   Vitals:    03/27/19 0940   BP: 130/82   BP Location: Left arm   Patient Position: Sitting   Cuff Size: Adult   Pulse: 81   SpO2: 98%   Weight: 123 kg (271 lb)   Height: 177.8 cm (70\")     Physical Exam   Constitutional: He appears well-developed and well-nourished. He is cooperative. He does not have a sickly appearance. He does not appear ill.   HENT:   Head: Normocephalic.   Right Ear: Hearing, tympanic membrane and external ear normal.   Left Ear: Hearing, tympanic membrane and external ear normal.   Nose: Nose normal. No mucosal edema, rhinorrhea, sinus tenderness or nasal deformity. Right sinus exhibits no maxillary sinus tenderness and no frontal sinus tenderness. Left sinus exhibits no maxillary sinus tenderness and no frontal sinus tenderness.   Mouth/Throat: Oropharynx is clear and moist and mucous membranes are normal. Normal dentition.   Eyes: Conjunctivae and lids are normal. Right eye exhibits no discharge and no exudate. Left eye exhibits no discharge and no exudate.   Neck: Trachea normal. Carotid bruit is not present. No edema present. No thyroid mass present.   Cardiovascular: Regular rhythm, normal heart sounds and normal pulses.   No murmur heard.  Pulmonary/Chest: Breath sounds normal. No respiratory distress. He has no decreased breath sounds. He has no wheezes. He has no rhonchi. He has no rales.   Abdominal: Soft. There is no tenderness.   Lymphadenopathy:        Head (right side): No submental, no submandibular, no tonsillar, no preauricular, no posterior auricular and no occipital adenopathy present.        Head " (left side): No submental, no submandibular, no tonsillar, no preauricular, no posterior auricular and no occipital adenopathy present.   Neurological: He is alert.   Skin: Skin is warm, dry and intact. No cyanosis. Nails show no clubbing.       Assessment/Plan   Claude was seen today for hypertension and diabetes.    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

## 2019-04-01 ENCOUNTER — OFFICE VISIT (OUTPATIENT)
Dept: INTERNAL MEDICINE | Facility: CLINIC | Age: 79
End: 2019-04-01

## 2019-04-01 VITALS
BODY MASS INDEX: 39.22 KG/M2 | HEART RATE: 72 BPM | TEMPERATURE: 98.1 F | WEIGHT: 274 LBS | OXYGEN SATURATION: 98 % | HEIGHT: 70 IN | DIASTOLIC BLOOD PRESSURE: 80 MMHG | SYSTOLIC BLOOD PRESSURE: 122 MMHG

## 2019-04-01 DIAGNOSIS — J06.9 ACUTE URI: Primary | ICD-10-CM

## 2019-04-01 PROCEDURE — 99213 OFFICE O/P EST LOW 20 MIN: CPT | Performed by: NURSE PRACTITIONER

## 2019-04-01 RX ORDER — GUAIFENESIN 600 MG/1
600 TABLET, EXTENDED RELEASE ORAL EVERY 12 HOURS SCHEDULED
Qty: 14 TABLET
Start: 2019-04-01 | End: 2019-04-08

## 2019-04-01 RX ORDER — AMOXICILLIN 875 MG/1
875 TABLET, COATED ORAL EVERY 12 HOURS SCHEDULED
Qty: 14 TABLET | Refills: 0 | Status: SHIPPED | OUTPATIENT
Start: 2019-04-01 | End: 2019-04-08

## 2019-04-01 RX ORDER — LORATADINE 10 MG/1
10 TABLET ORAL DAILY
Qty: 10 TABLET
Start: 2019-04-01 | End: 2019-04-11

## 2019-04-01 NOTE — PROGRESS NOTES
Subjective   Claude E Johnson is a 78 y.o. male who presents due to respiratory symptoms.    URI    This is a new problem. The current episode started in the past 7 days. The problem has been rapidly worsening. There has been no fever. Associated symptoms include congestion, coughing, headaches, rhinorrhea, sneezing and a sore throat. Pertinent negatives include no abdominal pain, chest pain, diarrhea, dysuria, ear pain, nausea, vomiting or wheezing. Associated symptoms comments: hoarseness. Treatments tried: Mucinex. The treatment provided mild relief.        The following portions of the patient's history were reviewed and updated as appropriate: allergies, current medications, past social history and problem list.    Past Medical History:   Diagnosis Date   • CKD (chronic kidney disease)    • Hyperlipidemia    • Hypertension    • Type 2 diabetes mellitus (CMS/[facility])          Current Outpatient Medications:   •  amLODIPine (NORVASC) 5 MG tablet, Take 1 tablet by mouth Daily. (Patient taking differently: Take 5 mg by mouth 2 (Two) Times a Day.), Disp: 90 tablet, Rfl: 3  •  aspirin 325 MG tablet, Take  by mouth daily., Disp: , Rfl:   •  atorvastatin (LIPITOR) 40 MG tablet, TAKE 1 TABLET DAILY, Disp: 90 tablet, Rfl: 3  •  Blood Glucose Monitoring Suppl (ONE TOUCH ULTRA 2) w/Device kit, 1 each 4 (Four) Times a Day., Disp: 1 each, Rfl: 1  •  fluticasone (FLONASE) 50 MCG/ACT nasal spray, 2 sprays by Each Nare route Daily., Disp: 3 bottle, Rfl: 3  •  furosemide (LASIX) 20 MG tablet, Take 20 mg by mouth Daily., Disp: , Rfl:   •  insulin NPH (NOVOLIN N) 100 UNIT/ML injection, 25 units before breakfast and 25 units before bedtime, Disp: 20 mL, Rfl: 3  •  Insulin Pen Needle (PEN NEEDLES) 29G X 12MM misc, Inject 1 each under the skin 4 (Four) Times a Day. Use with Insulin, Disp: 400 each, Rfl: 1  •  insulin regular (NOVOLIN R) 100 UNIT/ML injection, 10 units before breakfast and 8 units at dinner sc, Disp: 10 mL, Rfl: 3  •   "Insulin Syringe (RELION INSULIN SYRINGE) 29G X 1/2\" 1 ML misc, 4 times daily sc inj, Disp: 200 each, Rfl: 3  •  Multiple Vitamin (MULTI VITAMIN DAILY PO), Take  by mouth., Disp: , Rfl:   •  ONE TOUCH ULTRA TEST test strip, 1 each by Other route 4 (Four) Times a Day., Disp: 150 each, Rfl: 5  •  ONETOUCH DELICA LANCETS FINE misc, 1 each 4 (Four) Times a Day., Disp: 150 each, Rfl: 5  •  RELION INSULIN SYRINGE 1ML/31G 31G X 5/16\" 1 ML misc, USE 1 SYRINGE 4 TIMES DAILY, Disp: 200 each, Rfl: 1  •  tamsulosin (FLOMAX) 0.4 MG capsule 24 hr capsule, Take 1 capsule by mouth Every Night., Disp: 90 capsule, Rfl: 1  •  amoxicillin (AMOXIL) 875 MG tablet, Take 1 tablet by mouth Every 12 (Twelve) Hours for 7 days., Disp: 14 tablet, Rfl: 0  •  guaiFENesin (MUCINEX) 600 MG 12 hr tablet, Take 1 tablet by mouth Every 12 (Twelve) Hours for 7 days., Disp: 14 tablet, Rfl:   •  loratadine (CLARITIN) 10 MG tablet, Take 1 tablet by mouth Daily for 10 days., Disp: 10 tablet, Rfl:     Allergies   Allergen Reactions   • Morphine Hives   • Morphine And Related    • Nabumetone Rash       Review of Systems   Constitutional: Positive for fatigue. Negative for activity change, appetite change, chills, fever and unexpected weight change.   HENT: Positive for congestion, postnasal drip, rhinorrhea, sinus pressure, sneezing and sore throat. Negative for drooling, ear pain, facial swelling, hearing loss, mouth sores, nosebleeds, trouble swallowing and voice change.    Eyes: Negative for pain, discharge, itching and visual disturbance.   Respiratory: Positive for cough. Negative for choking, chest tightness, shortness of breath and wheezing.    Cardiovascular: Negative for chest pain and palpitations.   Gastrointestinal: Negative for abdominal pain, constipation, diarrhea, nausea and vomiting.   Endocrine: Negative for polyuria.   Genitourinary: Negative for dysuria and frequency.   Musculoskeletal: Negative for back pain and joint swelling. " "  Neurological: Positive for headaches.       Objective   Vitals:    04/01/19 0842   BP: 122/80   BP Location: Left arm   Patient Position: Sitting   Cuff Size: Adult   Pulse: 72   Temp: 98.1 °F (36.7 °C)   TempSrc: Oral   SpO2: 98%   Weight: 124 kg (274 lb)   Height: 177.8 cm (70\")     Physical Exam   Constitutional: He appears well-developed and well-nourished. He is cooperative. He does not have a sickly appearance. He does not appear ill.   HENT:   Head: Normocephalic.   Right Ear: Hearing, tympanic membrane and external ear normal. No drainage, swelling or tenderness. Tympanic membrane is not injected, not erythematous and not bulging. No middle ear effusion.   Left Ear: Hearing, tympanic membrane and external ear normal. No drainage, swelling or tenderness. Tympanic membrane is not injected, not erythematous and not bulging.  No middle ear effusion.   Nose: Nose normal. No mucosal edema, rhinorrhea or sinus tenderness. Right sinus exhibits no maxillary sinus tenderness and no frontal sinus tenderness. Left sinus exhibits no maxillary sinus tenderness and no frontal sinus tenderness.   Mouth/Throat: Mucous membranes are normal. Posterior oropharyngeal erythema present.   Eyes: Conjunctivae and lids are normal. Right eye exhibits no discharge and no exudate. Left eye exhibits no discharge and no exudate.   Neck: Trachea normal and normal range of motion. Edema present.   Cardiovascular: Regular rhythm, normal heart sounds and normal pulses.   No murmur heard.  Pulmonary/Chest: Breath sounds normal. No respiratory distress. He has no decreased breath sounds. He has no wheezes. He has no rhonchi. He has no rales.   Lymphadenopathy:     He has no cervical adenopathy.   Neurological: He is alert.   Skin: Skin is warm, dry and intact.   Nursing note and vitals reviewed.      Assessment/Plan   Claude was seen today for uri.    Diagnoses and all orders for this visit:    Acute URI  -     amoxicillin (AMOXIL) 875 MG " tablet; Take 1 tablet by mouth Every 12 (Twelve) Hours for 7 days.  -     guaiFENesin (MUCINEX) 600 MG 12 hr tablet; Take 1 tablet by mouth Every 12 (Twelve) Hours for 7 days.  -     loratadine (CLARITIN) 10 MG tablet; Take 1 tablet by mouth Daily for 10 days.

## 2019-04-23 DIAGNOSIS — N40.1 BENIGN PROSTATIC HYPERPLASIA WITH URINARY FREQUENCY: ICD-10-CM

## 2019-04-23 DIAGNOSIS — R35.0 BENIGN PROSTATIC HYPERPLASIA WITH URINARY FREQUENCY: ICD-10-CM

## 2019-04-23 RX ORDER — TAMSULOSIN HYDROCHLORIDE 0.4 MG/1
CAPSULE ORAL
Qty: 90 CAPSULE | Refills: 1 | Status: SHIPPED | OUTPATIENT
Start: 2019-04-23 | End: 2019-07-02

## 2019-05-24 ENCOUNTER — TELEPHONE (OUTPATIENT)
Dept: ENDOCRINOLOGY | Age: 79
End: 2019-05-24

## 2019-05-24 NOTE — TELEPHONE ENCOUNTER
LEFT VOICE MAIL FOR PATIENT TO LET HIM KNOW THAT HE NEEDS TO CALL Tuesday  TO SET UP AN LAB APPT TO GET HIS A1C DONE         CALLED AND SPOKE WITH PATIENT A1C RESULTS        ----- Message from Keri Plaza MA sent at 5/24/2019  3:32 PM EDT -----  Contact: Jenna       ----- Message -----  From: Jenna Benavides  Sent: 5/21/2019   8:48 AM  To: Keri Plaza MA    I called patient and rescheduled bumped appt today to 1st available 7-2-19. He said he is trying to have shoulder surgery and needs to get labs drawn for A1C right away  and it needs to be 8 to 8.2 before he can have the surgery.  He is having shoulder pain and needs A1C results.     Pt asking for call back. - [phone]

## 2019-05-28 ENCOUNTER — PROCEDURE VISIT CONVERTED (OUTPATIENT)
Dept: PODIATRY | Facility: CLINIC | Age: 79
End: 2019-05-28
Attending: PODIATRIST

## 2019-05-28 ENCOUNTER — CONVERSION ENCOUNTER (OUTPATIENT)
Dept: PODIATRY | Facility: CLINIC | Age: 79
End: 2019-05-28

## 2019-06-11 ENCOUNTER — RESULTS ENCOUNTER (OUTPATIENT)
Dept: ENDOCRINOLOGY | Age: 79
End: 2019-06-11

## 2019-06-11 DIAGNOSIS — IMO0002 UNCONTROLLED TYPE 2 DIABETES MELLITUS WITH COMPLICATION, WITH LONG-TERM CURRENT USE OF INSULIN: Primary | ICD-10-CM

## 2019-06-11 DIAGNOSIS — IMO0002 UNCONTROLLED TYPE 2 DIABETES MELLITUS WITH COMPLICATION, WITH LONG-TERM CURRENT USE OF INSULIN: ICD-10-CM

## 2019-06-18 LAB
ALBUMIN SERPL-MCNC: 4.7 G/DL (ref 3.5–5.2)
ALBUMIN/GLOB SERPL: 2.9 G/DL
ALP SERPL-CCNC: 101 U/L (ref 39–117)
ALT SERPL-CCNC: 15 U/L (ref 1–41)
AST SERPL-CCNC: 9 U/L (ref 1–40)
BILIRUB SERPL-MCNC: 0.6 MG/DL (ref 0.2–1.2)
BUN SERPL-MCNC: 27 MG/DL (ref 8–23)
BUN/CREAT SERPL: 21.3 (ref 7–25)
CALCIUM SERPL-MCNC: 9.1 MG/DL (ref 8.6–10.5)
CHLORIDE SERPL-SCNC: 105 MMOL/L (ref 98–107)
CO2 SERPL-SCNC: 25.1 MMOL/L (ref 22–29)
CREAT SERPL-MCNC: 1.27 MG/DL (ref 0.76–1.27)
GLOBULIN SER CALC-MCNC: 1.6 GM/DL
GLUCOSE SERPL-MCNC: 174 MG/DL (ref 65–99)
HBA1C MFR BLD: 8.5 % (ref 4.8–5.6)
POTASSIUM SERPL-SCNC: 4.6 MMOL/L (ref 3.5–5.2)
PROT SERPL-MCNC: 6.3 G/DL (ref 6–8.5)
SODIUM SERPL-SCNC: 140 MMOL/L (ref 136–145)
UNABLE TO VOID: NORMAL

## 2019-07-02 ENCOUNTER — OFFICE VISIT (OUTPATIENT)
Dept: ENDOCRINOLOGY | Age: 79
End: 2019-07-02

## 2019-07-02 VITALS
WEIGHT: 272 LBS | HEART RATE: 79 BPM | DIASTOLIC BLOOD PRESSURE: 74 MMHG | HEIGHT: 70 IN | SYSTOLIC BLOOD PRESSURE: 118 MMHG | BODY MASS INDEX: 38.94 KG/M2

## 2019-07-02 DIAGNOSIS — IMO0002 UNCONTROLLED TYPE 2 DIABETES MELLITUS WITH COMPLICATION, WITH LONG-TERM CURRENT USE OF INSULIN: Primary | ICD-10-CM

## 2019-07-02 DIAGNOSIS — IMO0002 UNCONTROLLED TYPE II DIABETES MELLITUS WITH NEPHROPATHY: ICD-10-CM

## 2019-07-02 DIAGNOSIS — E16.1 HYPERINSULINISM: ICD-10-CM

## 2019-07-02 DIAGNOSIS — Z79.4 ENCOUNTER FOR LONG-TERM (CURRENT) USE OF INSULIN (HCC): ICD-10-CM

## 2019-07-02 DIAGNOSIS — R63.5 ABNORMAL WEIGHT GAIN: ICD-10-CM

## 2019-07-02 DIAGNOSIS — N18.30 CKD STAGE 3 DUE TO TYPE 2 DIABETES MELLITUS (HCC): ICD-10-CM

## 2019-07-02 DIAGNOSIS — E11.22 CKD STAGE 3 DUE TO TYPE 2 DIABETES MELLITUS (HCC): ICD-10-CM

## 2019-07-02 DIAGNOSIS — IMO0002 UNCONTROLLED TYPE 2 DIABETES MELLITUS WITH DIABETIC NEUROPATHY, WITH LONG-TERM CURRENT USE OF INSULIN: ICD-10-CM

## 2019-07-02 PROCEDURE — 99214 OFFICE O/P EST MOD 30 MIN: CPT | Performed by: INTERNAL MEDICINE

## 2019-07-02 RX ORDER — TRAMADOL HYDROCHLORIDE 50 MG/1
TABLET ORAL
Refills: 0 | COMMUNITY
Start: 2019-04-14 | End: 2019-07-31

## 2019-07-02 RX ORDER — HYDRALAZINE HYDROCHLORIDE 25 MG/1
25 TABLET, FILM COATED ORAL 3 TIMES DAILY
Refills: 2 | COMMUNITY
Start: 2019-04-11

## 2019-07-02 NOTE — PROGRESS NOTES
78 y.o.    Patient Care Team:  Shanice Cummings APRN as PCP - General (Internal Medicine)  Jordy Rene MD as PCP - Claims Attributed  [facility] as Consulting Physician (Cardiology)  Jordy Rene MD as Consulting Physician (Nephrology)    Chief Complaint:      F/U TYPE 2 DIABETES, UNCONTROLLED.  HERE TO DISCUSS LAB RESULTS.  Subjective     HPI   Patient is a 78-year-old white male with a past history of uncontrolled type 2 diabetes mellitus came for follow-up  He is a former patient of Dr. Mendosa    Uncontrolled type 2 diabetes mellitus  Patient is currently on NPH regular insulin twice daily  He reports that most of his blood sugars are in the 100-250 range  He is currently taking NPH 25 units before breakfast and bedtime and regular insulin 10 units before brunch and 8 before dinner  Hypoglycemia  Patient denies any recent episodes of hypoglycemia  Uncontrolled type 2 diabetes mellitus and neuropathy  Patient reports symptoms of tingling numbness and burning in both feet  Uncontrolled type 2 diabetes mellitus nephropathy  Patient has elevated creatinine with chronic kidney disease  He has regular nephrology follow-up  Patient denies any knowledge of diabetic retinopathy or peripheral vascular disease  Abnormal weight gain  Patient currently weighs 272 pounds with a BMI of 40      Interval History    Summary  Patient was diagnosed with uncontrolled type 2 diabetes mellitus in 1997 and has been on medication ever since.      He currently takes Lantus 50 units at night. No other medication for diabetes. He used to be on Byetta and Humalog but apparently due to insurance issues he discontinued both of them. He has been focused on diet and activity and exercise and is managing to keep his blood sugars below 120. For the past few months.. He recalls his last hemoglobin A1c to be 8.6% prior to above changes      Uncontrolled type 2 diabetes mellitus with  neuropathy  Patient reports symptoms of tingling and numbness especially in the toes and the big toe. He has no sores or lesions on the feet      Uncontrolled type 2 diabetes mellitus with nephropathy and chronic kidney disease.  Patient has regular follow-up with a nephrologist      Patient denied any knowledge of diabetic retinopathy.  Patient had occasional episodes of hypoglycemia with a blood sugar dropping and to 70s and he becomes symptomatic  He started walking 2 miles every other day and has been controlling his diet very strictly.      Abnormal weight gain  Patient is also gained significant weight over the past several years. He currently weighs 274 pounds and is actively planning to lose weight           The following portions of the patient's history were reviewed and updated as appropriate: allergies, current medications, past family history, past medical history, past social history, past surgical history and problem list.    Past Medical History:   Diagnosis Date   • CKD (chronic kidney disease)    • Hyperlipidemia    • Hypertension    • Type 2 diabetes mellitus (CMS/[facility])      Family History   Problem Relation Age of Onset   • Cancer Mother    • Heart disease Father      Social History     Socioeconomic History   • Marital status:      Spouse name: Not on file   • Number of children: Not on file   • Years of education: Not on file   • Highest education level: Not on file   Tobacco Use   • Smoking status: Former Smoker   • Smokeless tobacco: Never Used   Substance and Sexual Activity   • Alcohol use: No   • Drug use: No     Allergies   Allergen Reactions   • Morphine Hives   • Morphine And Related    • Nabumetone Rash       Current Outpatient Medications:   •  amLODIPine (NORVASC) 5 MG tablet, Take 1 tablet by mouth Daily. (Patient taking differently: Take 5 mg by mouth 2 (Two) Times a Day.), Disp: 90 tablet, Rfl: 3  •  aspirin 325 MG tablet, Take  by mouth daily., Disp: , Rfl:   •   "atorvastatin (LIPITOR) 40 MG tablet, TAKE 1 TABLET DAILY, Disp: 90 tablet, Rfl: 3  •  Blood Glucose Monitoring Suppl (ONE TOUCH ULTRA 2) w/Device kit, 1 each 4 (Four) Times a Day., Disp: 1 each, Rfl: 1  •  fluticasone (FLONASE) 50 MCG/ACT nasal spray, 2 sprays by Each Nare route Daily., Disp: 3 bottle, Rfl: 3  •  furosemide (LASIX) 20 MG tablet, Take 20 mg by mouth Daily., Disp: , Rfl:   •  insulin NPH (NOVOLIN N RELION) 100 UNIT/ML injection, INJECT 25 UNITS SUBCUTANEOUSLY BEFORE BREAKFAST AND 25 BEFORE  BEDTIME, Disp: 50 mL, Rfl: 3  •  Insulin Pen Needle (PEN NEEDLES) 29G X 12MM misc, Inject 1 each under the skin 4 (Four) Times a Day. Use with Insulin, Disp: 400 each, Rfl: 1  •  insulin regular (NOVOLIN R RELION) 100 UNIT/ML injection, INJECT 10 UNITS SUBCUTANEOUSLY BEFORE BREAKFAST AND 8 AT DINNER, Disp: 10 mL, Rfl: 3  •  Insulin Syringe (RELION INSULIN SYRINGE) 29G X 1/2\" 1 ML misc, 4 times daily sc inj, Disp: 200 each, Rfl: 3  •  Multiple Vitamin (MULTI VITAMIN DAILY PO), Take  by mouth., Disp: , Rfl:   •  ONE TOUCH ULTRA TEST test strip, 1 each by Other route 4 (Four) Times a Day., Disp: 150 each, Rfl: 5  •  ONETOUCH DELICA LANCETS FINE misc, 1 each 4 (Four) Times a Day., Disp: 150 each, Rfl: 5  •  RELION INSULIN SYRINGE 1ML/31G 31G X 5/16\" 1 ML misc, USE 1 SYRINGE 4 TIMES DAILY, Disp: 200 each, Rfl: 1  •  tamsulosin (FLOMAX) 0.4 MG capsule 24 hr capsule, Take 1 capsule by mouth Every Night., Disp: 90 capsule, Rfl: 1  •  tamsulosin (FLOMAX) 0.4 MG capsule 24 hr capsule, TAKE 1 CAPSULE EVERY NIGHT, Disp: 90 capsule, Rfl: 1        Review of Systems   Constitutional: Negative for chills, fatigue and fever.   Cardiovascular: Negative for chest pain and palpitations.   Gastrointestinal: Negative for abdominal pain, constipation, diarrhea, nausea and vomiting.   Endocrine: Negative for cold intolerance and heat intolerance.   All other systems reviewed and are negative.      Objective       Vitals:    07/02/19 1417 " "  BP: 118/74   Pulse: 79   Weight: 123 kg (272 lb)   Height: 177.8 cm (70\")     Body mass index is 39.03 kg/m².      Physical Exam   Constitutional: He is oriented to person, place, and time. He appears well-developed and well-nourished.   HENT:   Head: Normocephalic and atraumatic.   Eyes: EOM are normal. Pupils are equal, round, and reactive to light.   Neck: Normal range of motion. Neck supple. No thyromegaly present.   Cardiovascular: Normal rate, regular rhythm, normal heart sounds and intact distal pulses.   Pulmonary/Chest: Effort normal and breath sounds normal.   Abdominal: Soft. Bowel sounds are normal. He exhibits distension. There is no tenderness.   Lymphadenopathy:     He has no cervical adenopathy.   Neurological: He is alert and oriented to person, place, and time. He has normal reflexes.   Skin: Skin is warm and dry.   Psychiatric: He has a normal mood and affect. His behavior is normal.   Nursing note and vitals reviewed.    Results Review:     I reviewed the patient's new clinical results.    Medical records reviewed  Summary:      Results Encounter on 06/11/2019   Component Date Value Ref Range Status   • Glucose 06/18/2019 174* 65 - 99 mg/dL Final   • BUN 06/18/2019 27* 8 - 23 mg/dL Final   • Creatinine 06/18/2019 1.27  0.76 - 1.27 mg/dL Final   • eGFR Non African Am 06/18/2019 55* >60 mL/min/1.73 Final    Comment: The MDRD GFR formula is only valid for adults with stable  renal function between ages 18 and 70.     • eGFR  Am 06/18/2019 66  >60 mL/min/1.73 Final   • BUN/Creatinine Ratio 06/18/2019 21.3  7.0 - 25.0 Final   • Sodium 06/18/2019 140  136 - 145 mmol/L Final   • Potassium 06/18/2019 4.6  3.5 - 5.2 mmol/L Final   • Chloride 06/18/2019 105  98 - 107 mmol/L Final   • Total CO2 06/18/2019 25.1  22.0 - 29.0 mmol/L Final   • Calcium 06/18/2019 9.1  8.6 - 10.5 mg/dL Final   • Total Protein 06/18/2019 6.3  6.0 - 8.5 g/dL Final   • Albumin 06/18/2019 4.70  3.50 - 5.20 g/dL Final   • " Globulin 06/18/2019 1.6  gm/dL Final   • A/G Ratio 06/18/2019 2.9  g/dL Final   • Total Bilirubin 06/18/2019 0.6  0.2 - 1.2 mg/dL Final   • Alkaline Phosphatase 06/18/2019 101  39 - 117 U/L Final   • AST (SGOT) 06/18/2019 9  1 - 40 U/L Final   • ALT (SGPT) 06/18/2019 15  1 - 41 U/L Final   • Hemoglobin A1C 06/18/2019 8.50* 4.80 - 5.60 % Final    Comment: Hemoglobin A1C Ranges:  Increased Risk for Diabetes  5.7% to 6.4%  Diabetes                     >= 6.5%  Diabetic Goal                < 7.0%     • Unable to Void 06/18/2019 Comment   Final    Comment: The patient was not able to render a urine sample and has been  instructed to return for a urine collection at their earliest  convenience.  The urine testing that you have requested has  been deleted from this report.  When the patient returns and  provides a urine specimen, the urine testing will be performed  and separately reported.       Lab Results   Component Value Date    HGBA1C 8.50 (H) 06/18/2019    HGBA1C 9.7 (H) 02/19/2019    HGBA1C 9.23 (H) 11/13/2018     Lab Results   Component Value Date    MICROALBUR 77.9 07/13/2018    CREATININE 1.27 06/18/2019     Imaging Results (most recent)     None          Assessment and Plan:    Claude was seen today for diabetes.    Diagnoses and all orders for this visit:    Uncontrolled type 2 diabetes mellitus with complication, with long-term current use of insulin (CMS/[facility])    Uncontrolled type II diabetes mellitus with nephropathy (CMS/[facility])    Uncontrolled type 2 diabetes mellitus with diabetic neuropathy, with long-term current use of insulin (CMS/[facility])    CKD stage 3 due to type 2 diabetes mellitus (CMS/[facility])    Hyperinsulinism    Encounter for long-term (current) use of insulin (CMS/[facility])    Abnormal weight gain        Patient's glucose log reviewed  Majority are in the 100-200 range  Patient reports that he stopped drinking wine on a daily basis and his blood sugar started getting better  His hemoglobin A1c came down to  "8.5% much better than 9.7% previously    I advised the patient to increase NPH insulin 28 units in the morning and 20 5 in the evening at bedtime  I advised him to increase regular insulin 12 units at breakfast and 8 units at dinner    Patient's hemoglobin A1c goal should be below 8%  Patient verbalized understanding    Patient return to follow-up in 3 months.    The total time spent  was more than 25 min of which greater than 15 min of time (greater than 50% of the total time)  was spent face to face with the patient counseling and coordination of care on recommended evaluation and treatment options, instructions for management/treatment and /or follow up and importance of compliance with chosen management or treatment options  Layton Jolly MD. FACE    07/02/19      EMR Dragon / transcription disclaimer:     \"Dictated utilizing Dragon dictation\".         "

## 2019-07-31 ENCOUNTER — OFFICE VISIT (OUTPATIENT)
Dept: INTERNAL MEDICINE | Facility: CLINIC | Age: 79
End: 2019-07-31

## 2019-07-31 VITALS
WEIGHT: 266 LBS | BODY MASS INDEX: 38.08 KG/M2 | SYSTOLIC BLOOD PRESSURE: 126 MMHG | HEIGHT: 70 IN | DIASTOLIC BLOOD PRESSURE: 78 MMHG

## 2019-07-31 DIAGNOSIS — E78.00 HYPERCHOLESTEROLEMIA: Primary | ICD-10-CM

## 2019-07-31 DIAGNOSIS — IMO0002 UNCONTROLLED TYPE II DIABETES MELLITUS WITH NEPHROPATHY: ICD-10-CM

## 2019-07-31 DIAGNOSIS — E11.22 CKD STAGE 2 DUE TO TYPE 2 DIABETES MELLITUS (HCC): ICD-10-CM

## 2019-07-31 DIAGNOSIS — I10 HYPERTENSION, BENIGN: ICD-10-CM

## 2019-07-31 DIAGNOSIS — N18.2 CKD STAGE 2 DUE TO TYPE 2 DIABETES MELLITUS (HCC): ICD-10-CM

## 2019-07-31 PROCEDURE — 99214 OFFICE O/P EST MOD 30 MIN: CPT | Performed by: NURSE PRACTITIONER

## 2019-07-31 RX ORDER — AMLODIPINE BESYLATE 10 MG/1
TABLET ORAL
Qty: 30 TABLET | Refills: 5
Start: 2019-07-31

## 2019-07-31 NOTE — PROGRESS NOTES
Subjective   Claude E Johnson is a 78 y.o. male who presents for f/u regarding DM2, HTN and hyperlipidemia.    He underwent left shoulder replacement 7/16 by Dr. Gleason and is doing well, tolerating outpatient PT. He is not currently taking any medications for pain. He slept well last for the first time since surgery.  He has a longstanding history of DM2, last A1c (6/18/19) was 8.50.  He is followed by nephrology for mgmt of CKD, recently started Hydralazine which has been helpful in controlling BP.  He continue to c/o nocturia, reports moderate improvement with Flomax.        Hypertension   This is a chronic problem. The current episode started more than 1 year ago. The problem is unchanged. The problem is controlled. Pertinent negatives include no chest pain, headaches or palpitations. Current antihypertension treatment includes calcium channel blockers and alpha 1 blockers. The current treatment provides significant improvement. There are no compliance problems.    Hyperlipidemia   This is a chronic problem. The current episode started more than 1 year ago. The problem is controlled. Recent lipid tests were reviewed and are low. Pertinent negatives include no chest pain. Current antihyperlipidemic treatment includes statins. The current treatment provides significant improvement of lipids. There are no compliance problems (denies myalgias with Atorvastatin).         The following portions of the patient's history were reviewed and updated as appropriate: allergies, current medications, past social history and problem list.    Past Medical History:   Diagnosis Date   • CKD (chronic kidney disease)    • Hyperlipidemia    • Hypertension    • Type 2 diabetes mellitus (CMS/[facility])          Current Outpatient Medications:   •  amLODIPine (NORVASC) 10 MG tablet, 1/2 tablet twice daily, Disp: 30 tablet, Rfl: 5  •  aspirin 325 MG tablet, Take  by mouth daily., Disp: , Rfl:   •  atorvastatin (LIPITOR) 40 MG tablet, TAKE 1  "TABLET DAILY, Disp: 90 tablet, Rfl: 3  •  Blood Glucose Monitoring Suppl (ONE TOUCH ULTRA 2) w/Device kit, 1 each 4 (Four) Times a Day., Disp: 1 each, Rfl: 1  •  fluticasone (FLONASE) 50 MCG/ACT nasal spray, 2 sprays by Each Nare route Daily., Disp: 3 bottle, Rfl: 3  •  furosemide (LASIX) 20 MG tablet, Take 20 mg by mouth Daily., Disp: , Rfl:   •  hydrALAZINE (APRESOLINE) 25 MG tablet, Take 25 mg by mouth Daily., Disp: , Rfl: 2  •  hydrocortisone 2.5 % cream, , Disp: , Rfl:   •  insulin NPH (NOVOLIN N RELION) 100 UNIT/ML injection, INJECT 25 UNITS SUBCUTANEOUSLY BEFORE BREAKFAST AND 25 BEFORE  BEDTIME, Disp: 50 mL, Rfl: 3  •  Insulin Pen Needle (PEN NEEDLES) 29G X 12MM misc, Inject 1 each under the skin 4 (Four) Times a Day. Use with Insulin, Disp: 400 each, Rfl: 1  •  insulin regular (NOVOLIN R RELION) 100 UNIT/ML injection, INJECT 10 UNITS SUBCUTANEOUSLY BEFORE BREAKFAST AND 8 AT DINNER, Disp: 10 mL, Rfl: 3  •  Insulin Syringe (RELION INSULIN SYRINGE) 29G X 1/2\" 1 ML misc, 4 times daily sc inj, Disp: 200 each, Rfl: 3  •  Multiple Vitamin (MULTI VITAMIN DAILY PO), Take  by mouth., Disp: , Rfl:   •  ONE TOUCH ULTRA TEST test strip, 1 each by Other route 4 (Four) Times a Day., Disp: 150 each, Rfl: 5  •  ONETOUCH DELICA LANCETS FINE misc, 1 each 4 (Four) Times a Day., Disp: 150 each, Rfl: 5  •  RELION INSULIN SYRINGE 1ML/31G 31G X 5/16\" 1 ML misc, USE 1 SYRINGE 4 TIMES DAILY, Disp: 200 each, Rfl: 1  •  tamsulosin (FLOMAX) 0.4 MG capsule 24 hr capsule, Take 1 capsule by mouth Every Night., Disp: 90 capsule, Rfl: 1    Allergies   Allergen Reactions   • Morphine Hives   • Morphine And Related    • Nabumetone Rash       Review of Systems   Constitutional: Negative for chills, fatigue, fever and unexpected weight change.   HENT: Negative for congestion, ear pain, postnasal drip, sinus pressure, sore throat and trouble swallowing.    Eyes: Negative for visual disturbance.   Respiratory: Negative for cough, chest tightness " "and wheezing.    Cardiovascular: Negative for chest pain, palpitations and leg swelling.   Gastrointestinal: Negative for abdominal pain, blood in stool, nausea and vomiting.   Genitourinary: Positive for frequency and urgency. Negative for dysuria.   Musculoskeletal: Positive for arthralgias. Negative for joint swelling.   Skin: Negative for color change.   Neurological: Negative for syncope, weakness and headaches.   Hematological: Does not bruise/bleed easily.       Objective   Vitals:    07/31/19 0854   BP: 126/78   BP Location: Right arm   Patient Position: Sitting   Cuff Size: Large Adult   Weight: 121 kg (266 lb)   Height: 177.8 cm (70\")     Physical Exam   Constitutional: He appears well-developed and well-nourished. He is cooperative. He does not have a sickly appearance. He does not appear ill.   HENT:   Head: Normocephalic.   Right Ear: Hearing, tympanic membrane and external ear normal.   Left Ear: Hearing, tympanic membrane and external ear normal.   Nose: Nose normal. No mucosal edema, rhinorrhea, sinus tenderness or nasal deformity. Right sinus exhibits no maxillary sinus tenderness and no frontal sinus tenderness. Left sinus exhibits no maxillary sinus tenderness and no frontal sinus tenderness.   Mouth/Throat: Oropharynx is clear and moist and mucous membranes are normal. Normal dentition.   Eyes: Conjunctivae and lids are normal. Right eye exhibits no discharge and no exudate. Left eye exhibits no discharge and no exudate.   Neck: Trachea normal. Carotid bruit is not present. No edema present. No thyroid mass present.   Cardiovascular: Regular rhythm, normal heart sounds and normal pulses.   No murmur heard.  Pulmonary/Chest: Breath sounds normal. No respiratory distress. He has no decreased breath sounds. He has no wheezes. He has no rhonchi. He has no rales.   Musculoskeletal:   Left arm is in splint   Lymphadenopathy:        Head (right side): No submental, no submandibular, no tonsillar, no " preauricular, no posterior auricular and no occipital adenopathy present.        Head (left side): No submental, no submandibular, no tonsillar, no preauricular, no posterior auricular and no occipital adenopathy present.   Neurological: He is alert.   Skin: Skin is warm, dry and intact. No cyanosis. Nails show no clubbing.       Assessment/Plan   Claude was seen today for hypertension and diabetes.    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 Myers which he will schedule once recovered from shoulder surgery.  Reviewed labs from 6/2019, discussed goal of glucose control. He will continue current medications and f/u with endo in October for repeat labs.

## 2019-08-02 ENCOUNTER — PROCEDURE VISIT CONVERTED (OUTPATIENT)
Dept: PODIATRY | Facility: CLINIC | Age: 79
End: 2019-08-02
Attending: PODIATRIST

## 2019-08-02 ENCOUNTER — CONVERSION ENCOUNTER (OUTPATIENT)
Dept: PODIATRY | Facility: CLINIC | Age: 79
End: 2019-08-02

## 2019-09-06 RX ORDER — LANCETS
EACH MISCELLANEOUS
Qty: 200 EACH | Refills: 6 | Status: SHIPPED | OUTPATIENT
Start: 2019-09-06 | End: 2019-09-10 | Stop reason: SDUPTHER

## 2019-09-06 RX ORDER — BLOOD-GLUCOSE METER
EACH MISCELLANEOUS
Qty: 1 KIT | Refills: 0 | Status: SHIPPED | OUTPATIENT
Start: 2019-09-06 | End: 2019-09-10 | Stop reason: SDUPTHER

## 2019-09-06 NOTE — TELEPHONE ENCOUNTER
SPOKE WITH PATIENT ABOUT GLUCOSE METER  PATIENT STATED THAT MEDICARE WILL NOT COVER HIS ONE TOUCH TEST STRIP INFORMED PATIENT I WILL SEND IN ANOTHER GLUCOSE METER WITH TEST STRIPS AND LANCETS PATIENT VOICE UNDERSTANDING            ----- Message from Jenna Benavides sent at 9/6/2019  3:35 PM EDT -----  Contact: patient  Patient said he has about 12 One Touch lancets left and he uses 3 to 4 a day. He said medicare would not approve the One Touch lancets and Medicare told him to check with [employer] on what brand of lancets Medicare would approve. He said he checked with [employer] [phone] and they will not tell him what brands Medicare will cover and said  needed doctors approval.     He asked if he could see Dr. Petersen on Monday but I told him that Dr. Petersen does not have a sooner appointment at this time  than what he is scheduled on 10-3-19.     Pt - [phone] asking for call back.

## 2019-09-10 ENCOUNTER — TELEPHONE (OUTPATIENT)
Dept: ENDOCRINOLOGY | Age: 79
End: 2019-09-10

## 2019-09-10 RX ORDER — BLOOD-GLUCOSE METER
EACH MISCELLANEOUS
Qty: 1 KIT | Refills: 0 | Status: SHIPPED | OUTPATIENT
Start: 2019-09-10

## 2019-09-10 RX ORDER — LANCETS
EACH MISCELLANEOUS
Qty: 200 EACH | Refills: 6 | Status: SHIPPED | OUTPATIENT
Start: 2019-09-10

## 2019-09-10 NOTE — TELEPHONE ENCOUNTER
Patient said he went to [facility] [phone]  today and the only script that he could  was his meter. He said he did not  because he said [facility] told him that it has to be specified by the doctor before he can   lancet device and the  lancets.     He said Medicare will pay for Accu-Chek meter, test strips, lancet device and lancets.  He said he test 3 to 4 xday and he gets 150 test strips and he gets 1 box of lancets     I told him I can see scripts were sent on 9-6-19  but they told him they only received scripts for the meter and test strips.

## 2019-09-13 ENCOUNTER — TELEPHONE (OUTPATIENT)
Dept: ENDOCRINOLOGY | Age: 79
End: 2019-09-13

## 2019-09-13 RX ORDER — LANCETS
1 EACH MISCELLANEOUS 4 TIMES DAILY
Qty: 400 EACH | Refills: 3 | Status: SHIPPED | OUTPATIENT
Start: 2019-09-13

## 2019-09-13 NOTE — TELEPHONE ENCOUNTER
Needs new prescription sent to [facility] for lancets accu chex soft clix    90 day supply    Has to say accu chex soft clix for medicare to pay

## 2019-10-03 ENCOUNTER — OFFICE VISIT (OUTPATIENT)
Dept: ENDOCRINOLOGY | Age: 79
End: 2019-10-03

## 2019-10-03 VITALS
HEIGHT: 70 IN | HEART RATE: 67 BPM | DIASTOLIC BLOOD PRESSURE: 80 MMHG | SYSTOLIC BLOOD PRESSURE: 120 MMHG | BODY MASS INDEX: 39.37 KG/M2 | WEIGHT: 275 LBS

## 2019-10-03 DIAGNOSIS — IMO0002 UNCONTROLLED TYPE 2 DIABETES MELLITUS WITH DIABETIC NEUROPATHY, WITH LONG-TERM CURRENT USE OF INSULIN: ICD-10-CM

## 2019-10-03 DIAGNOSIS — IMO0002 UNCONTROLLED TYPE II DIABETES MELLITUS WITH NEPHROPATHY: ICD-10-CM

## 2019-10-03 DIAGNOSIS — E11.22 CKD STAGE 3 DUE TO TYPE 2 DIABETES MELLITUS (HCC): ICD-10-CM

## 2019-10-03 DIAGNOSIS — N18.30 CKD STAGE 3 DUE TO TYPE 2 DIABETES MELLITUS (HCC): ICD-10-CM

## 2019-10-03 DIAGNOSIS — R63.5 ABNORMAL WEIGHT GAIN: ICD-10-CM

## 2019-10-03 DIAGNOSIS — IMO0002 UNCONTROLLED TYPE 2 DIABETES MELLITUS WITH COMPLICATION, WITH LONG-TERM CURRENT USE OF INSULIN: Primary | ICD-10-CM

## 2019-10-03 DIAGNOSIS — Z79.4 ENCOUNTER FOR LONG-TERM (CURRENT) USE OF INSULIN (HCC): ICD-10-CM

## 2019-10-03 DIAGNOSIS — E16.1 HYPERINSULINISM: ICD-10-CM

## 2019-10-03 PROCEDURE — 99214 OFFICE O/P EST MOD 30 MIN: CPT | Performed by: INTERNAL MEDICINE

## 2019-10-03 RX ORDER — INFLUENZA A VIRUS A/MICHIGAN/45/2015 X-275 (H1N1) ANTIGEN (FORMALDEHYDE INACTIVATED), INFLUENZA A VIRUS A/SINGAPORE/INFIMH-16-0019/2016 IVR-186 (H3N2) ANTIGEN (FORMALDEHYDE INACTIVATED), AND INFLUENZA B VIRUS B/MARYLAND/15/2016 BX-69A (A B/COLORADO/6/2017-LIKE VIRUS) ANTIGEN (FORMALDEHYDE INACTIVATED) 60; 60; 60 UG/.5ML; UG/.5ML; UG/.5ML
INJECTION, SUSPENSION INTRAMUSCULAR
Refills: 0 | COMMUNITY
Start: 2019-09-11 | End: 2019-11-18

## 2019-10-03 NOTE — PROGRESS NOTES
79 y.o.    Patient Care Team:  Shanice Cummings APRN as PCP - General (Internal Medicine)  Shanice Cummings APRN as PCP - Claims Attributed  [facility]ing Physician (Cardiology)  Jordy Rene MD as Consulting Physician (Nephrology)    Chief Complaint:      F/U TYPE 2 DIABETES, UNCONTROLLED.  NO RECENT LABS.     Subjective     HPI   Patient is a 79-year-old white male with a past history of uncontrolled type 2 diabetes mellitus with complications came for follow-up    Uncontrolled type 2 diabetes mellitus  Patient is currently taking NPH and regular insulin twice daily  He takes NPH 28 units before breakfast and 25 units before bedtime  He takes regular insulin 12 units before breakfast and 8 units before dinner  He reports that majority of his blood sugars are in the 100-200 range  Hypoglycemia  Patient has occasional hypoglycemia with the blood sugar dropping below 100 but not frequently  Uncontrolled type 2 diabetes mellitus with neuropathy  Patient reports symptoms of tingling numbness and burning in both feet  Uncontrolled type 2 diabetes mellitus nephropathy  Patient had elevated creatinine with chronic kidney disease and has regular follow-up with the nephrologist  Patient denies any knowledge of retinopathy  Abnormal weight gain  Patient currently weighs 275 pounds      Interval History    Summary  Patient was diagnosed with uncontrolled type 2 diabetes mellitus in 1997 and has been on medication ever since.      He currently takes Lantus 50 units at night. No other medication for diabetes. He used to be on Byetta and Humalog but apparently due to insurance issues he discontinued both of them. He has been focused on diet and activity and exercise and is managing to keep his blood sugars below 120. For the past few months.. He recalls his last hemoglobin A1c to be 8.6% prior to above changes      Uncontrolled type 2 diabetes mellitus with neuropathy  Patient reports  symptoms of tingling and numbness especially in the toes and the big toe. He has no sores or lesions on the feet      Uncontrolled type 2 diabetes mellitus with nephropathy and chronic kidney disease.  Patient has regular follow-up with a nephrologist      Patient denied any knowledge of diabetic retinopathy.  Patient had occasional episodes of hypoglycemia with a blood sugar dropping and to 70s and he becomes symptomatic  He started walking 2 miles every other day and has been controlling his diet very strictly.      Abnormal weight gain  Patient is also gained significant weight over the past several years. He currently weighs 274 pounds and is actively planning to lose weight  The following portions of the patient's history were reviewed and updated as appropriate: allergies, current medications, past family history, past medical history, past social history, past surgical history and problem list.    Past Medical History:   Diagnosis Date   • CKD (chronic kidney disease)    • Hyperlipidemia    • Hypertension    • Type 2 diabetes mellitus (CMS/[facility])      Family History   Problem Relation Age of Onset   • Cancer Mother    • Heart disease Father      Social History     Socioeconomic History   • Marital status:      Spouse name: Not on file   • Number of children: Not on file   • Years of education: Not on file   • Highest education level: Not on file   Tobacco Use   • Smoking status: Former Smoker   • Smokeless tobacco: Never Used   Substance and Sexual Activity   • Alcohol use: No   • Drug use: No     Allergies   Allergen Reactions   • Morphine Hives   • Morphine And Related    • Nabumetone Rash       Current Outpatient Medications:   •  ACCU-CHEK JEFF test strip, USE TO CHECK BLOOD SUGAR 4 TIMES DAILY E 11.8, Disp: 200 each, Rfl: 6  •  ACCU-CHEK SOFTCLIX LANCETS lancets, 1 each by Other route 4 (Four) Times a Day. Use as instructed, Disp: 400 each, Rfl: 3  •  amLODIPine (NORVASC) 10 MG tablet, 1/2 tablet  "twice daily, Disp: 30 tablet, Rfl: 5  •  aspirin 325 MG tablet, Take  by mouth daily., Disp: , Rfl:   •  atorvastatin (LIPITOR) 40 MG tablet, TAKE 1 TABLET DAILY, Disp: 90 tablet, Rfl: 3  •  Blood Glucose Monitoring Suppl (ACCU-CHEK JEFF PLUS) w/Device kit, USE TO CHECK BLOOD SUGAR 4 TIMES DAILY  E 11.8, Disp: 1 kit, Rfl: 0  •  Blood Glucose Monitoring Suppl (ONE TOUCH ULTRA 2) w/Device kit, 1 each 4 (Four) Times a Day., Disp: 1 each, Rfl: 1  •  fluticasone (FLONASE) 50 MCG/ACT nasal spray, 2 sprays by Each Nare route Daily., Disp: 3 bottle, Rfl: 3  •  FLUZONE HIGH-DOSE 0.5 ML suspension prefilled syringe injection, PHARMACIST ADMINISTERED IMMUNIZATION ADMINISTERED AT TIME OF DISPENSING, Disp: , Rfl: 0  •  furosemide (LASIX) 20 MG tablet, Take 20 mg by mouth Daily., Disp: , Rfl:   •  hydrALAZINE (APRESOLINE) 25 MG tablet, Take 25 mg by mouth Daily., Disp: , Rfl: 2  •  hydrocortisone 2.5 % cream, , Disp: , Rfl:   •  insulin NPH (NOVOLIN N RELION) 100 UNIT/ML injection, INJECT 25 UNITS SUBCUTANEOUSLY BEFORE BREAKFAST AND 25 BEFORE  BEDTIME, Disp: 50 mL, Rfl: 3  •  Insulin Pen Needle (PEN NEEDLES) 29G X 12MM misc, Inject 1 each under the skin 4 (Four) Times a Day. Use with Insulin, Disp: 400 each, Rfl: 1  •  insulin regular (NOVOLIN R RELION) 100 UNIT/ML injection, INJECT 10 UNITS SUBCUTANEOUSLY BEFORE BREAKFAST AND 8 AT DINNER, Disp: 10 mL, Rfl: 3  •  Insulin Syringe (RELION INSULIN SYRINGE) 29G X 1/2\" 1 ML misc, 4 times daily sc inj, Disp: 200 each, Rfl: 3  •  Lancets (ACCU-CHEK MULTICLIX) lancets, USE TO TEST BLOOD SUGAR 4 TIMES DAILY  E 11.8, Disp: 200 each, Rfl: 6  •  Multiple Vitamin (MULTI VITAMIN DAILY PO), Take  by mouth., Disp: , Rfl:   •  ONE TOUCH ULTRA TEST test strip, 1 each by Other route 4 (Four) Times a Day., Disp: 150 each, Rfl: 5  •  ONETOUCH DELICA LANCETS FINE misc, 1 each 4 (Four) Times a Day., Disp: 150 each, Rfl: 5  •  RELION INSULIN SYRINGE 1ML/31G 31G X 5/16\" 1 ML misc, USE 1 SYRINGE 4 TIMES " "DAILY, Disp: 200 each, Rfl: 1  •  tamsulosin (FLOMAX) 0.4 MG capsule 24 hr capsule, Take 1 capsule by mouth Every Night., Disp: 90 capsule, Rfl: 1        Review of Systems   Constitutional: Negative for chills, fatigue and fever.   Cardiovascular: Negative for chest pain and palpitations.   Gastrointestinal: Negative for abdominal pain, constipation, diarrhea, nausea and vomiting.   Endocrine: Negative for cold intolerance and heat intolerance.   All other systems reviewed and are negative.      Objective       Vitals:    10/03/19 1201   BP: 120/80   Pulse: 67   Weight: 125 kg (275 lb)   Height: 177.8 cm (70\")     Body mass index is 39.46 kg/m².      Physical Exam   Constitutional: He is oriented to person, place, and time. He appears well-developed and well-nourished.   HENT:   Head: Normocephalic and atraumatic.   Eyes: EOM are normal. Pupils are equal, round, and reactive to light.   Neck: Normal range of motion. Neck supple. No thyromegaly present.   Cardiovascular: Normal rate, regular rhythm, normal heart sounds and intact distal pulses.   Pulmonary/Chest: Effort normal and breath sounds normal.   Abdominal: Soft. Bowel sounds are normal. He exhibits distension. There is no tenderness.   Lymphadenopathy:     He has no cervical adenopathy.   Neurological: He is alert and oriented to person, place, and time. He has normal reflexes.   Skin: Skin is warm and dry.   Psychiatric: He has a normal mood and affect. His behavior is normal.   Nursing note and vitals reviewed.    Results Review:     I reviewed the patient's new clinical results.    Medical records reviewed  Summary:      Results Encounter on 06/11/2019   Component Date Value Ref Range Status   • Glucose 06/18/2019 174* 65 - 99 mg/dL Final   • BUN 06/18/2019 27* 8 - 23 mg/dL Final   • Creatinine 06/18/2019 1.27  0.76 - 1.27 mg/dL Final   • eGFR Non African Am 06/18/2019 55* >60 mL/min/1.73 Final    Comment: The MDRD GFR formula is only valid for adults " with stable  renal function between ages 18 and 70.     • eGFR  Am 06/18/2019 66  >60 mL/min/1.73 Final   • BUN/Creatinine Ratio 06/18/2019 21.3  7.0 - 25.0 Final   • Sodium 06/18/2019 140  136 - 145 mmol/L Final   • Potassium 06/18/2019 4.6  3.5 - 5.2 mmol/L Final   • Chloride 06/18/2019 105  98 - 107 mmol/L Final   • Total CO2 06/18/2019 25.1  22.0 - 29.0 mmol/L Final   • Calcium 06/18/2019 9.1  8.6 - 10.5 mg/dL Final   • Total Protein 06/18/2019 6.3  6.0 - 8.5 g/dL Final   • Albumin 06/18/2019 4.70  3.50 - 5.20 g/dL Final   • Globulin 06/18/2019 1.6  gm/dL Final   • A/G Ratio 06/18/2019 2.9  g/dL Final   • Total Bilirubin 06/18/2019 0.6  0.2 - 1.2 mg/dL Final   • Alkaline Phosphatase 06/18/2019 101  39 - 117 U/L Final   • AST (SGOT) 06/18/2019 9  1 - 40 U/L Final   • ALT (SGPT) 06/18/2019 15  1 - 41 U/L Final   • Hemoglobin A1C 06/18/2019 8.50* 4.80 - 5.60 % Final    Comment: Hemoglobin A1C Ranges:  Increased Risk for Diabetes  5.7% to 6.4%  Diabetes                     >= 6.5%  Diabetic Goal                < 7.0%     • Unable to Void 06/18/2019 Comment   Final    Comment: The patient was not able to render a urine sample and has been  instructed to return for a urine collection at their earliest  convenience.  The urine testing that you have requested has  been deleted from this report.  When the patient returns and  provides a urine specimen, the urine testing will be performed  and separately reported.       Lab Results   Component Value Date    HGBA1C 8.50 (H) 06/18/2019    HGBA1C 9.7 (H) 02/19/2019    HGBA1C 9.23 (H) 11/13/2018     Lab Results   Component Value Date    MICROALBUR 77.9 07/13/2018    CREATININE 1.1 07/17/2019     Imaging Results (most recent)     None          Assessment and Plan:    Claude was seen today for diabetes.    Diagnoses and all orders for this visit:    Uncontrolled type 2 diabetes mellitus with complication, with long-term current use of insulin (CMS/[facility])  -      "Comprehensive Metabolic Panel  -     Hemoglobin A1c  -     TSH  -     Microalbumin / Creatinine Urine Ratio - Urine, Clean Catch    Uncontrolled type II diabetes mellitus with nephropathy (CMS/HCC)    Uncontrolled type 2 diabetes mellitus with diabetic neuropathy, with long-term current use of insulin (CMS/[facility])    CKD stage 3 due to type 2 diabetes mellitus (CMS/HCC)    Hyperinsulinism    Encounter for long-term (current) use of insulin (CMS/[facility])    Abnormal weight gain      Uncontrolled type 2 diabetes mellitus with complication, with long-term current use of insulin (CMS/[facility])     Patient's glucometer reviewed  Most of the blood sugars are in the  range  Is currently taking NPH 20 8 in the morning and 25 at bedtime  He is taking regular insulin 12 units in the morning and 8 units at dinner    Uncontrolled type II diabetes mellitus with nephropathy (CMS/HCC)     Uncontrolled type 2 diabetes mellitus with diabetic neuropathy, with long-term current use of insulin (CMS/[facility])  Continues to complain of symptoms in both lower extremities    CKD stage 3 due to type 2 diabetes mellitus (CMS/[facility])     Hyperinsulinism  Occasional low blood sugars below 100 and patient become symptomatic  Encounter for long-term (current) use of insulin (CMS/[facility])     Abnormal weight gain  He gained about 10 pounds since last visit  He is not able to lose weight steadily    Patient reported that he has Tresiba at least 20 pens at home and would like to use it    I advised him to take Tresiba 50 units every morning  He will continue regular insulin 12 units before breakfast and 8 before dinner  Once he runs out of Tresiba he may return to NPH insulin  Patient verbalized understanding  Patient return to follow-up in 3 months    Layton Jolly MD. FACE    10/03/19      EMR Dragon / transcription disclaimer:     \"Dictated utilizing Dragon dictation\".         "

## 2019-10-04 LAB
ALBUMIN SERPL-MCNC: 4.4 G/DL (ref 3.5–5.2)
ALBUMIN/CREAT UR: 150.4 MG/G CREAT (ref 0–30)
ALBUMIN/GLOB SERPL: 1.7 G/DL
ALP SERPL-CCNC: 122 U/L (ref 39–117)
ALT SERPL-CCNC: 18 U/L (ref 1–41)
AST SERPL-CCNC: 17 U/L (ref 1–40)
BILIRUB SERPL-MCNC: 0.8 MG/DL (ref 0.2–1.2)
BUN SERPL-MCNC: 27 MG/DL (ref 8–23)
BUN/CREAT SERPL: 19.6 (ref 7–25)
CALCIUM SERPL-MCNC: 9.9 MG/DL (ref 8.6–10.5)
CHLORIDE SERPL-SCNC: 101 MMOL/L (ref 98–107)
CO2 SERPL-SCNC: 25.2 MMOL/L (ref 22–29)
CREAT SERPL-MCNC: 1.38 MG/DL (ref 0.76–1.27)
CREAT UR-MCNC: 46.4 MG/DL
GLOBULIN SER CALC-MCNC: 2.6 GM/DL
GLUCOSE SERPL-MCNC: 161 MG/DL (ref 65–99)
HBA1C MFR BLD: 9.2 % (ref 4.8–5.6)
MICROALBUMIN UR-MCNC: 69.8 UG/ML
POTASSIUM SERPL-SCNC: 4.8 MMOL/L (ref 3.5–5.2)
PROT SERPL-MCNC: 7 G/DL (ref 6–8.5)
SODIUM SERPL-SCNC: 140 MMOL/L (ref 136–145)
TSH SERPL DL<=0.005 MIU/L-ACNC: 3.2 UIU/ML (ref 0.27–4.2)

## 2019-10-25 ENCOUNTER — CONVERSION ENCOUNTER (OUTPATIENT)
Dept: PODIATRY | Facility: CLINIC | Age: 79
End: 2019-10-25

## 2019-10-25 ENCOUNTER — PROCEDURE VISIT CONVERTED (OUTPATIENT)
Dept: PODIATRY | Facility: CLINIC | Age: 79
End: 2019-10-25
Attending: PODIATRIST

## 2019-11-11 DIAGNOSIS — R35.0 BENIGN PROSTATIC HYPERPLASIA WITH URINARY FREQUENCY: ICD-10-CM

## 2019-11-11 DIAGNOSIS — N40.1 BENIGN PROSTATIC HYPERPLASIA WITH URINARY FREQUENCY: ICD-10-CM

## 2019-11-12 RX ORDER — TAMSULOSIN HYDROCHLORIDE 0.4 MG/1
CAPSULE ORAL
Qty: 90 CAPSULE | Refills: 1 | Status: SHIPPED | OUTPATIENT
Start: 2019-11-12 | End: 2019-12-02 | Stop reason: SDUPTHER

## 2019-11-18 ENCOUNTER — OFFICE VISIT (OUTPATIENT)
Dept: INTERNAL MEDICINE | Facility: CLINIC | Age: 79
End: 2019-11-18

## 2019-11-18 VITALS
WEIGHT: 274 LBS | HEART RATE: 84 BPM | TEMPERATURE: 98.5 F | HEIGHT: 70 IN | BODY MASS INDEX: 39.22 KG/M2 | OXYGEN SATURATION: 98 % | DIASTOLIC BLOOD PRESSURE: 80 MMHG | SYSTOLIC BLOOD PRESSURE: 122 MMHG

## 2019-11-18 DIAGNOSIS — J06.9 ACUTE URI: Primary | ICD-10-CM

## 2019-11-18 PROCEDURE — 99213 OFFICE O/P EST LOW 20 MIN: CPT | Performed by: NURSE PRACTITIONER

## 2019-11-18 RX ORDER — AMOXICILLIN 875 MG/1
875 TABLET, COATED ORAL EVERY 12 HOURS SCHEDULED
Qty: 14 TABLET | Refills: 0 | Status: SHIPPED | OUTPATIENT
Start: 2019-11-18 | End: 2019-11-25

## 2019-11-18 RX ORDER — GUAIFENESIN 600 MG/1
600 TABLET, EXTENDED RELEASE ORAL EVERY 12 HOURS SCHEDULED
Qty: 14 TABLET
Start: 2019-11-18 | End: 2019-11-25

## 2019-11-18 NOTE — PROGRESS NOTES
Subjective   Claude E Johnson is a 79 y.o. male who presents due to respiratory symptoms.    His nephrologist recently increased Hydralazine 25mg from bid to tid, tolerating well.      URI    This is a new problem. The current episode started in the past 7 days. The problem has been gradually worsening. There has been no fever. Associated symptoms include congestion, coughing (mainly dry and not productive), headaches, rhinorrhea, sneezing and a sore throat. Pertinent negatives include no abdominal pain, chest pain, diarrhea, dysuria, ear pain, nausea, vomiting or wheezing. He has tried antihistamine for the symptoms. The treatment provided mild relief.        The following portions of the patient's history were reviewed and updated as appropriate: allergies, current medications, past social history and problem list.    Past Medical History:   Diagnosis Date   • CKD (chronic kidney disease)    • Hyperlipidemia    • Hypertension    • Type 2 diabetes mellitus (CMS/[facility])          Current Outpatient Medications:   •  ACCU-CHEK JEFF test strip, USE TO CHECK BLOOD SUGAR 4 TIMES DAILY E 11.8, Disp: 200 each, Rfl: 6  •  ACCU-CHEK SOFTCLIX LANCETS lancets, 1 each by Other route 4 (Four) Times a Day. Use as instructed, Disp: 400 each, Rfl: 3  •  amLODIPine (NORVASC) 10 MG tablet, 1/2 tablet twice daily, Disp: 30 tablet, Rfl: 5  •  aspirin 325 MG tablet, Take  by mouth daily., Disp: , Rfl:   •  atorvastatin (LIPITOR) 40 MG tablet, TAKE 1 TABLET DAILY, Disp: 90 tablet, Rfl: 3  •  Blood Glucose Monitoring Suppl (ACCU-CHEK JEFF PLUS) w/Device kit, USE TO CHECK BLOOD SUGAR 4 TIMES DAILY  E 11.8, Disp: 1 kit, Rfl: 0  •  Blood Glucose Monitoring Suppl (ONE TOUCH ULTRA 2) w/Device kit, 1 each 4 (Four) Times a Day., Disp: 1 each, Rfl: 1  •  fluticasone (FLONASE) 50 MCG/ACT nasal spray, 2 sprays by Each Nare route Daily., Disp: 3 bottle, Rfl: 3  •  furosemide (LASIX) 20 MG tablet, Take 20 mg by mouth Daily., Disp: , Rfl:   •   "hydrALAZINE (APRESOLINE) 25 MG tablet, Take 25 mg by mouth 3 (Three) Times a Day., Disp: , Rfl: 2  •  hydrocortisone 2.5 % cream, , Disp: , Rfl:   •  insulin NPH (NOVOLIN N RELION) 100 UNIT/ML injection, INJECT 25 UNITS SUBCUTANEOUSLY BEFORE BREAKFAST AND 25 BEFORE  BEDTIME, Disp: 50 mL, Rfl: 3  •  Insulin Pen Needle (PEN NEEDLES) 29G X 12MM misc, Inject 1 each under the skin 4 (Four) Times a Day. Use with Insulin, Disp: 400 each, Rfl: 1  •  insulin regular (NOVOLIN R RELION) 100 UNIT/ML injection, INJECT 10 UNITS SUBCUTANEOUSLY BEFORE BREAKFAST AND 8 AT DINNER, Disp: 10 mL, Rfl: 3  •  Insulin Syringe (RELION INSULIN SYRINGE) 29G X 1/2\" 1 ML misc, 4 times daily sc inj, Disp: 200 each, Rfl: 3  •  Lancets (ACCU-CHEK MULTICLIX) lancets, USE TO TEST BLOOD SUGAR 4 TIMES DAILY  E 11.8, Disp: 200 each, Rfl: 6  •  Multiple Vitamin (MULTI VITAMIN DAILY PO), Take  by mouth., Disp: , Rfl:   •  ONE TOUCH ULTRA TEST test strip, 1 each by Other route 4 (Four) Times a Day., Disp: 150 each, Rfl: 5  •  RELION INSULIN SYRINGE 1ML/31G 31G X 5/16\" 1 ML misc, USE 1 SYRINGE 4 TIMES DAILY, Disp: 200 each, Rfl: 1  •  tamsulosin (FLOMAX) 0.4 MG capsule 24 hr capsule, Take 1 capsule by mouth Every Night., Disp: 90 capsule, Rfl: 1  •  tamsulosin (FLOMAX) 0.4 MG capsule 24 hr capsule, TAKE 1 CAPSULE EVERY NIGHT, Disp: 90 capsule, Rfl: 1  •  amoxicillin (AMOXIL) 875 MG tablet, Take 1 tablet by mouth Every 12 (Twelve) Hours for 7 days., Disp: 14 tablet, Rfl: 0  •  guaiFENesin (MUCINEX) 600 MG 12 hr tablet, Take 1 tablet by mouth Every 12 (Twelve) Hours for 7 days., Disp: 14 tablet, Rfl:     Allergies   Allergen Reactions   • Morphine Hives   • Morphine And Related    • Nabumetone Rash       Review of Systems   Constitutional: Positive for fatigue. Negative for activity change, appetite change, chills, fever and unexpected weight change.   HENT: Positive for congestion, postnasal drip, rhinorrhea, sinus pressure, sneezing and sore throat. " "Negative for drooling, ear pain, facial swelling, hearing loss, mouth sores, nosebleeds, trouble swallowing and voice change.    Eyes: Negative for pain, discharge, itching and visual disturbance.   Respiratory: Positive for cough (mainly dry and not productive). Negative for choking, chest tightness, shortness of breath and wheezing.    Cardiovascular: Negative for chest pain and palpitations.   Gastrointestinal: Negative for abdominal pain, constipation, diarrhea, nausea and vomiting.   Endocrine: Negative for polyuria.   Genitourinary: Negative for dysuria and frequency.   Musculoskeletal: Negative for back pain and joint swelling.   Neurological: Positive for headaches.       Objective   Vitals:    11/18/19 0859   BP: 122/80   BP Location: Left arm   Patient Position: Sitting   Cuff Size: Adult   Pulse: 84   Temp: 98.5 °F (36.9 °C)   TempSrc: Oral   SpO2: 98%   Weight: 124 kg (274 lb)   Height: 177.8 cm (70\")     Body mass index is 39.31 kg/m².  Physical Exam   Constitutional: He appears well-developed and well-nourished. He is cooperative. He does not have a sickly appearance. He does not appear ill.   HENT:   Head: Normocephalic.   Right Ear: Hearing, tympanic membrane and external ear normal. No drainage, swelling or tenderness. Tympanic membrane is not injected, not erythematous and not bulging. No middle ear effusion.   Left Ear: Hearing, tympanic membrane and external ear normal. No drainage, swelling or tenderness. Tympanic membrane is not injected, not erythematous and not bulging.  No middle ear effusion.   Nose: Nose normal. No mucosal edema, rhinorrhea or sinus tenderness. Right sinus exhibits no maxillary sinus tenderness and no frontal sinus tenderness. Left sinus exhibits no maxillary sinus tenderness and no frontal sinus tenderness.   Mouth/Throat: Mucous membranes are normal. Posterior oropharyngeal erythema present.   Eyes: Conjunctivae and lids are normal. Right eye exhibits no discharge and no " exudate. Left eye exhibits no discharge and no exudate.   Neck: Trachea normal and normal range of motion. Edema present.   Cardiovascular: Regular rhythm, normal heart sounds and normal pulses.   No murmur heard.  Pulmonary/Chest: Breath sounds normal. No respiratory distress. He has no decreased breath sounds. He has no wheezes. He has no rhonchi. He has no rales.   Lymphadenopathy:     He has no cervical adenopathy.   Neurological: He is alert.   Skin: Skin is warm, dry and intact.   Nursing note and vitals reviewed.      Assessment/Plan   Claude was seen today for uri.    Diagnoses and all orders for this visit:    Acute URI  -     amoxicillin (AMOXIL) 875 MG tablet; Take 1 tablet by mouth Every 12 (Twelve) Hours for 7 days.  -     guaiFENesin (MUCINEX) 600 MG 12 hr tablet; Take 1 tablet by mouth Every 12 (Twelve) Hours for 7 days.    He will continue Flonase for sinus pressure and drainage, add Mucinex for increased cough.

## 2019-12-02 ENCOUNTER — OFFICE VISIT (OUTPATIENT)
Dept: INTERNAL MEDICINE | Facility: CLINIC | Age: 79
End: 2019-12-02

## 2019-12-02 VITALS
WEIGHT: 277 LBS | SYSTOLIC BLOOD PRESSURE: 124 MMHG | DIASTOLIC BLOOD PRESSURE: 80 MMHG | BODY MASS INDEX: 39.65 KG/M2 | HEIGHT: 70 IN

## 2019-12-02 DIAGNOSIS — I25.10 CHRONIC CORONARY ARTERY DISEASE: ICD-10-CM

## 2019-12-02 DIAGNOSIS — E78.00 HYPERCHOLESTEROLEMIA: ICD-10-CM

## 2019-12-02 DIAGNOSIS — E11.22 CKD STAGE 2 DUE TO TYPE 2 DIABETES MELLITUS (HCC): ICD-10-CM

## 2019-12-02 DIAGNOSIS — N18.2 CKD STAGE 2 DUE TO TYPE 2 DIABETES MELLITUS (HCC): ICD-10-CM

## 2019-12-02 DIAGNOSIS — I10 HYPERTENSION, BENIGN: Primary | ICD-10-CM

## 2019-12-02 DIAGNOSIS — Z12.11 SCREENING FOR COLON CANCER: ICD-10-CM

## 2019-12-02 LAB
ALBUMIN SERPL-MCNC: 4.2 G/DL (ref 3.5–5.2)
ALBUMIN/GLOB SERPL: 1.6 G/DL
ALP SERPL-CCNC: 104 U/L (ref 39–117)
ALT SERPL W P-5'-P-CCNC: 19 U/L (ref 1–41)
ANION GAP SERPL CALCULATED.3IONS-SCNC: 11.5 MMOL/L (ref 5–15)
AST SERPL-CCNC: 14 U/L (ref 1–40)
BILIRUB SERPL-MCNC: 0.7 MG/DL (ref 0.2–1.2)
BUN BLD-MCNC: 27 MG/DL (ref 8–23)
BUN/CREAT SERPL: 18.5 (ref 7–25)
CALCIUM SPEC-SCNC: 9.5 MG/DL (ref 8.6–10.5)
CHLORIDE SERPL-SCNC: 105 MMOL/L (ref 98–107)
CHOLEST SERPL-MCNC: 131 MG/DL (ref 0–200)
CO2 SERPL-SCNC: 23.5 MMOL/L (ref 22–29)
CREAT BLD-MCNC: 1.46 MG/DL (ref 0.76–1.27)
GFR SERPL CREATININE-BSD FRML MDRD: 46 ML/MIN/1.73
GLOBULIN UR ELPH-MCNC: 2.7 GM/DL
GLUCOSE BLD-MCNC: 220 MG/DL (ref 65–99)
HDLC SERPL-MCNC: 39 MG/DL (ref 40–60)
LDLC SERPL CALC-MCNC: 75 MG/DL (ref 0–100)
LDLC/HDLC SERPL: 1.93 {RATIO}
POTASSIUM BLD-SCNC: 4.6 MMOL/L (ref 3.5–5.2)
PROT SERPL-MCNC: 6.9 G/DL (ref 6–8.5)
SODIUM BLD-SCNC: 140 MMOL/L (ref 136–145)
TRIGL SERPL-MCNC: 84 MG/DL (ref 0–150)
VLDLC SERPL-MCNC: 16.8 MG/DL

## 2019-12-02 PROCEDURE — G0439 PPPS, SUBSEQ VISIT: HCPCS | Performed by: NURSE PRACTITIONER

## 2019-12-02 PROCEDURE — 36415 COLL VENOUS BLD VENIPUNCTURE: CPT | Performed by: NURSE PRACTITIONER

## 2019-12-02 PROCEDURE — 80061 LIPID PANEL: CPT | Performed by: NURSE PRACTITIONER

## 2019-12-02 PROCEDURE — 99214 OFFICE O/P EST MOD 30 MIN: CPT | Performed by: NURSE PRACTITIONER

## 2019-12-02 PROCEDURE — 80053 COMPREHEN METABOLIC PANEL: CPT | Performed by: NURSE PRACTITIONER

## 2019-12-02 NOTE — PROGRESS NOTES
The ABCs of the Annual Wellness Visit  Subsequent Medicare Wellness Visit    Chief Complaint   Patient presents with   • Medicare Wellness-subsequent   • Diabetes Mellitus   • Hypertension   • Hyperlipidemia       Subjective   History of Present Illness:  Claude E Johnson is a 79 y.o. male who presents for a Subsequent Medicare Wellness Visit.    HEALTH RISK ASSESSMENT    Recent Hospitalizations:  No hospitalization(s) within the last year.    Current Medical Providers:  Patient Care Team:  Shanice Cummings APRN as PCP - General (Internal Medicine)  Shanice Cummings APRN as PCP - Claims Attributed  Yossi Saucedo MD as Consulting Physician (Cardiology)  Jordy Rene MD as Consulting Physician (Nephrology)    Smoking Status:  Social History     Tobacco Use   Smoking Status Former Smoker   Smokeless Tobacco Never Used       Alcohol Consumption:  Social History     Substance and Sexual Activity   Alcohol Use No       Depression Screen:   PHQ-2/PHQ-9 Depression Screening 12/2/2019   Little interest or pleasure in doing things 0   Feeling down, depressed, or hopeless 0   Trouble falling or staying asleep, or sleeping too much 3   Feeling tired or having little energy 1   Poor appetite or overeating 0   Feeling bad about yourself - or that you are a failure or have let yourself or your family down 0   Trouble concentrating on things, such as reading the newspaper or watching television 0   Moving or speaking so slowly that other people could have noticed. Or the opposite - being so fidgety or restless that you have been moving around a lot more than usual 0   Thoughts that you would be better off dead, or of hurting yourself in some way 0   Total Score 4   If you checked off any problems, how difficult have these problems made it for you to do your work, take care of things at home, or get along with other people? Not difficult at all       Fall Risk Screen:  JAIDEN Fall Risk Assessment was  completed, and patient is at LOW risk for falls.Assessment completed on:12/2/2019    Health Habits and Functional and Cognitive Screening:  Functional & Cognitive Status 12/2/2019   Do you have difficulty preparing food and eating? No   Do you have difficulty bathing yourself, getting dressed or grooming yourself? No   Do you have difficulty using the toilet? No   Do you have difficulty moving around from place to place? No   Do you have trouble with steps or getting out of a bed or a chair? No   Current Diet Well Balanced Diet   Dental Exam Up to date   Eye Exam Up to date   Exercise (times per week) 3 times per week   Current Exercise Activities Include Walking   Do you need help using the phone?  No   Are you deaf or do you have serious difficulty hearing?  Yes   Do you need help with transportation? No   Do you need help shopping? No   Do you need help preparing meals?  No   Do you need help with housework?  No   Do you need help with laundry? No   Do you need help taking your medications? No   Do you need help managing money? No   Do you ever drive or ride in a car without wearing a seat belt? No   Have you felt unusual stress, anger or loneliness in the last month? No   Who do you live with? Alone   If you need help, do you have trouble finding someone available to you? No   Have you been bothered in the last four weeks by sexual problems? No   Do you have difficulty concentrating, remembering or making decisions? No         Does the patient have evidence of cognitive impairment? No    Asprin use counseling:Taking ASA appropriately as indicated    Age-appropriate Screening Schedule:  Refer to the list below for future screening recommendations based on patient's age, sex and/or medical conditions. Orders for these recommended tests are listed in the plan section. The patient has been provided with a written plan.    Health Maintenance   Topic Date Due   • TDAP/TD VACCINES (1 - Tdap) 09/06/1959   • DIABETIC EYE  "EXAM  09/28/2018   • COLONOSCOPY  05/27/2019   • HEMOGLOBIN A1C  04/03/2020   • URINE MICROALBUMIN  10/03/2020   • LIPID PANEL  12/02/2020   • INFLUENZA VACCINE  Completed   • PNEUMOCOCCAL VACCINES (65+ LOW/MEDIUM RISK)  Completed   • ZOSTER VACCINE  Completed          The following portions of the patient's history were reviewed and updated as appropriate: allergies, current medications, past family history, past medical history, past social history, past surgical history and problem list.    Outpatient Medications Prior to Visit   Medication Sig Dispense Refill   • ACCU-CHEK JEFF test strip USE TO CHECK BLOOD SUGAR 4 TIMES DAILY E 11.8 200 each 6   • ACCU-CHEK SOFTCLIX LANCETS lancets 1 each by Other route 4 (Four) Times a Day. Use as instructed 400 each 3   • amLODIPine (NORVASC) 10 MG tablet 1/2 tablet twice daily 30 tablet 5   • aspirin 325 MG tablet Take  by mouth daily.     • atorvastatin (LIPITOR) 40 MG tablet TAKE 1 TABLET DAILY 90 tablet 3   • Blood Glucose Monitoring Suppl (ACCU-CHEK JEFF PLUS) w/Device kit USE TO CHECK BLOOD SUGAR 4 TIMES DAILY  E 11.8 1 kit 0   • Blood Glucose Monitoring Suppl (ONE TOUCH ULTRA 2) w/Device kit 1 each 4 (Four) Times a Day. 1 each 1   • fluticasone (FLONASE) 50 MCG/ACT nasal spray 2 sprays by Each Nare route Daily. 3 bottle 3   • furosemide (LASIX) 20 MG tablet Take 20 mg by mouth Daily.     • hydrALAZINE (APRESOLINE) 25 MG tablet Take 25 mg by mouth 3 (Three) Times a Day.  2   • hydrocortisone 2.5 % cream      • insulin NPH (NOVOLIN N RELION) 100 UNIT/ML injection INJECT 25 UNITS SUBCUTANEOUSLY BEFORE BREAKFAST AND 25 BEFORE  BEDTIME 50 mL 3   • Insulin Pen Needle (PEN NEEDLES) 29G X 12MM misc Inject 1 each under the skin 4 (Four) Times a Day. Use with Insulin 400 each 1   • insulin regular (NOVOLIN R RELION) 100 UNIT/ML injection INJECT 10 UNITS SUBCUTANEOUSLY BEFORE BREAKFAST AND 8 AT DINNER 10 mL 3   • Insulin Syringe (RELION INSULIN SYRINGE) 29G X 1/2\" 1 ML misc 4 " "times daily sc inj 200 each 3   • Lancets (ACCU-CHEK MULTICLIX) lancets USE TO TEST BLOOD SUGAR 4 TIMES DAILY  E 11.8 200 each 6   • Multiple Vitamin (MULTI VITAMIN DAILY PO) Take  by mouth.     • ONE TOUCH ULTRA TEST test strip 1 each by Other route 4 (Four) Times a Day. 150 each 5   • RELION INSULIN SYRINGE 1ML/31G 31G X 5/16\" 1 ML misc USE 1 SYRINGE 4 TIMES DAILY 200 each 1   • tamsulosin (FLOMAX) 0.4 MG capsule 24 hr capsule Take 1 capsule by mouth Every Night. 90 capsule 1   • tamsulosin (FLOMAX) 0.4 MG capsule 24 hr capsule TAKE 1 CAPSULE EVERY NIGHT 90 capsule 1     No facility-administered medications prior to visit.        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)   • Abdominal aortic aneurysm (CMS/HCC)   • Chronic coronary artery disease   • Carpal tunnel syndrome of right wrist   • Hypertension   • Plantar fasciitis   • Rotator cuff tear arthropathy   • History of artificial joint   • Uncontrolled type 2 diabetes mellitus with complication, with long-term current use of insulin (CMS/HCC)   • Benign prostatic hyperplasia with urinary frequency   • Hyperinsulinism   • Abnormal weight gain   • Encounter for long-term (current) use of insulin (CMS/HCC)   • 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 Have asked the patient to send this to us to add to record    Review of Systems    Compared to one year ago, the patient feels his physical health is the same.  Although he does complain of increased stiffness  Compared to one year ago, the patient feels his mental health is the same.    Reviewed chart for potential of high risk medication in the elderly: yes  Reviewed chart for potential of harmful drug interactions in the elderly:yes    Objective         Vitals:    12/02/19 0901   BP: 124/80   BP Location: Left arm   Patient Position: " "Sitting   Cuff Size: Adult   Weight: 126 kg (277 lb)   Height: 177.8 cm (70\")   PainSc:   2   PainLoc: Knee       Body mass index is 39.75 kg/m².  Discussed the patient's BMI with him. The BMI is above average; BMI management plan is completed.    Physical Exam    Lab Results   Component Value Date     (H) 10/03/2019    TRIG 84 12/02/2019    HDL 39 (L) 12/02/2019    LDL 75 12/02/2019    VLDL 16.8 12/02/2019    HGBA1C 9.20 (H) 10/03/2019        Assessment/Plan   Medicare Risks and Personalized Health Plan  CMS Preventative Services Quick Reference  Colon Cancer Screening  Immunizations Discussed/Encouraged (specific immunizations; adacel Tdap )  Obesity/Overweight   diabetic eye exam    The above risks/problems have been discussed with the patient.  Pertinent information has been shared with the patient in the After Visit Summary.  Follow up plans and orders are seen below in the Assessment/Plan Section.    Diagnoses and all orders for this visit:    1. Hypertension, benign (Primary)    2. CKD stage 2 due to type 2 diabetes mellitus (CMS/[facility])    3. Chronic coronary artery disease    4. Hypercholesterolemia  -     Comprehensive Metabolic Panel; Future  -     Lipid Panel; Future  -     Comprehensive Metabolic Panel  -     Lipid Panel    5. Screening for colon cancer  -     Ambulatory Referral to Gastroenterology      Follow Up:  Return in about 4 months (around 4/2/2020).     An After Visit Summary and PPPS were given to the patient.             "

## 2019-12-03 NOTE — PATIENT INSTRUCTIONS
Medicare Wellness  Personal Prevention Plan of Service     Date of Office Visit:  2019  Encounter Provider:  ESTEE Abraham  Place of Service:  [facility] INTERNAL MEDICINE  Patient Name: Claude E Johnson  :  1940    As part of the Medicare Wellness portion of your visit today, we are providing you with this personalized preventive plan of services (PPPS). This plan is based upon recommendations of the [place] Preventive Services Task Force (USPSTF) and the Advisory Committee on Immunization Practices (ACIP).    This lists the preventive care services that should be considered, and provides dates of when you are due. Items listed as completed are up-to-date and do not require any further intervention.    Health Maintenance   Topic Date Due   • TDAP/TD VACCINES (1 - Tdap) 1958   • DIABETIC EYE EXAM  2018   • COLONOSCOPY  2019   • MEDICARE ANNUAL WELLNESS  2019   • HEMOGLOBIN A1C  2020   • URINE MICROALBUMIN  10/03/2020   • LIPID PANEL  2020   • INFLUENZA VACCINE  Completed   • PNEUMOCOCCAL VACCINES (65+ LOW/MEDIUM RISK)  Completed   • ZOSTER VACCINE  Completed       Orders Placed This Encounter   Procedures   • Comprehensive Metabolic Panel     Standing Status:   Future     Number of Occurrences:   1     Standing Expiration Date:   2020   • Lipid Panel     Standing Status:   Future     Number of Occurrences:   1     Standing Expiration Date:   2020   • Ambulatory Referral to Gastroenterology     Referral Priority:   Routine     Referral Type:   Consultation     Referral Reason:   Specialty Services Required     Referred to Provider:   Tyler Warner MD     Requested Specialty:   Gastroenterology     Number of Visits Requested:   1       Return in about 4 months (around 2020).

## 2020-01-08 ENCOUNTER — ANESTHESIA EVENT (OUTPATIENT)
Dept: GASTROENTEROLOGY | Facility: HOSPITAL | Age: 80
End: 2020-01-08

## 2020-01-08 ENCOUNTER — ON CAMPUS - OUTPATIENT (OUTPATIENT)
Dept: URBAN - METROPOLITAN AREA HOSPITAL 114 | Facility: HOSPITAL | Age: 80
End: 2020-01-08
Payer: COMMERCIAL

## 2020-01-08 ENCOUNTER — HOSPITAL ENCOUNTER (OUTPATIENT)
Facility: HOSPITAL | Age: 80
Setting detail: HOSPITAL OUTPATIENT SURGERY
Discharge: HOME | End: 2020-01-08
Attending: INTERNAL MEDICINE | Admitting: INTERNAL MEDICINE

## 2020-01-08 ENCOUNTER — ANESTHESIA (OUTPATIENT)
Dept: GASTROENTEROLOGY | Facility: HOSPITAL | Age: 80
End: 2020-01-08

## 2020-01-08 VITALS
WEIGHT: 269.44 LBS | HEART RATE: 61 BPM | TEMPERATURE: 98.4 F | OXYGEN SATURATION: 95 % | SYSTOLIC BLOOD PRESSURE: 122 MMHG | DIASTOLIC BLOOD PRESSURE: 71 MMHG | RESPIRATION RATE: 20 BRPM | BODY MASS INDEX: 38.66 KG/M2

## 2020-01-08 DIAGNOSIS — K64.1 SECOND DEGREE HEMORRHOIDS: ICD-10-CM

## 2020-01-08 DIAGNOSIS — K57.30 DIVERTICULOSIS OF LARGE INTESTINE WITHOUT PERFORATION OR ABS: ICD-10-CM

## 2020-01-08 DIAGNOSIS — Z86.010 PERSONAL HISTORY OF COLONIC POLYPS: ICD-10-CM

## 2020-01-08 DIAGNOSIS — D12.3 BENIGN NEOPLASM OF TRANSVERSE COLON: ICD-10-CM

## 2020-01-08 DIAGNOSIS — Z86.010 HX OF COLONIC POLYPS: ICD-10-CM

## 2020-01-08 DIAGNOSIS — D12.0 BENIGN NEOPLASM OF CECUM: ICD-10-CM

## 2020-01-08 LAB — GLUCOSE BLDC GLUCOMTR-MCNC: 116 MG/DL (ref 70–130)

## 2020-01-08 PROCEDURE — 25010000002 PROPOFOL 10 MG/ML EMULSION: Performed by: ANESTHESIOLOGY

## 2020-01-08 PROCEDURE — 45380 COLONOSCOPY AND BIOPSY: CPT | Mod: PT | Performed by: INTERNAL MEDICINE

## 2020-01-08 PROCEDURE — 82962 GLUCOSE BLOOD TEST: CPT

## 2020-01-08 PROCEDURE — 88305 TISSUE EXAM BY PATHOLOGIST: CPT | Performed by: INTERNAL MEDICINE

## 2020-01-08 RX ORDER — SODIUM CHLORIDE, SODIUM LACTATE, POTASSIUM CHLORIDE, CALCIUM CHLORIDE 600; 310; 30; 20 MG/100ML; MG/100ML; MG/100ML; MG/100ML
1000 INJECTION, SOLUTION INTRAVENOUS CONTINUOUS
Status: DISCONTINUED | OUTPATIENT
Start: 2020-01-08 | End: 2020-01-08 | Stop reason: HOSPADM

## 2020-01-08 RX ORDER — PROPOFOL 10 MG/ML
VIAL (ML) INTRAVENOUS AS NEEDED
Status: DISCONTINUED | OUTPATIENT
Start: 2020-01-08 | End: 2020-01-08 | Stop reason: SURG

## 2020-01-08 RX ORDER — PROPOFOL 10 MG/ML
VIAL (ML) INTRAVENOUS CONTINUOUS PRN
Status: DISCONTINUED | OUTPATIENT
Start: 2020-01-08 | End: 2020-01-08 | Stop reason: SURG

## 2020-01-08 RX ORDER — LIDOCAINE HYDROCHLORIDE 20 MG/ML
INJECTION, SOLUTION INFILTRATION; PERINEURAL AS NEEDED
Status: DISCONTINUED | OUTPATIENT
Start: 2020-01-08 | End: 2020-01-08 | Stop reason: SURG

## 2020-01-08 RX ADMIN — SODIUM CHLORIDE, POTASSIUM CHLORIDE, SODIUM LACTATE AND CALCIUM CHLORIDE 1000 ML: 600; 310; 30; 20 INJECTION, SOLUTION INTRAVENOUS at 10:32

## 2020-01-08 RX ADMIN — PROPOFOL 75 MG: 10 INJECTION, EMULSION INTRAVENOUS at 11:17

## 2020-01-08 RX ADMIN — LIDOCAINE HYDROCHLORIDE 50 MG: 20 INJECTION, SOLUTION INFILTRATION; PERINEURAL at 11:16

## 2020-01-08 RX ADMIN — PROPOFOL 125 MCG/KG/MIN: 10 INJECTION, EMULSION INTRAVENOUS at 11:17

## 2020-01-08 NOTE — ANESTHESIA POSTPROCEDURE EVALUATION
Patient: Claude E Johnson    Procedure Summary     Date:  01/08/20 Room / [facility]:   [facility]    Anesthesia Start:  1110 Anesthesia Stop:  1143    Procedure:  COLONOSCOPY to Cecum and TI with Cold Polypectomies (N/A ) Diagnosis:      Surgeon:  Tyler Warner MD Provider:  Natan Lopez MD    Anesthesia Type:  MAC ASA Status:  3          Anesthesia Type: MAC    Vitals  Vitals Value Taken Time   /71 1/8/2020 12:01 PM   Temp     Pulse 61 1/8/2020 12:01 PM   Resp 20 1/8/2020 12:01 PM   SpO2 95 % 1/8/2020 12:01 PM           Post Anesthesia Care and Evaluation    Patient location during evaluation: bedside  Patient participation: complete - patient participated  Level of consciousness: awake and alert  Pain score: 0  Pain management: adequate  Airway patency: patent  Anesthetic complications: No anesthetic complications    Cardiovascular status: acceptable  Respiratory status: acceptable  Hydration status: acceptable    Comments: /71 (BP Location: Left arm, Patient Position: Lying)   Pulse 61   Temp 36.9 °C (98.4 °F) (Oral)   Resp 20   Wt 122 kg (269 lb 7 oz)   SpO2 95%   BMI 38.66 kg/m²

## 2020-01-08 NOTE — ANESTHESIA PREPROCEDURE EVALUATION
Anesthesia Evaluation     Patient summary reviewed                Airway   No difficulty expected  Dental      Pulmonary    Cardiovascular     Rhythm: regular    (+) hypertension, CAD, CABG,       Neuro/Psych  GI/Hepatic/Renal/Endo    (+) obesity,   diabetes mellitus,     Musculoskeletal     Abdominal    Substance History      OB/GYN          Other                        Anesthesia Plan    ASA 3     MAC       Anesthetic plan, all risks, benefits, and alternatives have been provided, discussed and informed consent has been obtained with: patient.

## 2020-01-08 NOTE — H&P
Patient Care Team:  Shanice Cummings APRN as PCP - General (Internal Medicine)  Layton Jolly MD as PCP - [facility], Yossi Kirkpatrick MD as Consulting Physician (Cardiology)  Jordy Rene MD as Consulting Physician (Nephrology)    Chief complaint hx of polyps    Subjective     History of Present Illness  The patient presents with no gastrointestinal  Symptoms or issues at this time   Review of Systems   All other systems reviewed and are negative.       Past Medical History:   Diagnosis Date   • CKD (chronic kidney disease)    • Coronary artery disease    • Hyperlipidemia    • Hypertension    • Type 2 diabetes mellitus (CMS/HCC)      Past Surgical History:   Procedure Laterality Date   • ANKLE SURGERY     • CORONARY ARTERY BYPASS GRAFT     • JOINT REPLACEMENT     • REPLACEMENT TOTAL KNEE     • SHOULDER SURGERY       Family History   Problem Relation Age of Onset   • Cancer Mother    • Heart disease Father      Social History     Tobacco Use   • Smoking status: Former Smoker   • Smokeless tobacco: Never Used   Substance Use Topics   • Alcohol use: No   • Drug use: No     Medications Prior to Admission   Medication Sig Dispense Refill Last Dose   • ACCU-CHEK JEFF test strip USE TO CHECK BLOOD SUGAR 4 TIMES DAILY E 11.8 200 each 6 1/8/2020 at Unknown time   • ACCU-CHEK SOFTCLIX LANCETS lancets 1 each by Other route 4 (Four) Times a Day. Use as instructed 400 each 3 1/8/2020 at Unknown time   • amLODIPine (NORVASC) 10 MG tablet 1/2 tablet twice daily 30 tablet 5 1/8/2020 at Unknown time   • atorvastatin (LIPITOR) 40 MG tablet TAKE 1 TABLET DAILY 90 tablet 3 1/7/2020 at Unknown time   • Blood Glucose Monitoring Suppl (ACCU-CHEK JEFF PLUS) w/Device kit USE TO CHECK BLOOD SUGAR 4 TIMES DAILY  E 11.8 1 kit 0 1/8/2020 at Unknown time   • Blood Glucose Monitoring Suppl (ONE TOUCH ULTRA 2) w/Device kit 1 each 4 (Four) Times a Day. 1 each 1 1/8/2020 at Unknown time   • fluticasone  "(FLONASE) 50 MCG/ACT nasal spray 2 sprays by Each Nare route Daily. 3 bottle 3 1/7/2020 at Unknown time   • furosemide (LASIX) 20 MG tablet Take 20 mg by mouth Daily.   1/8/2020 at Unknown time   • hydrALAZINE (APRESOLINE) 25 MG tablet Take 25 mg by mouth 3 (Three) Times a Day.  2 1/8/2020 at Unknown time   • hydrocortisone 2.5 % cream    Past Month at Unknown time   • insulin NPH (NOVOLIN N RELION) 100 UNIT/ML injection INJECT 25 UNITS SUBCUTANEOUSLY BEFORE BREAKFAST AND 25 BEFORE  BEDTIME 50 mL 3 1/7/2020 at Unknown time   • Insulin Pen Needle (PEN NEEDLES) 29G X 12MM misc Inject 1 each under the skin 4 (Four) Times a Day. Use with Insulin 400 each 1 1/7/2020 at Unknown time   • insulin regular (NOVOLIN R RELION) 100 UNIT/ML injection INJECT 10 UNITS SUBCUTANEOUSLY BEFORE BREAKFAST AND 8 AT DINNER 10 mL 3 1/8/2020 at Unknown time   • Insulin Syringe (RELION INSULIN SYRINGE) 29G X 1/2\" 1 ML misc 4 times daily sc inj 200 each 3 1/8/2020 at Unknown time   • Lancets (ACCU-CHEK MULTICLIX) lancets USE TO TEST BLOOD SUGAR 4 TIMES DAILY  E 11.8 200 each 6 1/8/2020 at Unknown time   • Multiple Vitamin (MULTI VITAMIN DAILY PO) Take  by mouth.   1/8/2020 at Unknown time   • ONE TOUCH ULTRA TEST test strip 1 each by Other route 4 (Four) Times a Day. 150 each 5 1/8/2020 at Unknown time   • RELION INSULIN SYRINGE 1ML/31G 31G X 5/16\" 1 ML misc USE 1 SYRINGE 4 TIMES DAILY 200 each 1 1/7/2020 at Unknown time   • tamsulosin (FLOMAX) 0.4 MG capsule 24 hr capsule Take 1 capsule by mouth Every Night. 90 capsule 1 1/7/2020 at Unknown time   • aspirin 325 MG tablet Take  by mouth daily.   1/3/2020     Allergies:  Morphine; Morphine and related; and Nabumetone    Objective      Vital Signs  Temp:  [98.4 °F (36.9 °C)] 98.4 °F (36.9 °C)  Heart Rate:  [62] 62  Resp:  [12] 12  BP: (143)/(84) 143/84    Physical Exam   Constitutional: He is oriented to person, place, and time. He appears well-developed and well-nourished.   HENT: "   Mouth/Throat: Oropharynx is clear and moist.   Eyes: Conjunctivae are normal.   Neck: Neck supple.   Cardiovascular: Normal rate and regular rhythm.   Pulmonary/Chest: Effort normal and breath sounds normal.   Abdominal: Soft. Bowel sounds are normal.   Neurological: He is alert and oriented to person, place, and time.   Skin: Skin is warm and dry.   Psychiatric: He has a normal mood and affect.       Results Review:   I reviewed the patient's new clinical results.      Assessment/Plan       * No active hospital problems. *      Assessment:  (Personal history of colon polyps ).     Plan:   (Colonoscopy risks, alternatives and benefits discussed with patient and the patient is agreeable to having procedure done.).       I discussed the patients findings and my recommendations with patient and nursing staff    Tyler Warner MD  01/08/20  11:17 AM

## 2020-01-09 LAB
CYTO UR: NORMAL
LAB AP CASE REPORT: NORMAL
PATH REPORT.FINAL DX SPEC: NORMAL
PATH REPORT.GROSS SPEC: NORMAL

## 2020-01-23 ENCOUNTER — OFFICE VISIT (OUTPATIENT)
Dept: ENDOCRINOLOGY | Age: 80
End: 2020-01-23

## 2020-01-23 VITALS
WEIGHT: 271 LBS | HEIGHT: 70 IN | BODY MASS INDEX: 38.8 KG/M2 | DIASTOLIC BLOOD PRESSURE: 78 MMHG | SYSTOLIC BLOOD PRESSURE: 118 MMHG | HEART RATE: 64 BPM

## 2020-01-23 DIAGNOSIS — IMO0002 UNCONTROLLED TYPE 2 DIABETES MELLITUS WITH COMPLICATION, WITH LONG-TERM CURRENT USE OF INSULIN: Primary | ICD-10-CM

## 2020-01-23 DIAGNOSIS — E16.1 HYPERINSULINISM: ICD-10-CM

## 2020-01-23 DIAGNOSIS — R63.5 ABNORMAL WEIGHT GAIN: ICD-10-CM

## 2020-01-23 DIAGNOSIS — E11.22 CKD STAGE 3 DUE TO TYPE 2 DIABETES MELLITUS (HCC): ICD-10-CM

## 2020-01-23 DIAGNOSIS — N18.30 CKD STAGE 3 DUE TO TYPE 2 DIABETES MELLITUS (HCC): ICD-10-CM

## 2020-01-23 DIAGNOSIS — IMO0002 UNCONTROLLED TYPE 2 DIABETES MELLITUS WITH DIABETIC NEUROPATHY, WITH LONG-TERM CURRENT USE OF INSULIN: ICD-10-CM

## 2020-01-23 DIAGNOSIS — Z79.4 ENCOUNTER FOR LONG-TERM (CURRENT) USE OF INSULIN (HCC): ICD-10-CM

## 2020-01-23 DIAGNOSIS — IMO0002 UNCONTROLLED TYPE II DIABETES MELLITUS WITH NEPHROPATHY: ICD-10-CM

## 2020-01-23 PROCEDURE — 99214 OFFICE O/P EST MOD 30 MIN: CPT | Performed by: INTERNAL MEDICINE

## 2020-01-23 NOTE — PROGRESS NOTES
79 y.o.    Patient Care Team:  Shanice Cummings APRN as PCP - General (Internal Medicine)  Layton Jolly MD as PCP - Claims [facility] as Consulting Physician (Cardiology)  Jordy Rene MD as Consulting Physician (Nephrology)    Chief Complaint:      F/U TYPE 2 DIABETES, UNCONTROLLED.  NO RECENT LABS.     Subjective     HPI   Patient is a 79-year-old white male with a past history of uncontrolled type 2 diabetes mellitus with complications came for follow-up    Uncontrolled type 2 diabetes mellitus  Patient is currently taking NPH and regular insulin twice daily  He takes NPH 28 units before breakfast and 25 before bedtime  He takes regular insulin 12 units before breakfast and 8 before dinner  He reports most of the blood sugars are less than 200  He recently started eating only 2 meals daily and cut out on the sodas and blood sugars are improving  Uncontrolled type 2 diabetes mellitus with neuropathy  Patient reports symptoms of tingling numbness and burning in both feet  Uncontrolled type 2 diabetes mellitus with nephropathy  Patient has elevated creatinine with chronic kidney disease  He has follow-up with a nephrologist  Patient denies any knowledge of diabetic retinopathy      Interval History      The following portions of the patient's history were reviewed and updated as appropriate: allergies, current medications, past family history, past medical history, past social history, past surgical history and problem list.    Past Medical History:   Diagnosis Date   • CKD (chronic kidney disease)    • Coronary artery disease    • Hyperlipidemia    • Hypertension    • Type 2 diabetes mellitus (CMS/[facility])      Family History   Problem Relation Age of Onset   • Cancer Mother    • Heart disease Father      Social History     Socioeconomic History   • Marital status:      Spouse name: Not on file   • Number of children: Not on file   • Years of education: Not  "on file   • Highest education level: Not on file   Tobacco Use   • Smoking status: Former Smoker   • Smokeless tobacco: Never Used   Substance and Sexual Activity   • Alcohol use: No   • Drug use: No   • Sexual activity: Defer     Allergies   Allergen Reactions   • Morphine Hives   • Morphine And Related    • Nabumetone Rash       Current Outpatient Medications:   •  ACCU-CHEK JEFF test strip, USE TO CHECK BLOOD SUGAR 4 TIMES DAILY E 11.8, Disp: 200 each, Rfl: 6  •  ACCU-CHEK SOFTCLIX LANCETS lancets, 1 each by Other route 4 (Four) Times a Day. Use as instructed, Disp: 400 each, Rfl: 3  •  amLODIPine (NORVASC) 10 MG tablet, 1/2 tablet twice daily, Disp: 30 tablet, Rfl: 5  •  aspirin 325 MG tablet, Take  by mouth daily., Disp: , Rfl:   •  atorvastatin (LIPITOR) 40 MG tablet, TAKE 1 TABLET DAILY, Disp: 90 tablet, Rfl: 3  •  Blood Glucose Monitoring Suppl (ACCU-CHEK JEFF PLUS) w/Device kit, USE TO CHECK BLOOD SUGAR 4 TIMES DAILY  E 11.8, Disp: 1 kit, Rfl: 0  •  fluticasone (FLONASE) 50 MCG/ACT nasal spray, 2 sprays by Each Nare route Daily., Disp: 3 bottle, Rfl: 3  •  furosemide (LASIX) 20 MG tablet, Take 20 mg by mouth Daily., Disp: , Rfl:   •  hydrALAZINE (APRESOLINE) 25 MG tablet, Take 25 mg by mouth 3 (Three) Times a Day., Disp: , Rfl: 2  •  hydrocortisone 2.5 % cream, , Disp: , Rfl:   •  insulin NPH (NOVOLIN N RELION) 100 UNIT/ML injection, INJECT 25 UNITS SUBCUTANEOUSLY BEFORE BREAKFAST AND 25 BEFORE  BEDTIME, Disp: 50 mL, Rfl: 3  •  Insulin Pen Needle (PEN NEEDLES) 29G X 12MM misc, Inject 1 each under the skin 4 (Four) Times a Day. Use with Insulin, Disp: 400 each, Rfl: 1  •  insulin regular (NOVOLIN R RELION) 100 UNIT/ML injection, INJECT 10 UNITS SUBCUTANEOUSLY BEFORE BREAKFAST AND 8 AT DINNER, Disp: 10 mL, Rfl: 3  •  Insulin Syringe (RELION INSULIN SYRINGE) 29G X 1/2\" 1 ML misc, 4 times daily sc inj, Disp: 200 each, Rfl: 3  •  Lancets (ACCU-CHEK MULTICLIX) lancets, USE TO TEST BLOOD SUGAR 4 TIMES DAILY  E " "11.8, Disp: 200 each, Rfl: 6  •  Multiple Vitamin (MULTI VITAMIN DAILY PO), Take  by mouth., Disp: , Rfl:   •  RELION INSULIN SYRINGE 1ML/31G 31G X 5/16\" 1 ML misc, USE 1 SYRINGE 4 TIMES DAILY, Disp: 200 each, Rfl: 1  •  tamsulosin (FLOMAX) 0.4 MG capsule 24 hr capsule, Take 1 capsule by mouth Every Night., Disp: 90 capsule, Rfl: 1        Review of Systems   Constitutional: Negative for chills, fatigue and fever.   Cardiovascular: Negative for chest pain and palpitations.   Gastrointestinal: Negative for abdominal pain, constipation, diarrhea, nausea and vomiting.   Endocrine: Negative for cold intolerance and heat intolerance.   All other systems reviewed and are negative.      Objective       Vitals:    01/23/20 1215   BP: 118/78   Pulse: 64   Weight: 123 kg (271 lb)   Height: 177.8 cm (70\")     Body mass index is 38.88 kg/m².      Physical Exam   Constitutional: He is oriented to person, place, and time. He appears well-developed and well-nourished.   HENT:   Head: Normocephalic and atraumatic.   Eyes: Pupils are equal, round, and reactive to light. EOM are normal.   Neck: Normal range of motion. Neck supple. No thyromegaly present.   Cardiovascular: Normal rate, regular rhythm, normal heart sounds and intact distal pulses.   Pulmonary/Chest: Effort normal and breath sounds normal.   Abdominal: Soft. Bowel sounds are normal. He exhibits distension. There is no tenderness.   Lymphadenopathy:     He has no cervical adenopathy.   Neurological: He is alert and oriented to person, place, and time. He has normal reflexes.   Skin: Skin is warm and dry.   Psychiatric: He has a normal mood and affect. His behavior is normal.   Nursing note and vitals reviewed.    Results Review:     I reviewed the patient's new clinical results.    Medical records reviewed  Summary:      Admission on 01/08/2020, Discharged on 01/08/2020   Component Date Value Ref Range Status   • Glucose 01/08/2020 116  70 - 130 mg/dL Final   • Case " Report 01/08/2020    Final                    Value:Surgical Pathology Report                         Case: [id number]                                  Authorizing Provider:  Tyler Warner MD        Collected:           01/08/2020 11:26 AM          Ordering Location:     [facility]  Received:            01/08/2020 01:27 PM                                 ENDO SUITES                                                                  Pathologist:           Fernandez Cassidy MD                                                         Specimens:   1) - Large Intestine, Cecum, Cecal Polyp                                                            2) - Large Intestine, Transverse Colon, Polyps x 2                                        • Final Diagnosis 01/08/2020    Final                    Value:This result contains rich text formatting which cannot be displayed here.   • Gross Description 01/08/2020    Final                    Value:This result contains rich text formatting which cannot be displayed here.   • Microscopic Description 01/08/2020    Final                    Value:This result contains rich text formatting which cannot be displayed here.     Lab Results   Component Value Date    HGBA1C 9.1 (H) 01/23/2020    HGBA1C 9.20 (H) 10/03/2019    HGBA1C 8.50 (H) 06/18/2019     Lab Results   Component Value Date    MICROALBUR 84.1 01/23/2020    CREATININE 1.29 (H) 01/23/2020     Imaging Results (Most Recent)     None                Assessment and Plan:    Claude was seen today for diabetes.    Diagnoses and all orders for this visit:    Uncontrolled type 2 diabetes mellitus with complication, with long-term current use of insulin (CMS/[facility])  -     Comprehensive Metabolic Panel  -     Hemoglobin A1c  -     TSH  -     Microalbumin / Creatinine Urine Ratio - Urine, Clean Catch    Uncontrolled type II diabetes mellitus with nephropathy (CMS/[facility])    Uncontrolled type 2 diabetes mellitus with diabetic  "neuropathy, with long-term current use of insulin (CMS/[facility])    Hyperinsulinism    Encounter for long-term (current) use of insulin (CMS/[facility])    CKD stage 3 due to type 2 diabetes mellitus (CMS/[facility])    Abnormal weight gain    Other orders  -     [facility] CKD Program  -     Diabetes Patient Education  -     insulin regular (NOVOLIN R RELION) 100 UNIT/ML injection; INJECT 10 UNITS SUBCUTANEOUSLY BEFORE BREAKFAST AND 8 AT DINNER    Patient's glucose log reviewed  7-day average is 163  90-day average is 165  Patient has appointment with ophthalmologist in February 2020  Patient reports that he is currently eating oatmeal plus ask for breakfast and eats late lunch of salmon and broccoli  He is eating only 2 meals daily  He relates that his blood sugars are improving  Patient's hemoglobin A1c is 9.2% previously  I advised him to continue the current plan  Patient will get lab testing done today  Patient return to follow-up in 3 months      Layton Jolly MD. FACE    01/25/20      EMR Dragon / transcription disclaimer:     \"Dictated utilizing Dragon dictation\".         "

## 2020-01-24 LAB
ALBUMIN SERPL-MCNC: 4.2 G/DL (ref 3.7–4.7)
ALBUMIN/CREAT UR: 128 MG/G CREAT (ref 0–29)
ALBUMIN/GLOB SERPL: 1.7 {RATIO} (ref 1.2–2.2)
ALP SERPL-CCNC: 110 IU/L (ref 39–117)
ALT SERPL-CCNC: 18 IU/L (ref 0–44)
AST SERPL-CCNC: 18 IU/L (ref 0–40)
BILIRUB SERPL-MCNC: 0.9 MG/DL (ref 0–1.2)
BUN SERPL-MCNC: 22 MG/DL (ref 8–27)
BUN/CREAT SERPL: 17 (ref 10–24)
CALCIUM SERPL-MCNC: 9.2 MG/DL (ref 8.6–10.2)
CHLORIDE SERPL-SCNC: 103 MMOL/L (ref 96–106)
CO2 SERPL-SCNC: 21 MMOL/L (ref 20–29)
CREAT SERPL-MCNC: 1.29 MG/DL (ref 0.76–1.27)
CREAT UR-MCNC: 65.9 MG/DL
GLOBULIN SER CALC-MCNC: 2.5 G/DL (ref 1.5–4.5)
GLUCOSE SERPL-MCNC: 176 MG/DL (ref 65–99)
HBA1C MFR BLD: 9.1 % (ref 4.8–5.6)
INTERPRETATION: NORMAL
Lab: NORMAL
MICROALBUMIN UR-MCNC: 84.1 UG/ML
POTASSIUM SERPL-SCNC: 4.6 MMOL/L (ref 3.5–5.2)
PROT SERPL-MCNC: 6.7 G/DL (ref 6–8.5)
SODIUM SERPL-SCNC: 142 MMOL/L (ref 134–144)
TSH SERPL DL<=0.005 MIU/L-ACNC: 3.23 UIU/ML (ref 0.45–4.5)

## 2020-01-25 PROBLEM — E11.22 CKD STAGE 3 DUE TO TYPE 2 DIABETES MELLITUS (HCC): Status: ACTIVE | Noted: 2020-01-25

## 2020-01-25 PROBLEM — N18.30 CKD STAGE 3 DUE TO TYPE 2 DIABETES MELLITUS (HCC): Status: ACTIVE | Noted: 2020-01-25

## 2020-07-14 ENCOUNTER — OFFICE VISIT (OUTPATIENT)
Dept: ENDOCRINOLOGY | Age: 80
End: 2020-07-14

## 2020-07-14 VITALS
DIASTOLIC BLOOD PRESSURE: 64 MMHG | RESPIRATION RATE: 20 BRPM | HEIGHT: 70 IN | SYSTOLIC BLOOD PRESSURE: 138 MMHG | WEIGHT: 280 LBS | OXYGEN SATURATION: 97 % | BODY MASS INDEX: 40.09 KG/M2 | HEART RATE: 78 BPM

## 2020-07-14 DIAGNOSIS — E16.1 HYPERINSULINISM: ICD-10-CM

## 2020-07-14 DIAGNOSIS — IMO0002 UNCONTROLLED TYPE 2 DIABETES MELLITUS WITH COMPLICATION, WITH LONG-TERM CURRENT USE OF INSULIN: Primary | ICD-10-CM

## 2020-07-14 DIAGNOSIS — Z79.4 ENCOUNTER FOR LONG-TERM (CURRENT) USE OF INSULIN (HCC): ICD-10-CM

## 2020-07-14 DIAGNOSIS — E11.22 CKD STAGE 3 DUE TO TYPE 2 DIABETES MELLITUS (HCC): ICD-10-CM

## 2020-07-14 DIAGNOSIS — IMO0002 UNCONTROLLED TYPE 2 DIABETES MELLITUS WITH DIABETIC NEUROPATHY, WITH LONG-TERM CURRENT USE OF INSULIN: ICD-10-CM

## 2020-07-14 DIAGNOSIS — N18.30 CKD STAGE 3 DUE TO TYPE 2 DIABETES MELLITUS (HCC): ICD-10-CM

## 2020-07-14 DIAGNOSIS — IMO0002 UNCONTROLLED TYPE II DIABETES MELLITUS WITH NEPHROPATHY: ICD-10-CM

## 2020-07-14 PROCEDURE — 99214 OFFICE O/P EST MOD 30 MIN: CPT | Performed by: INTERNAL MEDICINE

## 2020-07-14 NOTE — PROGRESS NOTES
79 y.o.    Patient Care Team:  hSanice Cummings APRN as PCP - General (Internal Medicine)  Shanice Cummings APRN as PCP - Claims Attributed  [facility] MD as Consulting Physician (Cardiology)  Jordy Rene MD as Consulting Physician (Nephrology)    Chief Complaint:  Pt here for fup on labs and type 2 diabetes uncontrolled med eval refills checks bs 3-4 times daily avg bs 160-170  Subjective     HPI   Patient is 79-year-old white male with a history of uncontrolled type 2 diabetes mellitus with complications came for follow-up    Uncontrolled type 2 diabetes mellitus  Patient is currently taking NPH insulin 28 units twice daily and regular insulin twice daily  He takes 12 units before breakfast and 8 units before dinner of the regular insulin  [facility] NPH to 30 units twice daily  Hypoglycemia  Patient sometimes feels blood sugars are in the 90 range around 3 PM in the afternoon  He does not eat lunch frequently  Uncontrolled type 2 diabetes mellitus neuropathy  Patient reports symptoms are tingling numbness and burning in both feet  Uncontrolled type 2 diabetes mellitus nephropathy  Patient has elevated creatinine with chronic kidney disease  He has follow-up with a nephrologist  Patient denies any knowledge of diabetic retinopathy      Interval History      The following portions of the patient's history were reviewed and updated as appropriate: allergies, current medications, past family history, past medical history, past social history, past surgical history and problem list.    Past Medical History:   Diagnosis Date   • CKD (chronic kidney disease)    • Coronary artery disease    • Hyperlipidemia    • Hypertension    • Type 2 diabetes mellitus (CMS/HCC)      Family History   Problem Relation Age of Onset   • Cancer Mother    • Heart disease Father      Social History     Socioeconomic History   • Marital status:      Spouse name:  "Not on file   • Number of children: Not on file   • Years of education: Not on file   • Highest education level: Not on file   Tobacco Use   • Smoking status: Former Smoker   • Smokeless tobacco: Never Used   Substance and Sexual Activity   • Alcohol use: No   • Drug use: No   • Sexual activity: Defer     Allergies   Allergen Reactions   • Morphine Hives   • Morphine And Related    • Nabumetone Rash       Current Outpatient Medications:   •  ACCU-CHEK JEFF test strip, USE TO CHECK BLOOD SUGAR 4 TIMES DAILY E 11.8, Disp: 200 each, Rfl: 6  •  ACCU-CHEK SOFTCLIX LANCETS lancets, 1 each by Other route 4 (Four) Times a Day. Use as instructed, Disp: 400 each, Rfl: 3  •  amLODIPine (NORVASC) 10 MG tablet, 1/2 tablet twice daily, Disp: 30 tablet, Rfl: 5  •  aspirin 325 MG tablet, Take  by mouth daily., Disp: , Rfl:   •  atorvastatin (LIPITOR) 40 MG tablet, TAKE 1 TABLET DAILY, Disp: 90 tablet, Rfl: 3  •  Blood Glucose Monitoring Suppl (ACCU-CHEK JEFF PLUS) w/Device kit, USE TO CHECK BLOOD SUGAR 4 TIMES DAILY  E 11.8, Disp: 1 kit, Rfl: 0  •  fluticasone (FLONASE) 50 MCG/ACT nasal spray, 2 sprays by Each Nare route Daily., Disp: 3 bottle, Rfl: 3  •  furosemide (LASIX) 20 MG tablet, Take 20 mg by mouth Daily., Disp: , Rfl:   •  hydrALAZINE (APRESOLINE) 25 MG tablet, Take 25 mg by mouth 3 (Three) Times a Day., Disp: , Rfl: 2  •  insulin NPH (NOVOLIN N RELION) 100 UNIT/ML injection, INJECT 25 UNITS SUBCUTANEOUSLY BEFORE BREAKFAST AND 25 BEFORE  BEDTIME (Patient taking differently: 30 Units. INJECT 30. UNITS SUBCUTANEOUSLY BEFORE BREAKFAST AND  30 BEFORE  BEDTIME), Disp: 50 mL, Rfl: 3  •  Insulin Pen Needle (PEN NEEDLES) 29G X 12MM misc, Inject 1 each under the skin 4 (Four) Times a Day. Use with Insulin, Disp: 400 each, Rfl: 1  •  Insulin Syringe (RELION INSULIN SYRINGE) 29G X 1/2\" 1 ML misc, 4 times daily sc inj, Disp: 200 each, Rfl: 3  •  Lancets (ACCU-CHEK MULTICLIX) lancets, USE TO TEST BLOOD SUGAR 4 TIMES DAILY  E 11.8, " "Disp: 200 each, Rfl: 6  •  Multiple Vitamin (MULTI VITAMIN DAILY PO), Take  by mouth., Disp: , Rfl:   •  RELION INSULIN SYRINGE 1ML/31G 31G X 5/16\" 1 ML misc, USE 1 SYRINGE 4 TIMES DAILY, Disp: 200 each, Rfl: 1  •  tamsulosin (FLOMAX) 0.4 MG capsule 24 hr capsule, Take 1 capsule by mouth Every Night., Disp: 90 capsule, Rfl: 1  •  hydrocortisone 2.5 % cream, , Disp: , Rfl:   •  insulin regular (NOVOLIN R RELION) 100 UNIT/ML injection, INJECT 10 UNITS SUBCUTANEOUSLY BEFORE BREAKFAST AND 8 AT DINNER (Patient taking differently: Inject 12 Units under the skin into the appropriate area as directed. INJECT 12 UNITS SUBCUTANEOUSLY BEFORE BREAKFAST AND 8 AT DINNER), Disp: 10 mL, Rfl: 3        Review of Systems   Constitutional: Positive for fatigue and unexpected weight change.   HENT: Negative.    Eyes: Negative.    Respiratory: Negative.    Cardiovascular: Negative.    Gastrointestinal: Negative.    Endocrine: Negative.    Genitourinary: Negative.    Musculoskeletal: Negative.    Skin: Negative.    Allergic/Immunologic: Negative.    Neurological: Positive for dizziness.   Hematological: Negative.    Psychiatric/Behavioral: Negative.        Objective       Vitals:    07/14/20 1444   BP: 138/64   Pulse: 78   Resp: 20   SpO2: 97%   Weight: 127 kg (280 lb)   Height: 177.8 cm (70\")     Body mass index is 40.18 kg/m².      Physical Exam  Results Review:     I reviewed the patient's new clinical results.    Medical records reviewed  Summary:      Office Visit on 01/23/2020   Component Date Value Ref Range Status   • Glucose 01/23/2020 176* 65 - 99 mg/dL Final   • BUN 01/23/2020 22  8 - 27 mg/dL Final   • Creatinine 01/23/2020 1.29* 0.76 - 1.27 mg/dL Final   • eGFR Non African Am 01/23/2020 52* >59 mL/min/1.73 Final   • eGFR African Am 01/23/2020 60  >59 mL/min/1.73 Final   • BUN/Creatinine Ratio 01/23/2020 17  10 - 24 Final   • Sodium 01/23/2020 142  134 - 144 mmol/L Final   • Potassium 01/23/2020 4.6  3.5 - 5.2 mmol/L Final   • " Chloride 01/23/2020 103  96 - 106 mmol/L Final   • Total CO2 01/23/2020 21  20 - 29 mmol/L Final   • Calcium 01/23/2020 9.2  8.6 - 10.2 mg/dL Final   • Total Protein 01/23/2020 6.7  6.0 - 8.5 g/dL Final   • Albumin 01/23/2020 4.2  3.7 - 4.7 g/dL Final                  **Please note reference interval change**   • Globulin 01/23/2020 2.5  1.5 - 4.5 g/dL Final   • A/G Ratio 01/23/2020 1.7  1.2 - 2.2 Final   • Total Bilirubin 01/23/2020 0.9  0.0 - 1.2 mg/dL Final   • Alkaline Phosphatase 01/23/2020 110  39 - 117 IU/L Final   • AST (SGOT) 01/23/2020 18  0 - 40 IU/L Final   • ALT (SGPT) 01/23/2020 18  0 - 44 IU/L Final   • Hemoglobin A1C 01/23/2020 9.1* 4.8 - 5.6 % Final    Comment:          Prediabetes: 5.7 - 6.4           Diabetes: >6.4           Glycemic control for adults with diabetes: <7.0     • TSH 01/23/2020 3.230  0.450 - 4.500 uIU/mL Final   • Creatinine, Urine 01/23/2020 65.9  Not Estab. mg/dL Final   • Microalbumin, Urine 01/23/2020 84.1  Not Estab. ug/mL Final   • Microalbumin/Creatinine Ratio 01/23/2020 128* 0 - 29 mg/g creat Final    Comment:                        Normal:                0 -  29                         Moderately increased: 30 - 300                         Severely Increased:       >300                **Please note reference interval change**     • Interpretation 01/23/2020 Note   Final    Comment: -------------------------------  CHRONIC KIDNEY DISEASE:  EGFR, BLOOD PRESSURE, AND PROTEINURIA ASSESSMENT  The overall regression of eGFR with time is not  statistically significant. Current eGFR is 52 mL/min/1.73mE2  corresponding to CKD stage 3a. Multiply eGFR by 1.159 if  patient is . Potassium is within goal and  has not changed significantly, was 4.8 and now is 4.6  mmol/L. Albuminuria is present and has decreased, was 150.4  and now is 127.6 mg/g creat. Glycemic control (HB A1c: 9.1  EGFR, BLOOD PRESSURE, AND PROTEINURIA TREATMENT SUGGESTIONS  -  Based upon current eGFR  and presence of moderate  proteinuria, patient is at high risk for adverse outcomes  such as CKD progression, CVD, and mortality. Given patient's  advanced age, we are unable to provide specific blood  pressure treatment suggestions. Individualize blood pressure  goals based on the patient's comorbidities and to avoid  orthostatic hypotension and other medication side effects.  Weight loss and opt                           imal glycemic control (in diabetes) are  helpful in reducing proteinuria.  EGFR, BLOOD PRESSURE, AND PROTEINURIA FOLLOW-UP  -  Spot Urine Panel (Protein preferred) within 6 months;  Hemoglobin A1C and fasting Renal Panel within 3 months;  -  BONE and MINERAL ASSESSMENT  Calcium is within goal and has not changed significantly,  was 9.5 and now is 9.2 mg/dL. Carbon Dioxide is below goal  and has not changed significantly, was 21 and now is 21  mmol/L. Vitamin D insufficiency is present (27.0 ng/mL  1/20/2020) .  BONE and MINERAL TREATMENT SUGGESTIONS  -  Interpretations require simultaneous measurements of serum  calcium and phosphorus. Begin vitamin D (ergocalciferol  50,000 IU/month orally for 6 months; alternatively,  cholecalciferol 5402-2096 IU/d for 6 months). If not on  alkali, begin sodium bicarbonate, one 650 mg pill 2-3 times  daily, otherwise increase dose.  BONE and MINERAL FOLLOW-UP  -  25-Hydroxy Vitamin D and fasting PTH with Renal Panel within  6 months; fasting Renal Pa                           nash within 3 months;  -  LIPIDS ASSESSMENT  Most recent order does not include a fasting Lipid Panel.  LIPIDS FOLLOW-UP  -  fasting Lipid Panel is recommended by KDOQI guidelines, at  least yearly;  -  ANEMIA ASSESSMENT  Most recent order does not include a CBC Panel or iron  studies.  ANEMIA FOLLOW-UP  -  CBC within 12 months;  -------------------------------  DISCLAIMER  These assessments and treatment suggestions are provided as  a convenience in support of the  physician-patient  relationship and are not intended to replace the physician's  clinical judgment. They are derived from national guidelines  in addition to other evidence and expert opinion. The  clinician should consider this information within the  context of clinical opinion and the individual patient.  SEE GUIDANCE FOR CHRONIC KIDNEY DISEASE PROGRAM: Kidney  Disease Improving Global Outcomes (KDIGO) clinical practice  guidelines are at http://kdigo.org/home/guidelines/.  National Kid[employer] Foundation Kidney Disease Outcome                           s Quality  Initiative (KDOQI (TM)), with its limitations and  disclaimers, are at www.kidney.org/professionals/KDOQI. This  program is intended for patients who have been diagnosed  with stages 3, 4, or pre-dialysis 5 CKD. It is not intended  for children, pregnant patients, or transplant patients.     • PDF Image 01/23/2020 Not applicable   Final     Lab Results   Component Value Date    HGBA1C 9.1 (H) 01/23/2020    HGBA1C 9.20 (H) 10/03/2019    HGBA1C 8.50 (H) 06/18/2019     Lab Results   Component Value Date    MICROALBUR 84.1 01/23/2020    CREATININE 1.29 (H) 01/23/2020     Imaging Results (Most Recent)     None                Assessment and Plan:    Claude was seen today for diabetes.    Diagnoses and all orders for this visit:    Uncontrolled type 2 diabetes mellitus with complication, with long-term current use of insulin (CMS/[facility])  -     Hemoglobin A1c  -     Comprehensive Metabolic Panel  -     TSH    Uncontrolled type II diabetes mellitus with nephropathy (CMS/HCC)    Uncontrolled type 2 diabetes mellitus with diabetic neuropathy, with long-term current use of insulin (CMS/[facility])    Hyperinsulinism    Encounter for long-term (current) use of insulin (CMS/[facility])    CKD stage 3 due to type 2 diabetes mellitus (CMS/[facility])        Patient's glucose log reviewed  Ranging from   Fasting blood sugars are slightly higher  Patient wants to increase NPH 30 units twice  "daily as per pharmacist at [facility]  I agree with that  He will continue regular insulin 12 units before breakfast and 8 units before dinner  I advised him to eat a very small snack at lunch to support the NPH dose in the morning  Patient understands    Patient will get lab testing done today  He will return to follow-up in 3 months  Layton Jolly MD. FACE    07/14/20      EMR Dragon / transcription disclaimer:     \"Dictated utilizing Dragon dictation\".         "

## 2020-07-15 LAB
ALBUMIN SERPL-MCNC: 4.6 G/DL (ref 3.5–5.2)
ALBUMIN/GLOB SERPL: 2.6 G/DL
ALP SERPL-CCNC: 103 U/L (ref 39–117)
ALT SERPL-CCNC: 16 U/L (ref 1–41)
AST SERPL-CCNC: 16 U/L (ref 1–40)
BILIRUB SERPL-MCNC: 0.9 MG/DL (ref 0–1.2)
BUN SERPL-MCNC: 25 MG/DL (ref 8–23)
BUN/CREAT SERPL: 15.8 (ref 7–25)
CALCIUM SERPL-MCNC: 9.1 MG/DL (ref 8.6–10.5)
CHLORIDE SERPL-SCNC: 103 MMOL/L (ref 98–107)
CO2 SERPL-SCNC: 22.5 MMOL/L (ref 22–29)
CREAT SERPL-MCNC: 1.58 MG/DL (ref 0.76–1.27)
GLOBULIN SER CALC-MCNC: 1.8 GM/DL
GLUCOSE SERPL-MCNC: 91 MG/DL (ref 65–99)
HBA1C MFR BLD: 8.5 % (ref 4.8–5.6)
POTASSIUM SERPL-SCNC: 4 MMOL/L (ref 3.5–5.2)
PROT SERPL-MCNC: 6.4 G/DL (ref 6–8.5)
SODIUM SERPL-SCNC: 140 MMOL/L (ref 136–145)
TSH SERPL DL<=0.005 MIU/L-ACNC: 2.84 UIU/ML (ref 0.27–4.2)

## 2020-08-05 ENCOUNTER — PROCEDURE VISIT CONVERTED (OUTPATIENT)
Dept: PODIATRY | Facility: CLINIC | Age: 80
End: 2020-08-05
Attending: PODIATRIST

## 2020-10-07 ENCOUNTER — PROCEDURE VISIT CONVERTED (OUTPATIENT)
Dept: PODIATRY | Facility: CLINIC | Age: 80
End: 2020-10-07
Attending: PODIATRIST

## 2020-10-07 NOTE — PROGRESS NOTES
Subjective   Claude E Johnson is a 80 y.o. male.     F/u from dr Petersen for for dm 2, CKD/ testing bs 4 x day / [facility]   / last dm foot exam today with dr Kelley / flu and pneumo @[facility]      Patient is an 80-year-old male who came in for follow-up.  He is new to me.    He has known diabetes mellitus since 1997 and started on insulin in 2000.  He is on Novolin N 32 units every morning and 32 units at supper and Novolin R 12 units at breakfast and 8 units at supper.  He checks his blood sugar 3-4 times a day.  Fasting glucose 102-177.  Lunchtime glucose 108-254.  Suppertime glucose 132-252.  He denies hypoglycemic episodes.  His last meal was last night    [facility].  He has no retinopathy.  He denies numbness, tingling or burning in his hands or feet.  Urine microalbumin is elevated and January 2020.  He has CKD stage III and follows with Dr. Rene.    He has hyperlipidemia is on Lipitor 40 mg/day.  He denies myalgia.  He has no significant weight change over the past 6 months.    He has hypertension, abdominal aortic aneurysm and coronary artery disease.  He has had previous coronary artery bypass surgery and angioplasty with stents.  He is on amlodipine and hydralazine.  He denies chest pain, pedal edema or shortness of breath.  He follows with Dr. Saucedo.      The following portions of the patient's history were reviewed and updated as appropriate: allergies, current medications, past family history, past medical history, past social history, past surgical history and problem list.    Review of Systems   Constitutional: Negative.  Negative for chills, fatigue and fever.   HENT: Negative.    Eyes: Negative.    Respiratory: Negative.  Negative for chest tightness, shortness of breath and wheezing.    Cardiovascular: Negative.  Negative for chest pain, palpitations and leg swelling.   Gastrointestinal: Negative.  Negative for abdominal distention,  "abdominal pain, constipation and diarrhea.   Endocrine: Negative.  Negative for cold intolerance, heat intolerance and polyuria.   Genitourinary: Negative.  Negative for difficulty urinating, frequency and urgency.   Musculoskeletal: Negative.  Negative for back pain, joint swelling, myalgias and neck pain.   Neurological: Negative.  Negative for dizziness, tremors, seizures, light-headedness, numbness and headaches.   Hematological: Does not bruise/bleed easily (aspirin ).   Psychiatric/Behavioral: Negative for agitation, confusion and sleep disturbance. The patient is not nervous/anxious.      Objective      Vitals:    10/22/20 0954   BP: 110/72   BP Location: Right arm   Patient Position: Sitting   Cuff Size: Large Adult   Pulse: 68   SpO2: 98%   Weight: 127 kg (280 lb 12.8 oz)   Height: 177.8 cm (70\")     Physical Exam  Constitutional:       General: He is not in acute distress.     Appearance: Normal appearance. He is obese. He is not ill-appearing, toxic-appearing or diaphoretic.   Eyes:      General: No scleral icterus.        Right eye: No discharge.         Left eye: No discharge.      Extraocular Movements: Extraocular movements intact.      Conjunctiva/sclera: Conjunctivae normal.   Neck:      Musculoskeletal: Normal range of motion. No neck rigidity or muscular tenderness.      Vascular: No carotid bruit.   Cardiovascular:      Rate and Rhythm: Normal rate and regular rhythm.      Heart sounds: Normal heart sounds. No murmur. No friction rub. No gallop.    Pulmonary:      Effort: No respiratory distress.      Breath sounds: Normal breath sounds. No stridor. No wheezing, rhonchi or rales.   Chest:      Chest wall: No tenderness.   Abdominal:      General: Bowel sounds are normal. There is no distension.      Palpations: Abdomen is soft. There is no mass.      Tenderness: There is no abdominal tenderness. There is no right CVA tenderness, left CVA tenderness or guarding.      Hernia: No hernia is present. "   Musculoskeletal: Normal range of motion.         General: No swelling, tenderness, deformity or signs of injury.      Right lower leg: No edema.      Left lower leg: No edema.      Comments: Callus on right first metatarsal head area.  No plantar ulcers   Lymphadenopathy:      Cervical: No cervical adenopathy.   Skin:     General: Skin is warm and dry.      Coloration: Skin is not jaundiced or pale.      Findings: No bruising.   Neurological:      General: No focal deficit present.      Mental Status: He is alert and oriented to person, place, and time.      Comments: Intact light touch in lower extremities   Psychiatric:         Mood and Affect: Mood normal.         Behavior: Behavior normal.       Office Visit on 07/14/2020   Component Date Value Ref Range Status   • Hemoglobin A1C 07/14/2020 8.50* 4.80 - 5.60 % Final    Comment: Hemoglobin A1C Ranges:  Increased Risk for Diabetes  5.7% to 6.4%  Diabetes                     >= 6.5%  Diabetic Goal                < 7.0%     • Glucose 07/14/2020 91  65 - 99 mg/dL Final   • BUN 07/14/2020 25* 8 - 23 mg/dL Final   • Creatinine 07/14/2020 1.58* 0.76 - 1.27 mg/dL Final   • eGFR Non African Am 07/14/2020 42* >60 mL/min/1.73 Final    Comment: The MDRD GFR formula is only valid for adults with stable  renal function between ages 18 and 70.     • eGFR  Am 07/14/2020 51* >60 mL/min/1.73 Final   • BUN/Creatinine Ratio 07/14/2020 15.8  7.0 - 25.0 Final   • Sodium 07/14/2020 140  136 - 145 mmol/L Final   • Potassium 07/14/2020 4.0  3.5 - 5.2 mmol/L Final   • Chloride 07/14/2020 103  98 - 107 mmol/L Final   • Total CO2 07/14/2020 22.5  22.0 - 29.0 mmol/L Final   • Calcium 07/14/2020 9.1  8.6 - 10.5 mg/dL Final   • Total Protein 07/14/2020 6.4  6.0 - 8.5 g/dL Final   • Albumin 07/14/2020 4.60  3.50 - 5.20 g/dL Final   • Globulin 07/14/2020 1.8  gm/dL Final   • A/G Ratio 07/14/2020 2.6  g/dL Final   • Total Bilirubin 07/14/2020 0.9  0.0 - 1.2 mg/dL Final   • Alkaline  Phosphatase 07/14/2020 103  39 - 117 U/L Final   • AST (SGOT) 07/14/2020 16  1 - 40 U/L Final   • ALT (SGPT) 07/14/2020 16  1 - 41 U/L Final   • TSH 07/14/2020 2.840  0.270 - 4.200 uIU/mL Final     Assessment/Plan   Diagnoses and all orders for this visit:    1. Type 2 diabetes with nephropathy (CMS/HCC) (Primary)  -     Comprehensive Metabolic Panel  -     Hemoglobin A1c  -     TSH  -     T4, Free    2. Essential hypertension  -     Comprehensive Metabolic Panel    3. Chronic coronary artery disease  -     Comprehensive Metabolic Panel  -     Lipid Panel    4. Abdominal aortic aneurysm (AAA) without rupture (CMS/HCC)  -     Comprehensive Metabolic Panel    5. Other hyperlipidemia  -     Comprehensive Metabolic Panel  -     Lipid Panel  -     TSH  -     T4, Free      Continue Novolin N and Novolin R.  Continue Lipitor 40 mg/day  Continue amlodipine and hydralazine.    Copy of my note sent to Shanice Cummings NP, Dr. Rene, and Dr. León    RTC 4 mos

## 2020-10-22 ENCOUNTER — OFFICE VISIT (OUTPATIENT)
Dept: ENDOCRINOLOGY | Age: 80
End: 2020-10-22

## 2020-10-22 VITALS
WEIGHT: 280.8 LBS | HEART RATE: 68 BPM | OXYGEN SATURATION: 98 % | BODY MASS INDEX: 40.2 KG/M2 | DIASTOLIC BLOOD PRESSURE: 72 MMHG | HEIGHT: 70 IN | SYSTOLIC BLOOD PRESSURE: 110 MMHG

## 2020-10-22 DIAGNOSIS — E78.49 OTHER HYPERLIPIDEMIA: ICD-10-CM

## 2020-10-22 DIAGNOSIS — I25.10 CHRONIC CORONARY ARTERY DISEASE: ICD-10-CM

## 2020-10-22 DIAGNOSIS — I71.40 ABDOMINAL AORTIC ANEURYSM (AAA) WITHOUT RUPTURE (HCC): ICD-10-CM

## 2020-10-22 DIAGNOSIS — E11.21 TYPE 2 DIABETES WITH NEPHROPATHY (HCC): Primary | ICD-10-CM

## 2020-10-22 DIAGNOSIS — I10 ESSENTIAL HYPERTENSION: ICD-10-CM

## 2020-10-22 PROBLEM — E16.1 HYPERINSULINISM: Status: RESOLVED | Noted: 2018-07-13 | Resolved: 2020-10-22

## 2020-10-22 PROCEDURE — 99214 OFFICE O/P EST MOD 30 MIN: CPT | Performed by: INTERNAL MEDICINE

## 2020-10-22 RX ORDER — INFLUENZA A VIRUS A/MICHIGAN/45/2015 X-275 (H1N1) ANTIGEN (FORMALDEHYDE INACTIVATED), INFLUENZA A VIRUS A/SINGAPORE/INFIMH-16-0019/2016 IVR-186 (H3N2) ANTIGEN (FORMALDEHYDE INACTIVATED), INFLUENZA B VIRUS B/PHUKET/3073/2013 ANTIGEN (FORMALDEHYDE INACTIVATED), AND INFLUENZA B VIRUS B/MARYLAND/15/2016 BX-69A ANTIGEN (FORMALDEHYDE INACTIVATED) 60; 60; 60; 60 UG/.7ML; UG/.7ML; UG/.7ML; UG/.7ML
INJECTION, SUSPENSION INTRAMUSCULAR
COMMUNITY
Start: 2020-09-07 | End: 2020-11-04

## 2020-10-22 RX ORDER — ACETAMINOPHEN AND CODEINE PHOSPHATE 300; 30 MG/1; MG/1
TABLET ORAL SEE ADMIN INSTRUCTIONS
COMMUNITY
Start: 2020-08-10 | End: 2020-10-22

## 2020-10-23 LAB
ALBUMIN SERPL-MCNC: 4.2 G/DL (ref 3.5–5.2)
ALBUMIN/GLOB SERPL: 1.8 G/DL
ALP SERPL-CCNC: 123 U/L (ref 39–117)
ALT SERPL-CCNC: 19 U/L (ref 1–41)
AST SERPL-CCNC: 16 U/L (ref 1–40)
BILIRUB SERPL-MCNC: 0.9 MG/DL (ref 0–1.2)
BUN SERPL-MCNC: 20 MG/DL (ref 8–23)
BUN/CREAT SERPL: 15.7 (ref 7–25)
CALCIUM SERPL-MCNC: 9.3 MG/DL (ref 8.6–10.5)
CHLORIDE SERPL-SCNC: 104 MMOL/L (ref 98–107)
CHOLEST SERPL-MCNC: 137 MG/DL (ref 0–200)
CO2 SERPL-SCNC: 25.7 MMOL/L (ref 22–29)
CREAT SERPL-MCNC: 1.27 MG/DL (ref 0.76–1.27)
GLOBULIN SER CALC-MCNC: 2.3 GM/DL
GLUCOSE SERPL-MCNC: 143 MG/DL (ref 65–99)
HBA1C MFR BLD: 8.6 % (ref 4.8–5.6)
HDLC SERPL-MCNC: 40 MG/DL (ref 40–60)
INTERPRETATION: NORMAL
LDLC SERPL CALC-MCNC: 72 MG/DL (ref 0–100)
Lab: NORMAL
POTASSIUM SERPL-SCNC: 4.7 MMOL/L (ref 3.5–5.2)
PROT SERPL-MCNC: 6.5 G/DL (ref 6–8.5)
SODIUM SERPL-SCNC: 139 MMOL/L (ref 136–145)
T4 FREE SERPL-MCNC: 1.13 NG/DL (ref 0.93–1.7)
TRIGL SERPL-MCNC: 142 MG/DL (ref 0–150)
TSH SERPL DL<=0.005 MIU/L-ACNC: 3 UIU/ML (ref 0.27–4.2)
VLDLC SERPL CALC-MCNC: 25 MG/DL (ref 5–40)

## 2020-11-04 ENCOUNTER — OFFICE VISIT (OUTPATIENT)
Dept: INTERNAL MEDICINE | Facility: CLINIC | Age: 80
End: 2020-11-04

## 2020-11-04 VITALS
HEART RATE: 89 BPM | BODY MASS INDEX: 40.23 KG/M2 | DIASTOLIC BLOOD PRESSURE: 78 MMHG | SYSTOLIC BLOOD PRESSURE: 120 MMHG | TEMPERATURE: 97.9 F | WEIGHT: 281 LBS | HEIGHT: 70 IN | OXYGEN SATURATION: 99 %

## 2020-11-04 DIAGNOSIS — IMO0002 UNCONTROLLED TYPE II DIABETES MELLITUS WITH NEPHROPATHY: Primary | ICD-10-CM

## 2020-11-04 DIAGNOSIS — I10 HYPERTENSION, BENIGN: ICD-10-CM

## 2020-11-04 DIAGNOSIS — E78.00 HYPERCHOLESTEROLEMIA: ICD-10-CM

## 2020-11-04 PROCEDURE — 99213 OFFICE O/P EST LOW 20 MIN: CPT | Performed by: NURSE PRACTITIONER

## 2020-11-04 RX ORDER — HUMAN INSULIN 100 [IU]/ML
32 INJECTION, SUSPENSION SUBCUTANEOUS 2 TIMES DAILY
Qty: 50 ML | Refills: 3 | Status: SHIPPED | OUTPATIENT
Start: 2020-11-04

## 2020-11-08 NOTE — PROGRESS NOTES
Subjective   Claude E Johnson is a 80 y.o. male who presents for f/u regarding HTN, hyperlipidemia and DM2.    His recent labs from endocrinology showed an A1c of 8.6.  His NovoLog was adjusted.  However, upon further discussion he is actually [facility] who [facility].  He has [facility].  He is followed by nephrology for chronic kidney disease which has remained stable, creatinine 1.27 with recent labs.  He is also followed by cardiology with annual monitoring of AAA.    Hypertension  This is a chronic problem. The current episode started more than 1 year ago. The problem is unchanged. The problem is controlled. Pertinent negatives include no chest pain, headaches, palpitations, peripheral edema or shortness of breath. Current antihypertension treatment includes calcium channel blockers. The current treatment provides significant improvement. There are no compliance problems.    Hyperlipidemia  Pertinent negatives include no chest pain or shortness of breath.        The following portions of the patient's history were reviewed and updated as appropriate: allergies, current medications, past social history and problem list.    Past Medical History:   Diagnosis Date   • CKD (chronic kidney disease)    • Coronary artery disease    • Hyperlipidemia    • Hypertension    • Type 2 diabetes mellitus (CMS/[facility])          Current Outpatient Medications:   •  ACCU-CHEK JEFF test strip, USE TO CHECK BLOOD SUGAR 4 TIMES DAILY E 11.8, Disp: 200 each, Rfl: 6  •  ACCU-CHEK SOFTCLIX LANCETS lancets, 1 each by Other route 4 (Four) Times a Day. Use as instructed, Disp: 400 each, Rfl: 3  •  amLODIPine (NORVASC) 10 MG tablet, 1/2 tablet twice daily, Disp: 30 tablet, Rfl: 5  •  aspirin 325 MG tablet, Take  by mouth daily., Disp: , Rfl:   •  atorvastatin (LIPITOR) 40 MG tablet, TAKE 1 TABLET DAILY, Disp: 90 tablet, Rfl: 3  •  Blood Glucose Monitoring Suppl (ACCU-CHEK  "JEFF PLUS) w/Device kit, USE TO CHECK BLOOD SUGAR 4 TIMES DAILY  E 11.8, Disp: 1 kit, Rfl: 0  •  fluticasone (FLONASE) 50 MCG/ACT nasal spray, 2 sprays by Each Nare route Daily., Disp: 3 bottle, Rfl: 3  •  furosemide (LASIX) 20 MG tablet, Take 20 mg by mouth Daily., Disp: , Rfl:   •  hydrALAZINE (APRESOLINE) 25 MG tablet, Take 25 mg by mouth 3 (Three) Times a Day., Disp: , Rfl: 2  •  hydrocortisone 2.5 % cream, , Disp: , Rfl:   •  insulin NPH (NovoLIN N ReliOn) 100 UNIT/ML injection, Inject 32 Units under the skin into the appropriate area as directed 2 (two) times a day., Disp: 50 mL, Rfl: 3  •  Insulin Pen Needle (PEN NEEDLES) 29G X 12MM misc, Inject 1 each under the skin 4 (Four) Times a Day. Use with Insulin, Disp: 400 each, Rfl: 1  •  insulin regular (NOVOLIN R RELION) 100 UNIT/ML injection, INJECT 10 UNITS SUBCUTANEOUSLY BEFORE BREAKFAST AND 8 AT DINNER (Patient taking differently: Inject 12 Units under the skin into the appropriate area as directed. INJECT 12 UNITS SUBCUTANEOUSLY BEFORE BREAKFAST AND 8 AT DINNER), Disp: 10 mL, Rfl: 3  •  Insulin Syringe (RELION INSULIN SYRINGE) 29G X 1/2\" 1 ML misc, 4 times daily sc inj, Disp: 200 each, Rfl: 3  •  Lancets (ACCU-CHEK MULTICLIX) lancets, USE TO TEST BLOOD SUGAR 4 TIMES DAILY  E 11.8, Disp: 200 each, Rfl: 6  •  Multiple Vitamin (MULTI VITAMIN DAILY PO), Take  by mouth., Disp: , Rfl:   •  RELION INSULIN SYRINGE 1ML/31G 31G X 5/16\" 1 ML misc, USE 1 SYRINGE 4 TIMES DAILY, Disp: 200 each, Rfl: 1  •  tamsulosin (FLOMAX) 0.4 MG capsule 24 hr capsule, Take 1 capsule by mouth Every Night., Disp: 90 capsule, Rfl: 1    Allergies   Allergen Reactions   • Morphine Hives   • Morphine And Related Hives   • Nabumetone Rash       Review of Systems   Constitutional: Negative for chills, fatigue, fever and unexpected weight change.   HENT: Negative for congestion, ear pain, postnasal drip, sinus pressure, sore throat and trouble swallowing.    Eyes: Negative for visual " "disturbance.   Respiratory: Negative for cough, chest tightness, shortness of breath and wheezing.    Cardiovascular: Negative for chest pain, palpitations and leg swelling.   Gastrointestinal: Negative for abdominal pain, blood in stool, nausea and vomiting.   Genitourinary: Negative for dysuria, frequency and urgency.   Musculoskeletal: Negative for arthralgias and joint swelling.   Skin: Negative for color change.   Neurological: Negative for syncope, weakness and headaches.   Hematological: Does not bruise/bleed easily.       Objective   Vitals:    11/04/20 0859   BP: 120/78   BP Location: Left arm   Patient Position: Sitting   Cuff Size: Large Adult   Pulse: 89   Temp: 97.9 °F (36.6 °C)   TempSrc: Oral   SpO2: 99%   Weight: 127 kg (281 lb)   Height: 177.8 cm (70\")     Body mass index is 40.32 kg/m².  Physical Exam  Constitutional:       Appearance: He is well-developed. He is not ill-appearing.   HENT:      Head: Normocephalic.      Right Ear: Hearing, tympanic membrane and external ear normal.      Left Ear: Hearing, tympanic membrane and external ear normal.      Nose: Nose normal. No nasal deformity, mucosal edema or rhinorrhea.      Right Sinus: No maxillary sinus tenderness or frontal sinus tenderness.      Left Sinus: No maxillary sinus tenderness or frontal sinus tenderness.      Mouth/Throat:      Dentition: Normal dentition.   Eyes:      General: Lids are normal.         Right eye: No discharge.         Left eye: No discharge.      Conjunctiva/sclera: Conjunctivae normal.      Right eye: No exudate.     Left eye: No exudate.  Neck:      Musculoskeletal: Normal range of motion. No edema.      Thyroid: No thyroid mass or thyromegaly.      Vascular: No carotid bruit.      Trachea: Trachea normal.   Cardiovascular:      Rate and Rhythm: Regular rhythm.      Pulses: Normal pulses.      Heart sounds: Normal heart sounds. No murmur.   Pulmonary:      Effort: No respiratory distress.      Breath sounds: Normal " breath sounds. No decreased breath sounds, wheezing, rhonchi or rales.   Abdominal:      General: Bowel sounds are normal.      Palpations: Abdomen is soft.      Tenderness: There is no abdominal tenderness.   Lymphadenopathy:      Head:      Right side of head: No submental, submandibular, tonsillar, preauricular, posterior auricular or occipital adenopathy.      Left side of head: No submental, submandibular, tonsillar, preauricular, posterior auricular or occipital adenopathy.   Skin:     General: Skin is warm and dry.      Nails: There is no clubbing.     Neurological:      Mental Status: He is alert.   Psychiatric:         Behavior: Behavior is cooperative.         Assessment/Plan   Diagnoses and all orders for this visit:    1. Uncontrolled type II diabetes mellitus with nephropathy (CMS/[facility]) (Primary)  -     insulin NPH (NovoLIN N ReliOn) 100 UNIT/ML injection; Inject 32 Units under the skin into the appropriate area as directed 2 (two) times a day.  Dispense: 50 mL; Refill: 3    2. Hypertension, benign    3. Hypercholesterolemia    1. DM2-recent A1c 8.6.  [facility].  2.  HTN-BP is excellent in the office, continue current medications along with a low-sodium diet.  3. Hyperlipidemia-he is tolerating statin therapy, LDL 72 with recent labs.    [facility] which [facility].  He is working well with [facility] and I have encouraged him to continue under their care. He will with this office only as needed.

## 2020-12-30 ENCOUNTER — PROCEDURE VISIT CONVERTED (OUTPATIENT)
Dept: PODIATRY | Facility: CLINIC | Age: 80
End: 2020-12-30
Attending: PODIATRIST

## 2021-03-03 ENCOUNTER — PROCEDURE VISIT CONVERTED (OUTPATIENT)
Dept: PODIATRY | Facility: CLINIC | Age: 81
End: 2021-03-03
Attending: PODIATRIST

## 2021-05-05 ENCOUNTER — PROCEDURE VISIT CONVERTED (OUTPATIENT)
Dept: PODIATRY | Facility: CLINIC | Age: 81
End: 2021-05-05
Attending: PODIATRIST

## 2021-05-13 NOTE — PROGRESS NOTES
Progress Note      Patient Name: Claude Johnson   Patient ID: [id number]   Sex: Male   YOB: 1940    Primary Care Provider: Lopez Salas MD   Referring Provider: Lake Warner DPM    Visit Date: August 5, 2020    Provider: Lake Warner DPM   Location: [facility]   Location Address: [address] [place] [address]  [id number]   Location Phone: [phone]          Chief Complaint  · Routine Foot Care Visit  · Diabetic Foot Examination       History Of Present Illness  Claude E. Johnson complains of painful, elongated toenails which are thickened, yellowed, chalky, and cause pain with shoe gear and ambulation. He is also due today for his yearly diabetic foot examination. He currently controls his diabetes with oral medication.      New, Established, New Problem:  est  Location:  Toenails  Duration:   Greater than five years  Onset:  Gradual  Nature:  Sore with palpation.  Stable, worsening, improving:   Worsening  Aggravating factors:  Pain with shoe gear and ambulation.  Previous Treatment:  Debridement     Patient denies any fevers, chills, nausea, vomiting, shortness of breathe, nor any other constitutional signs nor symptoms.    Pt states their most recent blood glucose reading was 150.    Patient reports that no changes in their medications with their recent appointment with their primary care provider.       Past Medical History  Aneurysm; Arthritis; Bunion; Calcaneal spur of foot, left; Corns and callus; Foot pain, left; Foot pain, right; Heart attack; High blood pressure; High cholesterol; Ingrowing toenail; Kidney problem; Obesity; Plantar fascial fibromatosis; Polyneuropathy; Tinea unguium; Type 2 diabetes mellitus with polyneuropathy         Past Surgical History  Ankle surgery; Knee surgery         Medication List  Accu-Chek Emperatriz Plus Meter miscellaneous misc; Accu-Chek Emperatriz Plus test strp miscellaneous strip; Accu-Chek Multiclix  "Lancet miscellaneous kit; amlodipine 5 mg oral tablet; aspirin 81 mg oral tablet,delayed release (DR/EC); atorvastatin 40 mg oral tablet; fluticasone 50 mcg/actuation nasal spray,suspension; hydralazine 25 mg oral tablet; Novolin N NPH U-100 Insulin 100 unit/mL subcutaneous suspension; Novolin R Regular U-100 Insuln 100 unit/mL injection solution; tamsulosin 0.4 mg oral capsule,extended release 24hr         Allergy List  Relafen       Allergies Reconciled  Family Medical History  Heart Disease; Ovarian Cancer, Family History         Social History  Alcohol (Never); Tobacco (Former)         Review of Systems  · Constitutional  o Denies  o : fatigue, night sweats  · Eyes  o Denies  o : double vision, blurred vision  · HENT  o Denies  o : vertigo, recent head injury  · Cardiovascular  o Denies  o : chest pain, irregular heart beats  · Respiratory  o Denies  o : shortness of breath, productive cough  · Gastrointestinal  o Denies  o : nausea, vomiting  · Genitourinary  o Denies  o : dysuria, urinary retention  · Integument  o * See HPI  · Neurologic  o Denies  o : altered mental status, seizures  · Musculoskeletal  o Denies  o : joint swelling, limitation of motion  · Endocrine  o Denies  o : cold intolerance, heat intolerance  · Heme-Lymph  o Denies  o : petechiae, lymph node enlargement or tenderness  · Allergic-Immunologic  o Denies  o : frequent illnesses      Vitals  Date Time BP Position Site L\R Cuff Size HR RR TEMP (F) WT  HT  BMI kg/m2 BSA m2 O2 Sat HC       08/05/2020 10:55 /118 Sitting    85 - R  97.8 282lbs 0oz 5'  10\" 40.46 2.51 95 %    08/05/2020 10:55 /72 Sitting                     Physical Examination  · Constitutional  o Appearance  o : well-nourished, well developed, no obvious deformities present, appears to be chronically ill   · Eyes  o Vision  o : No glasses.   · Respiratory  o Respiratory Effort  o : No labored breathing. Good respiratory effort.   · Cardiovascular  o Peripheral " Vascular System  o :   § Pedal Pulses  § : Pedal Pulses are 2+ and symmetrical  § Extremities  § : There is no edema of the lower extremities  · Musculoskeletal  o Extremeties/Joint  o : Lower extremity muscle strength and range of motion is equal and symmetrical bilaterally. The knees are noted to be in normal alignment. Ankle alignment and range of motion is normal and foot structure is normal.  · Skin and Subcutaneous Tissue  o General Inspection  o : no lesions present, no areas of discoloration, skin turgor normal, texture normal  · Neurologic  o Sensation  o : [place] 5.07 monofilament absent to all assessed areas. Sharp/dull sensation is absent.   · Toes  o Toes: Right Foot  o :   § Toenails  § : Toenails are hypertrophic, mycotic, dystrophic, brittle toenail(s) at nail 1, 2, 3, 4, 5 with onycholysis of the right foot. The 1st, 2nd, 3rd, 4th, 5th toenail(s) on the right have 2 mm in thickness with subungual detritus. There is an incurvated toenail at the distal border of the 1st, 2nd, 3rd, 4th, 5th toe  o Toes: Left Foot  o :   § Toenails  § : There are hypertrophic, mycotic, dystrophic, brittle toenail(s) at the 1, 2, 3, 4, 5 with onycholysis of the left foot. The 1st, 2nd, 3rd, 4th, 5th toenail(s) on the left have 2 mm in thickness with subungual detritus. There is an incurvated toenail at the distal border of the 1st, 2nd, 3rd, 4th, 5th toe  · Procedures  o Nail Debridement  o : Nail debridement is indicated for the following toenails:, left hallux, left 2nd toe, left 3rd toe, left 4th toe, left 5th toe, right hallux, right 2nd toe, right 3rd toe, right 4th toe, right 5th toe. The nail was debrided of excessive thickness to appropriate levels of comfort and contour using, nail nippers. The procedure was without complications          Assessment  · Foot pain, left     729.5/M79.672  · Foot pain, right     729.5/M79.671  · Ingrowing nail     703.0/L60.0  · Tinea unguium     110.1/B35.1  · Type 2  diabetes mellitus with polyneuropathy       Type 2 diabetes mellitus with diabetic polyneuropathy     250.60/E11.42  · Ingrowing toenail     703.0/L60.0      Plan  · Orders  o Debridement of six or more nails (73858, 79826, 64411, 78835, 68586, 17248) - 250.60/E11.42, 729.5/M79.672, 729.5/M79.671, 110.1/B35.1 - 08/05/2020  o ACO-16: BMI above normal range and follow-up plan is documented (, , , , , ) - - 08/05/2020  o Diabetic Foot (Motor and Sensory) Exam Completed [facility] (, , 2028F) - 250.60/E11.42 - 08/05/2020  · Medications  o Medications have been Reconciled  o Transition of Care or Provider Policy  · Instructions  o I have discussed the findings of this evaluation with the patient. The discussion included a complete verbal explanation of any changes in the examination results, diagnosis, and the current treatment plan. A schedule for future care needs was explained. If any questions should arise after returning home, I have encouraged the patient to feel free to contact Dr. Warner. The patient states understanding and agreement with this plan.   o Patient is to monitor for problems and to contact Dr. Warner for follow-up should such signs occur. Patient states understanding and agreement with this plan.   o Encouraged to follow-up with Primary Care Provider for preventative care.   o Follow up in 9 weeks for Routine Foot Care.   o BMI Counseling: Discussed weight loss and the need for exercise.   o Electronically Identified Patient Education Materials Provided Electronically  · Disposition  o Call or Return if symptoms worsen or persist.            Electronically Signed by: Lake Warner DPM -Author on August 5, 2020 11:03:15 AM

## 2021-05-13 NOTE — PROGRESS NOTES
Progress Note      Patient Name: Claude Johnson   Patient ID: [id number]   Sex: Male   YOB: 1940    Primary Care Provider: Shanice FONTANEZ   Referring Provider: Lake Warner DPM    Visit Date: October 7, 2020    Provider: Lake Warner DPM   Location: [facility]   Location Address: [address] [place] [address]  [id number]   Location Phone: [phone]          Chief Complaint  · Routine Foot Care Visit  · Diabetic Foot Examination       History Of Present Illness  Claude E. Johnson complains of painful, elongated toenails which are thickened, yellowed, chalky, and cause pain with shoe gear and ambulation. He is also due today for his yearly diabetic foot examination. He currently controls his diabetes with oral medication.      New, Established, New Problem:  est  Location:  Toenails  Duration:   Greater than five years  Onset:  Gradual  Nature:  Sore with palpation.  Stable, worsening, improving:   improving  Aggravating factors:  Pain with shoe gear and ambulation.  Previous Treatment:  Debridement     Patient denies any fevers, chills, nausea, vomiting, shortness of breathe, nor any other constitutional signs nor symptoms.    Pt states their most recent blood glucose reading was 112.    Patient relates no medical changes since their last visit.       Past Medical History  Aneurysm; Arthritis; Bunion; Calcaneal spur of foot, left; Corns and callus; Foot pain, left; Foot pain, right; Heart attack; High blood pressure; High cholesterol; Ingrowing toenail; Kidney problem; Obesity; Plantar fascial fibromatosis; Polyneuropathy; Tinea unguium; Type 2 diabetes mellitus with polyneuropathy         Past Surgical History  Ankle surgery; Knee surgery         Medication List  Accu-Chek Emperatriz Plus Meter miscellaneous misc; Accu-Chek Emperatriz Plus test strp miscellaneous strip; Accu-Chek Multiclix Lancet miscellaneous kit; amlodipine 5 mg oral tablet; aspirin 325 mg oral  "tablet; atorvastatin 40 mg oral tablet; fluticasone 50 mcg/actuation nasal spray,suspension; hydralazine 25 mg oral tablet; Novolin N NPH U-100 Insulin 100 unit/mL subcutaneous suspension; Novolin R Regular U-100 Insuln 100 unit/mL injection solution; tamsulosin 0.4 mg oral capsule,extended release 24hr         Allergy List  Relafen       Allergies Reconciled  Family Medical History  Heart Disease; Ovarian Cancer, Family History         Social History  Alcohol (Never); Tobacco (Former)         Review of Systems  · Constitutional  o Denies  o : fatigue, night sweats  · Eyes  o Denies  o : double vision, blurred vision  · HENT  o Denies  o : vertigo, recent head injury  · Cardiovascular  o Denies  o : chest pain, irregular heart beats  · Respiratory  o Denies  o : shortness of breath, productive cough  · Gastrointestinal  o Denies  o : nausea, vomiting  · Genitourinary  o Denies  o : dysuria, urinary retention  · Integument  o * See HPI  · Neurologic  o Denies  o : altered mental status, seizures  · Musculoskeletal  o Denies  o : joint swelling, limitation of motion  · Endocrine  o Denies  o : cold intolerance, heat intolerance  · Heme-Lymph  o Denies  o : petechiae, lymph node enlargement or tenderness  · Allergic-Immunologic  o Denies  o : frequent illnesses      Vitals  Date Time BP Position Site L\R Cuff Size HR RR TEMP (F) WT  HT  BMI kg/m2 BSA m2 O2 Sat FR L/min FiO2 HC       10/07/2020 09:47 /67 Sitting    68 - R  97.6 283lbs 0oz 5'  10\" 40.61 2.52 97 %            Physical Examination  · Constitutional  o Appearance  o : obese, well developed, no obvious deformities present, appears to be chronically ill   · Eyes  o Vision  o : No glasses.   · Respiratory  o Respiratory Effort  o : No labored breathing. Good respiratory effort.   · Cardiovascular  o Peripheral Vascular System  o :   § Pedal Pulses  § : Pedal Pulses are 2+ and symmetrical  § Extremities  § : There is no edema of the lower " extremities  · Musculoskeletal  o Extremeties/Joint  o : Lower extremity muscle strength and range of motion is equal and symmetrical bilaterally. The knees are noted to be in normal alignment. Ankle alignment and range of motion is normal and foot structure is normal.  · Skin and Subcutaneous Tissue  o General Inspection  o : no lesions present, no areas of discoloration, skin turgor normal, texture normal  · Neurologic  o Sensation  o : [place]-Govind 5.07 monofilament absent to all assessed areas. Sharp/dull sensation is absent.   · Toes  o Toes: Right Foot  o :   § Toenails  § : Toenails are hypertrophic, mycotic, dystrophic, brittle toenail(s) at nail 1, 2, 3, 4, 5 with onycholysis of the right foot. The 1st, 2nd, 3rd, 4th, 5th toenail(s) on the right have 2 mm in thickness with subungual detritus. There is an incurvated toenail at the distal border of the 1st, 2nd, 3rd, 4th, 5th toe  o Toes: Left Foot  o :   § Toenails  § : There are hypertrophic, mycotic, dystrophic, brittle toenail(s) at the 1, 2, 3, 4, 5 with onycholysis of the left foot. The 1st, 2nd, 3rd, 4th, 5th toenail(s) on the left have 2 mm in thickness with subungual detritus. There is an incurvated toenail at the distal border of the 1st, 2nd, 3rd, 4th, 5th toe  · Procedures  o Nail Debridement  o : Nail debridement is indicated for the following toenails:, left hallux, left 2nd toe, left 3rd toe, left 4th toe, left 5th toe, right hallux, right 2nd toe, right 3rd toe, right 4th toe, right 5th toe. The nail was debrided of excessive thickness to appropriate levels of comfort and contour using, nail nippers. The procedure was without complications          Assessment  · Foot pain, left     729.5/M79.672  · Foot pain, right     729.5/M79.671  · Ingrowing nail     703.0/L60.0  · Polyneuropathy     356.9/G62.9  · Tinea unguium     110.1/B35.1  · Type 2 diabetes mellitus with polyneuropathy       Type 2 diabetes mellitus with diabetic  polyneuropathy     250.60/E11.42  · Ingrowing toenail     703.0/L60.0      Plan  · Orders  o Debridement of six or more nails (59844, 36698, 11458, 15541, 38748, 08272, 01700) - 250.60/E11.42, 729.5/M79.672, 729.5/M79.671, 110.1/B35.1 - 10/07/2020  o ACO-16: BMI above normal range and follow-up plan is documented (, , , , , , ) - - 10/07/2020  o Diabetic Foot (Motor and Sensory) Exam Completed [facility] (, , 2028F) - 250.60/E11.42 - 10/07/2020  · Medications  o Medications have been Reconciled  o Transition of Care or Provider Policy  · Instructions  o I have discussed the findings of this evaluation with the patient. The discussion included a complete verbal explanation of any changes in the examination results, diagnosis, and the current treatment plan. A schedule for future care needs was explained. If any questions should arise after returning home, I have encouraged the patient to feel free to contact Dr. Warner. The patient states understanding and agreement with this plan.   o Patient is to monitor for problems and to contact Dr. Warner for follow-up should such signs occur. Patient states understanding and agreement with this plan.   o Encouraged to follow-up with Primary Care Provider for preventative care.   o Follow up in 9 weeks for Routine Foot Care.   o BMI Counseling: Discussed weight loss and the need for exercise.   o Electronically Identified Patient Education Materials Provided Electronically  · Disposition  o Call or Return if symptoms worsen or persist.            Electronically Signed by: Lake Warner DPM -Author on October 7, 2020 09:59:28 AM

## 2021-05-14 VITALS
TEMPERATURE: 97.6 F | HEIGHT: 70 IN | DIASTOLIC BLOOD PRESSURE: 67 MMHG | WEIGHT: 283 LBS | BODY MASS INDEX: 40.52 KG/M2 | OXYGEN SATURATION: 97 % | HEART RATE: 68 BPM | SYSTOLIC BLOOD PRESSURE: 140 MMHG

## 2021-05-14 VITALS
WEIGHT: 285 LBS | DIASTOLIC BLOOD PRESSURE: 64 MMHG | HEART RATE: 85 BPM | SYSTOLIC BLOOD PRESSURE: 135 MMHG | TEMPERATURE: 97.1 F | HEIGHT: 70 IN | OXYGEN SATURATION: 98 % | BODY MASS INDEX: 40.8 KG/M2

## 2021-05-14 VITALS
SYSTOLIC BLOOD PRESSURE: 129 MMHG | WEIGHT: 283 LBS | DIASTOLIC BLOOD PRESSURE: 64 MMHG | BODY MASS INDEX: 40.52 KG/M2 | HEART RATE: 82 BPM | HEIGHT: 70 IN | TEMPERATURE: 97.8 F | OXYGEN SATURATION: 99 %

## 2021-05-14 NOTE — PROGRESS NOTES
Progress Note      Patient Name: Claude Johnson   Patient ID: [id number]   Sex: Male   YOB: 1940    Primary Care Provider: Shanice FONTANEZ   Referring Provider: Lake Warner DPM    Visit Date: December 30, 2020    Provider: Lake Warner DPM   Location: [facility]   Location Address: [address] [place] [address]  [id number]   Location Phone: [phone]          Chief Complaint  · Routine Foot Care Visit  · Diabetic Foot Examination       History Of Present Illness  Claude E. Johnson complains of painful, elongated toenails which are thickened, yellowed, chalky, and cause pain with shoe gear and ambulation. He is also due today for his yearly diabetic foot examination. He currently controls his diabetes with oral medication.      New, Established, New Problem:  est  Location:  Toenails  Duration:   Greater than five years  Onset:  Gradual  Nature:  Sore with palpation.  Stable, worsening, improving:   stable  Aggravating factors:  Pain with shoe gear and ambulation.  Previous Treatment:  Debridement     Patient denies any fevers, chills, nausea, vomiting, shortness of breathe, nor any other constitutional signs nor symptoms.    Pt states their most recent blood glucose reading was 167.    Patient reports that no changes in their medications with their recent appointment with their primary care provider.       Past Medical History  Aneurysm; Arthritis; Bunion; Calcaneal spur of foot, left; Corns and callus; Foot pain, left; Foot pain, right; Heart attack; High blood pressure; High cholesterol; Ingrowing toenail; Kidney problem; Obesity; Plantar fascial fibromatosis; Polyneuropathy; Tinea unguium; Type 2 diabetes mellitus with polyneuropathy         Past Surgical History  Ankle surgery; Knee surgery         Medication List  Accu-Chek Emperatriz Plus Meter miscellaneous misc; Accu-Chek Emperatriz Plus test strp miscellaneous strip; Accu-Chek Multiclix Lancet miscellaneous  "kit; amlodipine 5 mg oral tablet; aspirin 325 mg oral tablet; atorvastatin 40 mg oral tablet; fluticasone 50 mcg/actuation nasal spray,suspension; hydralazine 25 mg oral tablet; Novolin N NPH U-100 Insulin 100 unit/mL subcutaneous suspension; Novolin R Regular U-100 Insuln 100 unit/mL injection solution; tamsulosin 0.4 mg oral capsule,extended release 24hr         Allergy List  Relafen       Allergies Reconciled  Family Medical History  Heart Disease; Ovarian Cancer, Family History         Social History  Alcohol (Never); Tobacco (Former)         Review of Systems  · Constitutional  o Denies  o : fatigue, night sweats  · Eyes  o Denies  o : double vision, blurred vision  · HENT  o Denies  o : vertigo, recent head injury  · Cardiovascular  o Denies  o : chest pain, irregular heart beats  · Respiratory  o Denies  o : shortness of breath, productive cough  · Gastrointestinal  o Denies  o : nausea, vomiting  · Genitourinary  o Denies  o : dysuria, urinary retention  · Integument  o * See HPI  · Neurologic  o Denies  o : altered mental status, seizures  · Musculoskeletal  o Denies  o : joint swelling, limitation of motion  · Endocrine  o Denies  o : cold intolerance, heat intolerance  · Heme-Lymph  o Denies  o : petechiae, lymph node enlargement or tenderness  · Allergic-Immunologic  o Denies  o : frequent illnesses      Vitals  Date Time BP Position Site L\R Cuff Size HR RR TEMP (F) WT  HT  BMI kg/m2 BSA m2 O2 Sat FR L/min FiO2        12/30/2020 09:38 /64 Sitting    82 - R  97.8 283lbs 0oz 5'  10\" 40.61 2.52 99 %            Physical Examination  · Constitutional  o Appearance  o : obese, well developed, no obvious deformities present, appears to be chronically ill   · Eyes  o Vision  o : No glasses.   · Respiratory  o Respiratory Effort  o : No labored breathing. Good respiratory effort.   · Cardiovascular  o Peripheral Vascular System  o :   § Pedal Pulses  § : Pedal Pulses are 2+ and " symmetrical  § Extremities  § : There is no edema of the lower extremities  · Musculoskeletal  o Extremeties/Joint  o : Lower extremity muscle strength and range of motion is equal and symmetrical bilaterally. The knees are noted to be in normal alignment. Ankle alignment and range of motion is normal and foot structure is normal.  · Skin and Subcutaneous Tissue  o General Inspection  o : no lesions present, no areas of discoloration, skin turgor normal, texture normal  · Neurologic  o Sensation  o : [place]-Govind 5.07 monofilament absent to all assessed areas. Sharp/dull sensation is absent.   · Toes  o Toes: Right Foot  o :   § Toenails  § : Toenails are hypertrophic, mycotic, dystrophic, brittle toenail(s) at nail 1, 2, 3, 4, 5 with onycholysis of the right foot. The 1st, 2nd, 3rd, 4th, 5th toenail(s) on the right have 2 mm in thickness with subungual detritus. There is an incurvated toenail at the distal border of the 1st, 2nd, 3rd, 4th, 5th toe  o Toes: Left Foot  o :   § Toenails  § : There are hypertrophic, mycotic, dystrophic, brittle toenail(s) at the 1, 2, 3, 4, 5 with onycholysis of the left foot. The 1st, 2nd, 3rd, 4th, 5th toenail(s) on the left have 2 mm in thickness with subungual detritus. There is an incurvated toenail at the distal border of the 1st, 2nd, 3rd, 4th, 5th toe  · Procedures  o Nail Debridement  o : Nail debridement is indicated for the following toenails:, left hallux, left 2nd toe, left 3rd toe, left 4th toe, left 5th toe, right hallux, right 2nd toe, right 3rd toe, right 4th toe, right 5th toe. The nail was debrided of excessive thickness to appropriate levels of comfort and contour using, nail nippers. The procedure was without complications          Assessment  · Foot pain, left     729.5/M79.672  · Foot pain, right     729.5/M79.671  · Ingrowing nail     703.0/L60.0  · Polyneuropathy     356.9/G62.9  · Tinea unguium     110.1/B35.1  · Type 2 diabetes mellitus with  polyneuropathy       Type 2 diabetes mellitus with diabetic polyneuropathy     250.60/E11.42  · Ingrowing toenail     703.0/L60.0      Plan  · Orders  o Debridement of six or more nails (84792, 59434, 44134, 04080, 98677, 03179, 80425, 33942) - 250.60/E11.42, 729.5/M79.672, 729.5/M79.671, 110.1/B35.1 - 12/30/2020  o ACO-16: BMI above normal range and follow-up plan is documented (, , , , , , , ) - - 12/30/2020  o Diabetic Foot (Motor and Sensory) Exam Completed [facility] (, , 2028F) - 250.60/E11.42 - 12/30/2020  · Medications  o Medications have been Reconciled  o Transition of Care or Provider Policy  · Instructions  o I have discussed the findings of this evaluation with the patient. The discussion included a complete verbal explanation of any changes in the examination results, diagnosis, and the current treatment plan. A schedule for future care needs was explained. If any questions should arise after returning home, I have encouraged the patient to feel free to contact Dr. Warner. The patient states understanding and agreement with this plan.   o Patient is to monitor for problems and to contact Dr. Warner for follow-up should such signs occur. Patient states understanding and agreement with this plan.   o Encouraged to follow-up with Primary Care Provider for preventative care.   o Follow up in 9 weeks for Routine Foot Care.   o BMI Counseling: Discussed weight loss and the need for exercise.   o Electronically Identified Patient Education Materials Provided Electronically  · Disposition  o Call or Return if symptoms worsen or persist.            Electronically Signed by: Lake Warner DPM -Author on December 30, 2020 09:55:20 AM

## 2021-05-14 NOTE — PROGRESS NOTES
Progress Note      Patient Name: Claude Johnson   Patient ID: [id number]   Sex: Male   YOB: 1940    Primary Care Provider: Shanice FONTANEZ   Referring Provider: Lake Warner DPM    Visit Date: March 3, 2021    Provider: Lake Warner DPM   Location: [facility]   Location Address: [address] [place] [address]  [id number]   Location Phone: [phone]          Chief Complaint  · Routine Foot Care Visit  · Diabetic Foot Examination       History Of Present Illness  Claude E. Johnson complains of painful, elongated toenails which are thickened, yellowed, chalky, and cause pain with shoe gear and ambulation. He is also due today for his yearly diabetic foot examination. He currently controls his diabetes with oral medication.      New, Established, New Problem:  est  Location:  Toenails  Duration:   Greater than five years  Onset:  Gradual  Nature:  Sore with palpation.  Stable, worsening, improving:   stable  Aggravating factors:  Pain with shoe gear and ambulation.  Previous Treatment:  Debridement     Patient denies any fevers, chills, nausea, vomiting, shortness of breathe, nor any other constitutional signs nor symptoms.    Pt states their most recent blood glucose reading was 180.    Patient reports the following medical changes since their last visit:    - received both COVID-19 vaccination injections       Past Medical History  Aneurysm; Arthritis; Bunion; Calcaneal spur of foot, left; Corns and callus; Foot pain, left; Foot pain, right; Heart attack; High blood pressure; High cholesterol; Ingrowing toenail; Kidney problem; Obesity; Plantar fascial fibromatosis; Polyneuropathy; Tinea unguium; Type 2 diabetes mellitus with polyneuropathy         Past Surgical History  Ankle surgery; Knee surgery         Medication List  Accu-Chek Emperatriz Plus Meter miscellaneous misc; Accu-Chek Emperatriz Plus test strp miscellaneous strip; Accu-Chek Multiclix Lancet miscellaneous  "kit; amlodipine 5 mg oral tablet; aspirin 325 mg oral tablet; atorvastatin 40 mg oral tablet; fluticasone 50 mcg/actuation nasal spray,suspension; hydralazine 25 mg oral tablet; Novolin N NPH U-100 Insulin 100 unit/mL subcutaneous suspension; Novolin R Regular U-100 Insuln 100 unit/mL injection solution; tamsulosin 0.4 mg oral capsule,extended release 24hr         Allergy List  Relafen       Allergies Reconciled  Family Medical History  Heart Disease; Ovarian Cancer, Family History         Social History  Alcohol (Never); Tobacco (Former)         Review of Systems  · Constitutional  o Denies  o : fatigue, night sweats  · Eyes  o Denies  o : double vision, blurred vision  · HENT  o Denies  o : vertigo, recent head injury  · Cardiovascular  o Denies  o : chest pain, irregular heart beats  · Respiratory  o Denies  o : shortness of breath, productive cough  · Gastrointestinal  o Denies  o : nausea, vomiting  · Genitourinary  o Denies  o : dysuria, urinary retention  · Integument  o * See HPI  · Neurologic  o Denies  o : altered mental status, seizures  · Musculoskeletal  o Denies  o : joint swelling, limitation of motion  · Endocrine  o Denies  o : cold intolerance, heat intolerance  · Heme-Lymph  o Denies  o : petechiae, lymph node enlargement or tenderness  · Allergic-Immunologic  o Denies  o : frequent illnesses      Vitals  Date Time BP Position Site L\R Cuff Size HR RR TEMP (F) WT  HT  BMI kg/m2 BSA m2 O2 Sat FR L/min FiO2 HC       03/03/2021 08:49 /64 Sitting    85 - R  97.1 285lbs 0oz 5'  10\" 40.89 2.53 98 %            Physical Examination  · Constitutional  o Appearance  o : obese, well developed, no obvious deformities present, appears to be chronically ill   · Eyes  o Vision  o : No glasses.   · Respiratory  o Respiratory Effort  o : No labored breathing. Good respiratory effort.   · Cardiovascular  o Peripheral Vascular System  o :   § Pedal Pulses  § : Pedal Pulses are 2+ and " symmetrical  § Extremities  § : There is no edema of the lower extremities  · Musculoskeletal  o Extremeties/Joint  o : Lower extremity muscle strength and range of motion is equal and symmetrical bilaterally. The knees are noted to be in normal alignment. Ankle alignment and range of motion is normal and foot structure is normal.  · Skin and Subcutaneous Tissue  o General Inspection  o : no lesions present, no areas of discoloration, skin turgor normal, texture normal  · Neurologic  o Sensation  o : Potrero-Govind 5.07 monofilament absent to all assessed areas. Sharp/dull sensation is absent.   · Toes  o Toes: Right Foot  o :   § Toenails  § : Toenails are hypertrophic, mycotic, dystrophic, brittle toenail(s) at nail 1, 2, 3, 4, 5 with onycholysis of the right foot. The 1st, 2nd, 3rd, 4th, 5th toenail(s) on the right have 2 mm in thickness with subungual detritus. There is an incurvated toenail at the distal border of the 1st, 2nd, 3rd, 4th, 5th toe  o Toes: Left Foot  o :   § Toenails  § : There are hypertrophic, mycotic, dystrophic, brittle toenail(s) at the 1, 2, 3, 4, 5 with onycholysis of the left foot. The 1st, 2nd, 3rd, 4th, 5th toenail(s) on the left have 2 mm in thickness with subungual detritus. There is an incurvated toenail at the distal border of the 1st, 2nd, 3rd, 4th, 5th toe  · Procedures  o Nail Debridement  o : Nail debridement is indicated for the following toenails:, left hallux, left 2nd toe, left 3rd toe, left 4th toe, left 5th toe, right hallux, right 2nd toe, right 3rd toe, right 4th toe, right 5th toe. The nail was debrided of excessive thickness to appropriate levels of comfort and contour using, nail nippers. The procedure was without complications          Assessment  · Foot pain, left     729.5/M79.672  · Foot pain, right     729.5/M79.671  · Ingrowing nail     703.0/L60.0  · Polyneuropathy     356.9/G62.9  · Tinea unguium     110.1/B35.1  · Type 2 diabetes mellitus with  polyneuropathy       Type 2 diabetes mellitus with diabetic polyneuropathy     250.60/E11.42  · Ingrowing toenail     703.0/L60.0      Plan  · Orders  o Debridement of six or more nails (04727, 22676, 53086, 40086, 40472, 58512, 69642, 97504, 40197) - 250.60/E11.42, 729.5/M79.672, 729.5/M79.671, 110.1/B35.1 - 03/03/2021  o ACO-16: BMI above normal range and follow-up plan is documented (, , , , , , , , ) - - 03/03/2021  o Diabetic Foot (Motor and Sensory) Exam Completed [facility] (, , 2028F) - 250.60/E11.42 - 03/03/2021  · Medications  o Medications have been Reconciled  o Transition of Care or Provider Policy  · Instructions  o I have discussed the findings of this evaluation with the patient. The discussion included a complete verbal explanation of any changes in the examination results, diagnosis, and the current treatment plan. A schedule for future care needs was explained. If any questions should arise after returning home, I have encouraged the patient to feel free to contact Dr. Warner. The patient states understanding and agreement with this plan.   o Patient is to monitor for problems and to contact Dr. Warner for follow-up should such signs occur. Patient states understanding and agreement with this plan.   o Encouraged to follow-up with Primary Care Provider for preventative care.   o Follow up in 9 weeks for Routine Foot Care.   o BMI Counseling: Discussed weight loss and the need for exercise.   o Diabetic foot exam performed and documented this date, compliant with CQM required standards. Detail of findings as noted in physical exam.Lower extremity Neuro exam for diabetic patient performed and documented this date, compliant with PQRS required standards. Detail of findings as noted in physical exam.Advised patient importance of good routine lower extremity hygiene. Advised patient importance of evaluating for intact skin and pain free nail borders.  Advised patient to use mirror to evaluate plantar/ soles of feet for better visualization. Advised patient monitor and phone office to be seen if any cracking to skin, open lesions, painful nail borders or if nails become elongated prior to next visit. Advised patient importance of daily cleansing of lower extremities, followed by good skin cream to maintain normal hydration of skin. Also advised patient importance of close daily monitoring of blood sugar. Advised to regulate diet and medications to maintain control of blood sugar in optimal range. Contact primary care provider if difficulties maintaining blood sugar levels.Advised Patient of presence of Diabetes Mellitus condition. Advised Patient risk of progression and worsening or improvement, then return of condition. Will monitor condition for any change in future. Treat with most appropriate treatment pending status of condition.Counseled and advised patient extensively on nature and ramifications of diabetes. Standard instructions given to patient for good diabetic foot care and maintenance. Advised importance of careful monitoring to avoid break down and complications secondary to diabetes. Advised patient importance of strict maintenance of blood sugar control. Advised patient of possible ominous results from neglect of condition,i.e.: amputation/ loss of digits, feet and legs, or even death.Patient states understands counseling, will monitor closely, continue good hygiene and routine diabetic foot care. Patient will contact office is questions or problems.   o Electronically Identified Patient Education Materials Provided Electronically  · Disposition  o Call or Return if symptoms worsen or persist.            Electronically Signed by: Lake Warner DPM -Author on March 3, 2021 09:10:28 AM

## 2021-05-15 VITALS
WEIGHT: 278 LBS | HEART RATE: 58 BPM | BODY MASS INDEX: 39.8 KG/M2 | DIASTOLIC BLOOD PRESSURE: 60 MMHG | OXYGEN SATURATION: 97 % | SYSTOLIC BLOOD PRESSURE: 119 MMHG | HEIGHT: 70 IN

## 2021-05-15 VITALS
HEIGHT: 70 IN | OXYGEN SATURATION: 95 % | TEMPERATURE: 97.8 F | BODY MASS INDEX: 40.37 KG/M2 | SYSTOLIC BLOOD PRESSURE: 140 MMHG | DIASTOLIC BLOOD PRESSURE: 118 MMHG | HEART RATE: 85 BPM | WEIGHT: 282 LBS

## 2021-05-15 VITALS
WEIGHT: 272 LBS | OXYGEN SATURATION: 98 % | HEART RATE: 68 BPM | BODY MASS INDEX: 38.94 KG/M2 | DIASTOLIC BLOOD PRESSURE: 67 MMHG | SYSTOLIC BLOOD PRESSURE: 140 MMHG | HEIGHT: 70 IN

## 2021-05-15 VITALS
OXYGEN SATURATION: 99 % | BODY MASS INDEX: 38.8 KG/M2 | HEIGHT: 70 IN | DIASTOLIC BLOOD PRESSURE: 66 MMHG | SYSTOLIC BLOOD PRESSURE: 137 MMHG | WEIGHT: 271 LBS | HEART RATE: 66 BPM

## 2021-05-15 VITALS
OXYGEN SATURATION: 99 % | SYSTOLIC BLOOD PRESSURE: 112 MMHG | HEART RATE: 69 BPM | HEIGHT: 70 IN | DIASTOLIC BLOOD PRESSURE: 68 MMHG | WEIGHT: 278 LBS | BODY MASS INDEX: 39.8 KG/M2

## 2021-05-15 VITALS
BODY MASS INDEX: 38.65 KG/M2 | DIASTOLIC BLOOD PRESSURE: 73 MMHG | HEART RATE: 58 BPM | HEIGHT: 70 IN | SYSTOLIC BLOOD PRESSURE: 138 MMHG | OXYGEN SATURATION: 98 % | WEIGHT: 270 LBS

## 2021-05-16 VITALS — WEIGHT: 278 LBS | HEIGHT: 70 IN | HEART RATE: 61 BPM | BODY MASS INDEX: 39.8 KG/M2 | OXYGEN SATURATION: 95 %

## 2021-05-16 VITALS — HEIGHT: 70 IN | BODY MASS INDEX: 39.37 KG/M2 | WEIGHT: 275 LBS | HEART RATE: 66 BPM | OXYGEN SATURATION: 97 %

## 2021-05-16 VITALS — WEIGHT: 270 LBS | HEART RATE: 63 BPM | OXYGEN SATURATION: 97 % | HEIGHT: 70 IN | BODY MASS INDEX: 38.65 KG/M2

## 2021-05-16 VITALS
HEIGHT: 70 IN | SYSTOLIC BLOOD PRESSURE: 140 MMHG | WEIGHT: 270 LBS | HEART RATE: 78 BPM | OXYGEN SATURATION: 98 % | BODY MASS INDEX: 38.65 KG/M2 | DIASTOLIC BLOOD PRESSURE: 62 MMHG

## 2021-05-16 VITALS — BODY MASS INDEX: 38.65 KG/M2 | HEIGHT: 70 IN | OXYGEN SATURATION: 95 % | WEIGHT: 270 LBS | HEART RATE: 59 BPM

## 2021-06-05 NOTE — PROGRESS NOTES
Progress Note      Patient Name: Claude Johnson   Patient ID: [id number]   Sex: Male   YOB: 1940    Primary Care Provider: Shanice FONTANEZ   Referring Provider: Lake Warner DPM    Visit Date: May 5, 2021    Provider: Lake Warner DPM   Location: [facility]   Location Address: [address] [place] [address]  [id number]   Location Phone: [phone]          Chief Complaint  · Routine Foot Care Visit  · Diabetic Foot Examination       History Of Present Illness  Claude E. Johnson complains of painful, elongated toenails which are thickened, yellowed, chalky, and cause pain with shoe gear and ambulation. He is also due today for his yearly diabetic foot examination. He currently controls his diabetes with oral medication.      New, Established, New Problem:  est  Location:  Toenails  Duration:   Greater than five years  Onset:  Gradual  Nature:  Sore with palpation.  Stable, worsening, improving:   stable  Aggravating factors:  Pain with shoe gear and ambulation.  Previous Treatment:  Debridement     Patient denies any fevers, chills, nausea, vomiting, shortness of breathe, nor any other constitutional signs nor symptoms.    Pt states their most recent blood glucose reading was 174.    Patient reports the following medical changes since their last visit:    - increase in insulin dose.       Past Medical History  Aneurysm; Arthritis; Bunion; Calcaneal spur of foot, left; Corns and callus; Foot pain, left; Foot pain, right; Heart attack; High blood pressure; High cholesterol; Ingrowing toenail; Kidney problem; Obesity; Plantar fascial fibromatosis; Polyneuropathy; Tinea unguium; Type 2 diabetes mellitus with polyneuropathy         Past Surgical History  Ankle surgery; Knee surgery         Medication List  Accu-Chek Emperatriz Plus Meter miscellaneous misc; Accu-Chek Emperatriz Plus test strp miscellaneous strip; Accu-Chek Multiclix Lancet miscellaneous kit; amlodipine 5 mg  "oral tablet; aspirin 325 mg oral tablet; atorvastatin 40 mg oral tablet; fluticasone 50 mcg/actuation nasal spray,suspension; hydralazine 25 mg oral tablet; Novolin N NPH U-100 Insulin 100 unit/mL subcutaneous suspension; Novolin R Regular U-100 Insuln 100 unit/mL injection solution; tamsulosin 0.4 mg oral capsule,extended release 24hr         Allergy List  Relafen       Allergies Reconciled  Family Medical History  Heart Disease; Ovarian Cancer, Family History         Social History  Alcohol (Never); Tobacco (Former)         Review of Systems  · Constitutional  o Denies  o : fatigue, night sweats  · Eyes  o Denies  o : double vision, blurred vision  · HENT  o Denies  o : vertigo, recent head injury  · Cardiovascular  o Denies  o : chest pain, irregular heart beats  · Respiratory  o Denies  o : shortness of breath, productive cough  · Gastrointestinal  o Denies  o : nausea, vomiting  · Genitourinary  o Denies  o : dysuria, urinary retention  · Integument  o * See HPI  · Neurologic  o Denies  o : altered mental status, seizures  · Musculoskeletal  o Denies  o : joint swelling, limitation of motion  · Endocrine  o Denies  o : cold intolerance, heat intolerance  · Heme-Lymph  o Denies  o : petechiae, lymph node enlargement or tenderness  · Allergic-Immunologic  o Denies  o : frequent illnesses      Vitals  Date Time BP Position Site L\R Cuff Size HR RR TEMP (F) WT  HT  BMI kg/m2 BSA m2 O2 Sat FR L/min FiO2        05/05/2021 08:51 /79 Sitting    96 - R  97.5 284lbs 0oz 5'  10\" 40.75 2.52 97 %            Physical Examination  · Constitutional  o Appearance  o : obese, well developed, no obvious deformities present, appears to be chronically ill   · Eyes  o Vision  o : No glasses.   · Respiratory  o Respiratory Effort  o : No labored breathing. Good respiratory effort.   · Cardiovascular  o Peripheral Vascular System  o :   § Pedal Pulses  § : Pedal Pulses are 2+ and symmetrical  § Extremities  § : There is no " edema of the lower extremities  · Musculoskeletal  o Extremeties/Joint  o : Lower extremity muscle strength and range of motion is equal and symmetrical bilaterally. The knees are noted to be in normal alignment. Ankle alignment and range of motion is normal and foot structure is normal.  · Skin and Subcutaneous Tissue  o General Inspection  o : no lesions present, no areas of discoloration, skin turgor normal, texture normal  · Neurologic  o Sensation  o : Diboll-Govind 5.07 monofilament absent to all assessed areas. Sharp/dull sensation is absent.   · Toes  o Toes: Right Foot  o :   § Toenails  § : Toenails are hypertrophic, mycotic, dystrophic, brittle toenail(s) at nail 1, 2, 3, 4, 5 with onycholysis of the right foot. The 1st, 2nd, 3rd, 4th, 5th toenail(s) on the right have 2 mm in thickness with subungual detritus. There is an incurvated toenail at the distal border of the 1st, 2nd, 3rd, 4th, 5th toe  o Toes: Left Foot  o :   § Toenails  § : There are hypertrophic, mycotic, dystrophic, brittle toenail(s) at the 1, 2, 3, 4, 5 with onycholysis of the left foot. The 1st, 2nd, 3rd, 4th, 5th toenail(s) on the left have 2 mm in thickness with subungual detritus. There is an incurvated toenail at the distal border of the 1st, 2nd, 3rd, 4th, 5th toe  · Procedures  o Nail Debridement  o : Nail debridement is indicated for the following toenails:, left hallux, left 2nd toe, left 3rd toe, left 4th toe, left 5th toe, right hallux, right 2nd toe, right 3rd toe, right 4th toe, right 5th toe. The nail was debrided of excessive thickness to appropriate levels of comfort and contour using, nail nippers. The procedure was without complications          Assessment  · Foot pain, left     729.5/M79.672  · Foot pain, right     729.5/M79.671  · Ingrowing nail     703.0/L60.0  · Polyneuropathy     356.9/G62.9  · Tinea unguium     110.1/B35.1  · Type 2 diabetes mellitus with polyneuropathy       Type 2 diabetes mellitus with  diabetic polyneuropathy     250.60/E11.42  · Ingrowing toenail     703.0/L60.0      Plan  · Orders  o Debridement of six or more nails (25153, 61655, 98650, 83291, 21878, 38336, 26417, 66864, 97233, 62256) - 250.60/E11.42, 729.5/M79.672, 729.5/M79.671, 110.1/B35.1 - 05/05/2021  o ACO-16: BMI above normal range and follow-up plan is documented (, , , , , , , , , ) - - 05/05/2021  o Diabetic Foot (Motor and Sensory) Exam Completed [facility] (, , 2028F) - 250.60/E11.42 - 05/05/2021  · Medications  o Medications have been Reconciled  o Transition of Care or Provider Policy  · Instructions  o I have discussed the findings of this evaluation with the patient. The discussion included a complete verbal explanation of any changes in the examination results, diagnosis, and the current treatment plan. A schedule for future care needs was explained. If any questions should arise after returning home, I have encouraged the patient to feel free to contact Dr. Warner. The patient states understanding and agreement with this plan.   o Patient is to monitor for problems and to contact Dr. Warner for follow-up should such signs occur. Patient states understanding and agreement with this plan.   o Encouraged to follow-up with Primary Care Provider for preventative care.   o Follow up in 9 weeks for Routine Foot Care.   o BMI Counseling: Discussed weight loss and the need for exercise.   o Diabetic foot exam performed and documented this date, compliant with CQM required standards. Detail of findings as noted in physical exam.Lower extremity Neuro exam for diabetic patient performed and documented this date, compliant with PQRS required standards. Detail of findings as noted in physical exam.Advised patient importance of good routine lower extremity hygiene. Advised patient importance of evaluating for intact skin and pain free nail borders. Advised patient to use mirror to  evaluate plantar/ soles of feet for better visualization. Advised patient monitor and phone office to be seen if any cracking to skin, open lesions, painful nail borders or if nails become elongated prior to next visit. Advised patient importance of daily cleansing of lower extremities, followed by good skin cream to maintain normal hydration of skin. Also advised patient importance of close daily monitoring of blood sugar. Advised to regulate diet and medications to maintain control of blood sugar in optimal range. Contact primary care provider if difficulties maintaining blood sugar levels.Advised Patient of presence of Diabetes Mellitus condition. Advised Patient risk of progression and worsening or improvement, then return of condition. Will monitor condition for any change in future. Treat with most appropriate treatment pending status of condition.Counseled and advised patient extensively on nature and ramifications of diabetes. Standard instructions given to patient for good diabetic foot care and maintenance. Advised importance of careful monitoring to avoid break down and complications secondary to diabetes. Advised patient importance of strict maintenance of blood sugar control. Advised patient of possible ominous results from neglect of condition,i.e.: amputation/ loss of digits, feet and legs, or even death.Patient states understands counseling, will monitor closely, continue good hygiene and routine diabetic foot care. Patient will contact office is questions or problems.   o Electronically Identified Patient Education Materials Provided Electronically  · Disposition  o Call or Return if symptoms worsen or persist.            Electronically Signed by: Lake Warner DPM -Author on May 5, 2021 08:55:36 AM

## 2021-07-15 ENCOUNTER — OFFICE VISIT (OUTPATIENT)
Dept: PODIATRY | Facility: CLINIC | Age: 81
End: 2021-07-15

## 2021-07-15 VITALS
BODY MASS INDEX: 40.52 KG/M2 | OXYGEN SATURATION: 97 % | WEIGHT: 283 LBS | TEMPERATURE: 97.6 F | SYSTOLIC BLOOD PRESSURE: 138 MMHG | HEART RATE: 82 BPM | HEIGHT: 70 IN | DIASTOLIC BLOOD PRESSURE: 76 MMHG

## 2021-07-15 VITALS
WEIGHT: 284 LBS | DIASTOLIC BLOOD PRESSURE: 79 MMHG | OXYGEN SATURATION: 97 % | HEIGHT: 70 IN | BODY MASS INDEX: 40.66 KG/M2 | HEART RATE: 96 BPM | SYSTOLIC BLOOD PRESSURE: 131 MMHG | TEMPERATURE: 97.5 F

## 2021-07-15 DIAGNOSIS — B35.1 ONYCHOMYCOSIS: ICD-10-CM

## 2021-07-15 DIAGNOSIS — L60.0 ONYCHOCRYPTOSIS: ICD-10-CM

## 2021-07-15 DIAGNOSIS — M79.672 FOOT PAIN, BILATERAL: Primary | ICD-10-CM

## 2021-07-15 DIAGNOSIS — M79.671 FOOT PAIN, BILATERAL: Primary | ICD-10-CM

## 2021-07-15 DIAGNOSIS — E10.9 INSULIN DEPENDENT DIABETES MELLITUS TYPE IA (HCC): ICD-10-CM

## 2021-07-15 DIAGNOSIS — M20.11 HALLUX VALGUS OF RIGHT FOOT: ICD-10-CM

## 2021-07-15 PROCEDURE — 99213 OFFICE O/P EST LOW 20 MIN: CPT | Performed by: PODIATRIST

## 2021-07-15 PROCEDURE — G8404 LOW EXTEMITY NEUR EXAM DOCUM: HCPCS | Performed by: PODIATRIST

## 2021-07-15 PROCEDURE — 11721 DEBRIDE NAIL 6 OR MORE: CPT | Performed by: PODIATRIST

## 2021-07-15 RX ORDER — ACETAMINOPHEN 325 MG/1
TABLET ORAL
COMMUNITY
Start: 2021-04-13

## 2021-07-15 NOTE — PATIENT INSTRUCTIONS
Diabetic foot exam performed and documented this date, compliant with CQM required standards. Detail of findings as noted in physical exam.  Lower extremity Neurologic exam for diabetic patient performed and documented this date, compliant with PQRS required standards. Detail of findings as noted in physical exam.  Advised patient importance of good routine lower extremity hygiene. Advised patient importance of evaluating for intact skin and pain free nail borders.  Advised patient to use mirror to evaluate plantar/ soles of feet for better visualization. Advised patient monitor and phone office to be seen if any cracking to skin, open lesions, painful nail borders or if nails become elongated prior to next visit. Advised patient importance of daily cleansing of lower extremities, followed by good skin cream to maintain normal hydration of skin. Also advised patient importance of close daily monitoring of blood sugar. Advised to regulate diet and medications to maintain control of blood sugar in optimal range. Contact primary care provider if difficulties maintaining blood sugar levels.  Advised Patient of presence of Diabetes Mellitus condition.  Advised Patient risk of progression and worsening or improvement, then return of condition.  Will monitor condition for any change in future. Treat with most appropriate treatment pending status of condition.  Counseled and advised patient extensively on nature and ramifications of diabetes. Standard instructions given to patient for good diabetic foot care and maintenance. Advised importance of careful monitoring to avoid break down and complications secondary to diabetes. Advised patient importance of strict maintenance of blood sugar control. Advised patient of possible ominous results from neglect of condition, i.e.: amputation/ loss of digits, feet and legs, or even death.  Patient states understands counseling, will monitor closely, continue good hygiene and  routine diabetic foot care. Patient will contact office is questions or problems.      Patient is to monitor for recurrence and any new symptoms and to contact Dr. Warner's office for a follow-up appointment.      The patient states understanding and agreement with this plan.

## 2021-07-15 NOTE — PROGRESS NOTES
[facility] - PODIATRY    Today's Date: 07/15/21    Patient Name: Claude E Johnson  MRN: [id number]  CSN: [id number]  PCP: Shanice Cummings APRN  Referring Provider: No ref. provider found    SUBJECTIVE     Chief Complaint   Patient presents with   • Left Foot - Nail Problem   • Right Foot - Nail Problem     HPI: Claude E Johnson, a 80 y.o.male, comes to clinic.    New, Established, New Problem:  established     Location:  Toenails    Duration:   Greater than five years    Onset:  Gradual    Nature:  sore with palpation.    Stable, worsening, improving:   Improving    Aggravating factors:  Pain with shoe gear and ambulation.    Previous Treatment:  debridement  __________________________________    New, Established, New Problem:  Established    Location:  bilateral feet    Duration:    Onset:  gradual    Nature:   IDDM     Stable, worsening, improving:  stable    Patient reported last blood glucose: 104  __________________________________    Patient reports the following medical changes since their last visit: Patient states with diet changes has lost 8 pounds recently.    No other pedal complaints at this time.    Patient denies any fevers, chills, nausea, vomiting, shortness of breathe, nor any other constitutional signs nor symptoms.       Last PCP Visit:  Shanice Cummings APRN, May 2021    Past Medical History:   Diagnosis Date   • Aneurysm (arteriovenous) of coronary vessels    • Arthritis    • Bilateral foot pain 07/03/2018   • Bunion    • Calcaneal spur of foot, left    • CKD (chronic kidney disease)    • Corns and callus    • Coronary artery disease    • HBP (high blood pressure)    • Heart attack (CMS/HCC)    • High cholesterol    • Hyperlipidemia    • Hypertension    • Ingrowing toenail    • Kidney problem    • Obesity    • Plantar fascial fibromatosis    • Polyneuropathy    • Tinea unguium 07/03/2018   • Type 2 diabetes mellitus (CMS/HCC)    • Type 2 diabetes mellitus with polyneuropathy  (CMS/HCC)      Past Surgical History:   Procedure Laterality Date   • ANKLE SURGERY     • COLONOSCOPY N/A 2020    Procedure: COLONOSCOPY to Cecum and TI with Cold Polypectomies;  Surgeon: Tyler Warner MD;  Location: [facility] ENDOSCOPY;  Service: Gastroenterology   • CORONARY ARTERY BYPASS GRAFT     • REPLACEMENT TOTAL KNEE Bilateral    • TOTAL SHOULDER REPLACEMENT Bilateral      Family History   Problem Relation Age of Onset   • Cancer Mother         ovarian   • Heart disease Father    • Cancer Sister         breast   • Lymphoma Sister         Non-Hodgkin's   • Cancer Brother         colon   • Heart disease Brother    • Ovarian cancer Other      Social History     Socioeconomic History   • Marital status:      Spouse name: Not on file   • Number of children: Not on file   • Years of education: Not on file   • Highest education level: Not on file   Tobacco Use   • Smoking status: Former Smoker     Packs/day: 2.00     Years: 50.00     Pack years: 100.00     Types: Cigarettes     Quit date:      Years since quittin.5   • Smokeless tobacco: Former User   • Tobacco comment: 8 yrs ago   Vaping Use   • Vaping Use: Never used   Substance and Sexual Activity   • Alcohol use: No   • Drug use: No   • Sexual activity: Defer     Allergies   Allergen Reactions   • Morphine Hives   • Morphine And Related Hives   • Nabumetone Rash     Current Outpatient Medications   Medication Sig Dispense Refill   • ACCU-CHEK JEFF test strip USE TO CHECK BLOOD SUGAR 4 TIMES DAILY E 11.8 200 each 6   • ACCU-CHEK SOFTCLIX LANCETS lancets 1 each by Other route 4 (Four) Times a Day. Use as instructed 400 each 3   • acetaminophen (TYLENOL) 325 MG tablet TAKE TWO TABLETS BY MOUTH FOUR TIMES A DAY AS NEEDED FOR PAIN     • amLODIPine (NORVASC) 10 MG tablet 1/2 tablet twice daily 30 tablet 5   • aspirin 325 MG tablet Take  by mouth daily.     • atorvastatin (LIPITOR) 40 MG tablet TAKE 1 TABLET DAILY 90 tablet 3   • Blood Glucose  "Monitoring Suppl (ACCU-CHEK JEFF PLUS) w/Device kit USE TO CHECK BLOOD SUGAR 4 TIMES DAILY  E 11.8 1 kit 0   • fluticasone (FLONASE) 50 MCG/ACT nasal spray 2 sprays by Each Nare route Daily. 3 bottle 3   • furosemide (LASIX) 20 MG tablet Take 20 mg by mouth Daily.     • hydrALAZINE (APRESOLINE) 25 MG tablet Take 25 mg by mouth 3 (Three) Times a Day.  2   • hydrocortisone 2.5 % cream      • insulin NPH (NovoLIN N ReliOn) 100 UNIT/ML injection Inject 32 Units under the skin into the appropriate area as directed 2 (two) times a day. 50 mL 3   • Insulin Pen Needle (PEN NEEDLES) 29G X 12MM misc Inject 1 each under the skin 4 (Four) Times a Day. Use with Insulin 400 each 1   • insulin regular (NOVOLIN R RELION) 100 UNIT/ML injection INJECT 10 UNITS SUBCUTANEOUSLY BEFORE BREAKFAST AND 8 AT DINNER (Patient taking differently: Inject 12 Units under the skin into the appropriate area as directed. INJECT 12 UNITS SUBCUTANEOUSLY BEFORE BREAKFAST AND 8 AT DINNER) 10 mL 3   • Insulin Syringe (RELION INSULIN SYRINGE) 29G X 1/2\" 1 ML misc 4 times daily sc inj 200 each 3   • Lancets (ACCU-CHEK MULTICLIX) lancets USE TO TEST BLOOD SUGAR 4 TIMES DAILY  E 11.8 200 each 6   • Multiple Vitamin (MULTI VITAMIN DAILY PO) Take  by mouth.     • tamsulosin (FLOMAX) 0.4 MG capsule 24 hr capsule Take 1 capsule by mouth Every Night. 90 capsule 1   • RELION INSULIN SYRINGE 1ML/31G 31G X 5/16\" 1 ML misc USE 1 SYRINGE 4 TIMES DAILY 200 each 1     No current facility-administered medications for this visit.     Review of Systems   Skin:        Painful toenails   All other systems reviewed and are negative.      OBJECTIVE     Vitals:    07/15/21 0854   BP: 138/76   Pulse: 82   Temp: 97.6 °F (36.4 °C)   SpO2: 97%       Lab Results   Component Value Date    HGBA1C 8.60 (H) 10/22/2020       Lab Results   Component Value Date    GLUCOSE 220 (H) 12/02/2019    CALCIUM 9.3 10/22/2020     10/22/2020    K 4.7 10/22/2020    CO2 25.7 10/22/2020     " 10/22/2020    BUN 20 10/22/2020    CREATININE 1.27 10/22/2020    EGFRIFAFRI 66 10/22/2020    EGFRIFNONA 54 (L) 10/22/2020    BCR 15.7 10/22/2020    ANIONGAP 11.5 2019       Patient seen in no apparent distress.      PHYSICAL EXAM:     Foot/Ankle Exam:       General:   Diabetic Foot Exam Performed    Appearance: obesity and elderly    Orientation: AAOx3    Affect: appropriate    Gait: unimpaired    Shoe Gear:  Casual shoes    VASCULAR      Right Foot Vascularity   Dorsalis pedis:  1+  Posterior tibial:  1+  Skin Temperature: cool    Edema Gradin+ and pitting  CFT:  3  Pedal Hair Growth:  Absent  Varicosities: mild varicosities       Left Foot Vascularity   Dorsalis pedis:  1+  Posterior tibial:  1+  Skin Temperature: cool    Edema Grading:  Pitting and 1+  CFT:  3  Pedal Hair Growth:  Absent  Varicosities: mild varicosities        NEUROLOGIC     Right Foot Neurologic   Normal sensation    Light touch sensation:  Normal  Vibratory sensation:  Normal  Hot/Cold sensation: normal    Protective Sensation using [place]-Govind Monofilament:  10     Left Foot Neurologic   Normal sensation    Light touch sensation:  Normal  Vibratory sensation:  Normal  Hot/cold sensation: normal    Protective Sensation using [place]-Govind Monofilament:  10     MUSCULOSKELETAL      Right Foot Musculoskeletal   Arch:  Normal  Hallux valgus: Yes       Left Foot Musculoskeletal   Arch:  Normal     MUSCLE STRENGTH     Right Foot Muscle Strength   Normal strength    Foot dorsiflexion:  5  Foot plantar flexion:  5  Foot inversion:  5  Foot eversion:  5     Left Foot Muscle Strength   Normal strength    Foot dorsiflexion:  5  Foot plantar flexion:  5  Foot inversion:  5  Foot eversion:  5     RANGE OF MOTION      Right Foot Range of Motion   Foot and ankle ROM within normal limits       Left Foot Range of Motion   Foot and ankle ROM within normal limits       DERMATOLOGIC     Right Foot Dermatologic   Skin: skin intact    Nails:  onychomycosis, abnormally thick, subungual debris, dystrophic nails and ingrown toenail    Nails comment:  Toenails 1, 2, 3, 4, and 5     Left Foot Dermatologic   Skin: skin intact    Nails: onychomycosis, abnormally thick, subungual debris, dystrophic nails and ingrown toenail    Nails comment:  Toenails 1, 2, 3, 4, and 5      Diabetic Foot Exam Performed    ASSESSMENT/PLAN     Diagnoses and all orders for this visit:    1. Foot pain, bilateral (Primary)    2. Onychocryptosis    3. Onychomycosis    4. Hallux valgus of right foot    5. Insulin dependent diabetes mellitus type IA (CMS/[facility])        Comprehensive lower extremity examination and evaluation was performed.    Discussed findings and treatment plan including risks, benefits, and treatment options with patient in detail. Patient agreed with treatment plan.    Toenails 1 through 5 bilaterally were debrided in thickness and length and then smoothed with a Dremel Tool.  Tolerated the procedure well without complications.    Diabetic foot exam performed and documented this date, compliant with CQM required standards. Detail of findings as noted in physical exam.  Lower extremity Neurologic exam for diabetic patient performed and documented this date, compliant with PQRS required standards. Detail of findings as noted in physical exam.  Advised patient importance of good routine lower extremity hygiene. Advised patient importance of evaluating for intact skin and pain free nail borders.  Advised patient to use mirror to evaluate plantar/ soles of feet for better visualization. Advised patient monitor and phone office to be seen if any cracking to skin, open lesions, painful nail borders or if nails become elongated prior to next visit. Advised patient importance of daily cleansing of lower extremities, followed by good skin cream to maintain normal hydration of skin. Also advised patient importance of close daily monitoring of blood sugar. Advised to regulate diet  and medications to maintain control of blood sugar in optimal range. Contact primary care provider if difficulties maintaining blood sugar levels.  Advised Patient of presence of Diabetes Mellitus condition.  Advised Patient risk of progression and worsening or improvement, then return of condition.  Will monitor condition for any change in future. Treat with most appropriate treatment pending status of condition.  Counseled and advised patient extensively on nature and ramifications of diabetes. Standard instructions given to patient for good diabetic foot care and maintenance. Advised importance of careful monitoring to avoid break down and complications secondary to diabetes. Advised patient importance of strict maintenance of blood sugar control. Advised patient of possible ominous results from neglect of condition, i.e.: amputation/ loss of digits, feet and legs, or even death.  Patient states understands counseling, will monitor closely, continue good hygiene and routine diabetic foot care. Patient will contact office is questions or problems.      An After Visit Summary was printed and given to the patient at discharge, including (if requested) any available informative/educational handouts regarding diagnosis, treatment, or medications. All questions were answered to patient/family satisfaction. Should symptoms fail to improve or worsen they agree to call or return to clinic or to go to the Emergency Department. Discussed the importance of following up with any needed screening tests/labs/specialist appointments and any requested follow-up recommended by me today. Importance of maintaining follow-up discussed and patient accepts that missed appointments can delay diagnosis and potentially lead to worsening of conditions.    Return in about 9 weeks (around 9/16/2021) for Toenail Care., or sooner if acute issues arise.    This document has been electronically signed by Lake Warner DPM on July 15, 2021  09:25 EDT

## 2021-09-16 ENCOUNTER — OFFICE VISIT (OUTPATIENT)
Dept: PODIATRY | Facility: CLINIC | Age: 81
End: 2021-09-16

## 2021-09-16 VITALS
HEART RATE: 75 BPM | WEIGHT: 270 LBS | SYSTOLIC BLOOD PRESSURE: 140 MMHG | BODY MASS INDEX: 38.65 KG/M2 | DIASTOLIC BLOOD PRESSURE: 73 MMHG | OXYGEN SATURATION: 97 % | HEIGHT: 70 IN | TEMPERATURE: 97.8 F

## 2021-09-16 DIAGNOSIS — Z79.4 TYPE 2 DIABETES MELLITUS WITHOUT COMPLICATION, WITH LONG-TERM CURRENT USE OF INSULIN (HCC): ICD-10-CM

## 2021-09-16 DIAGNOSIS — M20.11 HALLUX VALGUS OF RIGHT FOOT: ICD-10-CM

## 2021-09-16 DIAGNOSIS — M79.671 FOOT PAIN, BILATERAL: Primary | ICD-10-CM

## 2021-09-16 DIAGNOSIS — M79.672 FOOT PAIN, BILATERAL: Primary | ICD-10-CM

## 2021-09-16 DIAGNOSIS — B35.1 ONYCHOMYCOSIS: ICD-10-CM

## 2021-09-16 DIAGNOSIS — L60.0 ONYCHOCRYPTOSIS: ICD-10-CM

## 2021-09-16 DIAGNOSIS — E11.9 TYPE 2 DIABETES MELLITUS WITHOUT COMPLICATION, WITH LONG-TERM CURRENT USE OF INSULIN (HCC): ICD-10-CM

## 2021-09-16 PROCEDURE — G8404 LOW EXTEMITY NEUR EXAM DOCUM: HCPCS | Performed by: PODIATRIST

## 2021-09-16 PROCEDURE — 11721 DEBRIDE NAIL 6 OR MORE: CPT | Performed by: PODIATRIST

## 2021-09-16 NOTE — PROGRESS NOTES
[facility] - PODIATRY    Today's Date: 09/16/21    Patient Name: Claude E Johnson  MRN: [id number]  CSN: [id number]  PCP: Gail Aaron MD  Referring Provider: No ref. provider found    SUBJECTIVE     Chief Complaint   Patient presents with   • Left Foot - Nail Problem   • Right Foot - Nail Problem     HPI: Claude E Johnson, a 81 y.o.male, comes to clinic.    New, Established, New Problem:  established     Location:  Toenails    Duration:   Greater than five years    Onset:  Gradual    Nature:  sore with palpation.    Stable, worsening, improving:   Improving    Aggravating factors:  Pain with shoe gear and ambulation.    Previous Treatment:  debridement  __________________________________    New, Established, New Problem:  Established    Location:  bilateral feet    Duration:    Onset:  gradual    Nature:   IDDM     Stable, worsening, improving:  stable    Patient reported last blood glucose: 96  __________________________________    Patient reports the following medical changes since their last visit: Patient states with diet changes has lost 8 pounds recently.    No other pedal complaints at this time.    Patient denies any fevers, chills, nausea, vomiting, shortness of breathe, nor any other constitutional signs nor symptoms.       Last PCP Visit:  Gail Aaron MD, May 2021    Past Medical History:   Diagnosis Date   • Aneurysm (arteriovenous) of coronary vessels    • Arthritis    • Bilateral foot pain 07/03/2018   • Bunion    • Calcaneal spur of foot, left    • CKD (chronic kidney disease)    • Corns and callus    • Coronary artery disease    • HBP (high blood pressure)    • Heart attack (CMS/HCC)    • High cholesterol    • Hyperlipidemia    • Hypertension    • Ingrowing toenail    • Kidney problem    • Obesity    • Plantar fascial fibromatosis    • Polyneuropathy    • Tinea unguium 07/03/2018   • Type 2 diabetes mellitus (CMS/HCC)    • Type 2 diabetes mellitus with  polyneuropathy (CMS/HCC)      Past Surgical History:   Procedure Laterality Date   • ANKLE SURGERY     • COLONOSCOPY N/A 2020    Procedure: COLONOSCOPY to Cecum and TI with Cold Polypectomies;  Surgeon: Tyler Warner MD;  Location: [facility] ENDOSCOPY;  Service: Gastroenterology   • CORONARY ARTERY BYPASS GRAFT     • REPLACEMENT TOTAL KNEE Bilateral    • TOTAL SHOULDER REPLACEMENT Bilateral      Family History   Problem Relation Age of Onset   • Cancer Mother         ovarian   • Heart disease Father    • Cancer Sister         breast   • Lymphoma Sister         Non-Hodgkin's   • Cancer Brother         colon   • Heart disease Brother    • Ovarian cancer Other      Social History     Socioeconomic History   • Marital status:      Spouse name: Not on file   • Number of children: Not on file   • Years of education: Not on file   • Highest education level: Not on file   Tobacco Use   • Smoking status: Former Smoker     Packs/day: 2.00     Years: 50.00     Pack years: 100.00     Types: Cigarettes     Quit date:      Years since quittin.7   • Smokeless tobacco: Former User   • Tobacco comment: 8 yrs ago   Vaping Use   • Vaping Use: Never used   Substance and Sexual Activity   • Alcohol use: No   • Drug use: No   • Sexual activity: Defer     Allergies   Allergen Reactions   • Morphine Hives   • Latex Unknown - Low Severity   • Lisinopril Unknown - Low Severity     Other reaction(s): Renal impairment   • Morphine And Related Hives   • Nabumetone Rash     Current Outpatient Medications   Medication Sig Dispense Refill   • ACCU-CHEK JEFF test strip USE TO CHECK BLOOD SUGAR 4 TIMES DAILY E 11.8 200 each 6   • ACCU-CHEK SOFTCLIX LANCETS lancets 1 each by Other route 4 (Four) Times a Day. Use as instructed 400 each 3   • acetaminophen (TYLENOL) 325 MG tablet TAKE TWO TABLETS BY MOUTH FOUR TIMES A DAY AS NEEDED FOR PAIN     • amLODIPine (NORVASC) 10 MG tablet 1/2 tablet twice daily 30 tablet 5   • aspirin 325  "MG tablet Take  by mouth daily.     • atorvastatin (LIPITOR) 40 MG tablet TAKE 1 TABLET DAILY 90 tablet 3   • Blood Glucose Monitoring Suppl (ACCU-CHEK JEFF PLUS) w/Device kit USE TO CHECK BLOOD SUGAR 4 TIMES DAILY  E 11.8 1 kit 0   • fluticasone (FLONASE) 50 MCG/ACT nasal spray 2 sprays by Each Nare route Daily. 3 bottle 3   • furosemide (LASIX) 20 MG tablet Take 20 mg by mouth Daily.     • hydrALAZINE (APRESOLINE) 25 MG tablet Take 25 mg by mouth 3 (Three) Times a Day.  2   • hydrocortisone 2.5 % cream      • insulin NPH (NovoLIN N ReliOn) 100 UNIT/ML injection Inject 32 Units under the skin into the appropriate area as directed 2 (two) times a day. 50 mL 3   • Insulin Pen Needle (PEN NEEDLES) 29G X 12MM misc Inject 1 each under the skin 4 (Four) Times a Day. Use with Insulin 400 each 1   • insulin regular (NOVOLIN R RELION) 100 UNIT/ML injection INJECT 10 UNITS SUBCUTANEOUSLY BEFORE BREAKFAST AND 8 AT DINNER (Patient taking differently: Inject 12 Units under the skin into the appropriate area as directed. INJECT 12 UNITS SUBCUTANEOUSLY BEFORE BREAKFAST AND 8 AT DINNER) 10 mL 3   • Insulin Syringe (RELION INSULIN SYRINGE) 29G X 1/2\" 1 ML misc 4 times daily sc inj 200 each 3   • Lancets (ACCU-CHEK MULTICLIX) lancets USE TO TEST BLOOD SUGAR 4 TIMES DAILY  E 11.8 200 each 6   • Multiple Vitamin (MULTI VITAMIN DAILY PO) Take  by mouth.     • RELION INSULIN SYRINGE 1ML/31G 31G X 5/16\" 1 ML misc USE 1 SYRINGE 4 TIMES DAILY 200 each 1   • tamsulosin (FLOMAX) 0.4 MG capsule 24 hr capsule Take 1 capsule by mouth Every Night. 90 capsule 1     No current facility-administered medications for this visit.     Review of Systems   Skin:        Painful toenails   All other systems reviewed and are negative.      OBJECTIVE     Vitals:    09/16/21 0916   BP: 140/73   Pulse: 75   Temp: 97.8 °F (36.6 °C)   SpO2: 97%       Lab Results   Component Value Date    HGBA1C 8.60 (H) 10/22/2020       Lab Results   Component Value Date    " GLUCOSE 220 (H) 2019    CALCIUM 9.3 10/22/2020     10/22/2020    K 4.7 10/22/2020    CO2 25.7 10/22/2020     10/22/2020    BUN 20 10/22/2020    CREATININE 1.27 10/22/2020    EGFRIFAFRI 66 10/22/2020    EGFRIFNONA 54 (L) 10/22/2020    BCR 15.7 10/22/2020    ANIONGAP 11.5 2019       Patient seen in no apparent distress.      PHYSICAL EXAM:     Foot/Ankle Exam:       General:   Appearance: obesity and elderly    Orientation: AAOx3    Affect: appropriate    Gait: unimpaired    Shoe Gear:  Casual shoes    VASCULAR      Right Foot Vascularity   Dorsalis pedis:  1+  Posterior tibial:  1+  Skin Temperature: cool    Edema Gradin+ and pitting  CFT:  3  Pedal Hair Growth:  Absent  Varicosities: mild varicosities       Left Foot Vascularity   Dorsalis pedis:  1+  Posterior tibial:  1+  Skin Temperature: cool    Edema Grading:  Pitting and 1+  CFT:  3  Pedal Hair Growth:  Absent  Varicosities: mild varicosities        NEUROLOGIC     Right Foot Neurologic   Normal sensation    Light touch sensation:  Normal  Vibratory sensation:  Normal  Hot/Cold sensation: normal    Protective Sensation using [place]-Govind Monofilament:  10     Left Foot Neurologic   Normal sensation    Light touch sensation:  Normal  Vibratory sensation:  Normal  Hot/cold sensation: normal    Protective Sensation using [place]-Govind Monofilament:  10     MUSCULOSKELETAL      Right Foot Musculoskeletal   Arch:  Normal  Hallux valgus: Yes       Left Foot Musculoskeletal   Arch:  Normal     MUSCLE STRENGTH     Right Foot Muscle Strength   Normal strength    Foot dorsiflexion:  5  Foot plantar flexion:  5  Foot inversion:  5  Foot eversion:  5     Left Foot Muscle Strength   Normal strength    Foot dorsiflexion:  5  Foot plantar flexion:  5  Foot inversion:  5  Foot eversion:  5     RANGE OF MOTION      Right Foot Range of Motion   Foot and ankle ROM within normal limits       Left Foot Range of Motion   Foot and ankle ROM within  normal limits       DERMATOLOGIC     Right Foot Dermatologic   Skin: skin intact    Nails: onychomycosis, abnormally thick, subungual debris, dystrophic nails and ingrown toenail    Nails comment:  Toenails 1, 2, 3, 4, and 5     Left Foot Dermatologic   Skin: skin intact    Nails: onychomycosis, abnormally thick, subungual debris, dystrophic nails and ingrown toenail    Nails comment:  Toenails 1, 2, 3, 4, and 5      Diabetic Foot Exam Performed    ASSESSMENT/PLAN     Diagnoses and all orders for this visit:    1. Foot pain, bilateral (Primary)    2. Onychomycosis    3. Onychocryptosis    4. Hallux valgus of right foot    5. Type 2 diabetes mellitus without complication, with long-term current use of insulin (CMS/[facility])        Comprehensive lower extremity examination and evaluation was performed.    Discussed findings and treatment plan including risks, benefits, and treatment options with patient in detail. Patient agreed with treatment plan.    Toenails 1 through 5 bilaterally were debrided in thickness and length and then smoothed with a Dremel Tool.  Tolerated the procedure well without complications.    Diabetic foot exam performed and documented this date, compliant with CQM required standards. Detail of findings as noted in physical exam.  Lower extremity Neurologic exam for diabetic patient performed and documented this date, compliant with PQRS required standards. Detail of findings as noted in physical exam.  Advised patient importance of good routine lower extremity hygiene. Advised patient importance of evaluating for intact skin and pain free nail borders.  Advised patient to use mirror to evaluate plantar/ soles of feet for better visualization. Advised patient monitor and phone office to be seen if any cracking to skin, open lesions, painful nail borders or if nails become elongated prior to next visit. Advised patient importance of daily cleansing of lower extremities, followed by good skin cream to  maintain normal hydration of skin. Also advised patient importance of close daily monitoring of blood sugar. Advised to regulate diet and medications to maintain control of blood sugar in optimal range. Contact primary care provider if difficulties maintaining blood sugar levels.  Advised Patient of presence of Diabetes Mellitus condition.  Advised Patient risk of progression and worsening or improvement, then return of condition.  Will monitor condition for any change in future. Treat with most appropriate treatment pending status of condition.  Counseled and advised patient extensively on nature and ramifications of diabetes. Standard instructions given to patient for good diabetic foot care and maintenance. Advised importance of careful monitoring to avoid break down and complications secondary to diabetes. Advised patient importance of strict maintenance of blood sugar control. Advised patient of possible ominous results from neglect of condition, i.e.: amputation/ loss of digits, feet and legs, or even death.  Patient states understands counseling, will monitor closely, continue good hygiene and routine diabetic foot care. Patient will contact office is questions or problems.      An After Visit Summary was printed and given to the patient at discharge, including (if requested) any available informative/educational handouts regarding diagnosis, treatment, or medications. All questions were answered to patient/family satisfaction. Should symptoms fail to improve or worsen they agree to call or return to clinic or to go to the Emergency Department. Discussed the importance of following up with any needed screening tests/labs/specialist appointments and any requested follow-up recommended by me today. Importance of maintaining follow-up discussed and patient accepts that missed appointments can delay diagnosis and potentially lead to worsening of conditions.    Return in about 9 weeks (around 11/18/2021) for  Toenail Care., or sooner if acute issues arise.    This document has been electronically signed by Lake Warner DPM on September 16, 2021 09:55 EDT

## 2021-11-18 ENCOUNTER — OFFICE VISIT (OUTPATIENT)
Dept: PODIATRY | Facility: CLINIC | Age: 81
End: 2021-11-18

## 2021-11-18 VITALS
SYSTOLIC BLOOD PRESSURE: 123 MMHG | DIASTOLIC BLOOD PRESSURE: 62 MMHG | BODY MASS INDEX: 37.8 KG/M2 | HEART RATE: 90 BPM | TEMPERATURE: 97.5 F | WEIGHT: 264 LBS | HEIGHT: 70 IN | OXYGEN SATURATION: 98 %

## 2021-11-18 DIAGNOSIS — E11.9 TYPE 2 DIABETES MELLITUS WITHOUT COMPLICATION, WITH LONG-TERM CURRENT USE OF INSULIN (HCC): ICD-10-CM

## 2021-11-18 DIAGNOSIS — M79.672 FOOT PAIN, BILATERAL: Primary | ICD-10-CM

## 2021-11-18 DIAGNOSIS — M20.11 HALLUX VALGUS OF RIGHT FOOT: ICD-10-CM

## 2021-11-18 DIAGNOSIS — Z79.4 TYPE 2 DIABETES MELLITUS WITHOUT COMPLICATION, WITH LONG-TERM CURRENT USE OF INSULIN (HCC): ICD-10-CM

## 2021-11-18 DIAGNOSIS — M79.671 FOOT PAIN, BILATERAL: Primary | ICD-10-CM

## 2021-11-18 DIAGNOSIS — B35.1 ONYCHOMYCOSIS: ICD-10-CM

## 2021-11-18 DIAGNOSIS — L60.0 ONYCHOCRYPTOSIS: ICD-10-CM

## 2021-11-18 PROCEDURE — 11721 DEBRIDE NAIL 6 OR MORE: CPT | Performed by: PODIATRIST

## 2021-11-18 PROCEDURE — G8404 LOW EXTEMITY NEUR EXAM DOCUM: HCPCS | Performed by: PODIATRIST

## 2021-11-18 RX ORDER — SODIUM BICARBONATE 650 MG/1
TABLET ORAL
COMMUNITY
Start: 2021-09-30

## 2021-11-18 NOTE — PROGRESS NOTES
[facility] - PODIATRY    Today's Date: 11/18/21    Patient Name: Claude E Johnson  MRN: [id number]  CSN: [id number]  PCP: Gail Aaron MD  Referring Provider: No ref. provider found    SUBJECTIVE     Chief Complaint   Patient presents with   • Left Foot - Nail Problem   • Right Foot - Nail Problem     HPI: Claude E Johnson, a 81 y.o.male, comes to clinic.    New, Established, New Problem:  established     Location:  Toenails    Duration:   Greater than five years    Onset:  Gradual    Nature:  sore with palpation.    Stable, worsening, improving:   Stable    Aggravating factors:  Pain with shoe gear and ambulation.    Previous Treatment:  debridement  __________________________________    New, Established, New Problem:  Established    Location:  bilateral feet    Duration:    Onset:  gradual    Nature:   IDDM     Stable, worsening, improving:  stable    Patient reported last blood glucose: 150  __________________________________    Patient reports the following medical changes since their last visit: decrease in insulin dosage    No other pedal complaints at this time.    Patient denies any fevers, chills, nausea, vomiting, shortness of breathe, nor any other constitutional signs nor symptoms.       Last PCP Visit:  Gail Aaron MD, May 2021, appointment in December 2021.    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)      Past Surgical History:   Procedure Laterality Date   • ANKLE SURGERY     • COLONOSCOPY N/A 2020    Procedure: COLONOSCOPY to Cecum and TI with Cold Polypectomies;  Surgeon: Tyler Warner MD;  Location: [facility] ENDOSCOPY;  Service: Gastroenterology   • CORONARY ARTERY BYPASS GRAFT     • REPLACEMENT TOTAL KNEE Bilateral    • TOTAL SHOULDER REPLACEMENT Bilateral      Family History   Problem Relation Age of Onset   • Cancer Mother         ovarian   • Heart disease Father    • Cancer Sister         breast   • Lymphoma Sister         Non-Hodgkin's   • Cancer Brother         colon   • Heart disease Brother    • Ovarian cancer Other      Social History     Socioeconomic History   • Marital status:    Tobacco Use   • Smoking status: Former Smoker     Packs/day: 2.00     Years: 50.00     Pack years: 100.00     Types: Cigarettes     Quit date:      Years since quittin.8   • Smokeless tobacco: Former User   • Tobacco comment: 8 yrs ago   Vaping Use   • Vaping Use: Never used   Substance and Sexual Activity   • Alcohol use: No   • Drug use: No   • Sexual activity: Defer     Allergies   Allergen Reactions   • Morphine Hives   • Latex Unknown - Low Severity   • Lisinopril Unknown - Low Severity     Other reaction(s): Renal impairment   • Morphine And Related Hives   • Nabumetone Rash     Current Outpatient Medications   Medication Sig Dispense Refill   • ACCU-CHEK JEFF test strip USE TO CHECK BLOOD SUGAR 4 TIMES DAILY E 11.8 200 each 6   • ACCU-CHEK SOFTCLIX LANCETS lancets 1 each by Other route 4 (Four) Times a Day. Use as instructed 400 each 3   • acetaminophen (TYLENOL) 325 MG tablet TAKE TWO TABLETS BY MOUTH FOUR TIMES A DAY AS NEEDED FOR PAIN     • amLODIPine (NORVASC) 10 MG tablet 1/2 tablet twice daily 30 tablet 5   • aspirin 325 MG tablet Take  by mouth daily.     • atorvastatin (LIPITOR) 40 MG tablet TAKE 1 TABLET DAILY 90 tablet 3   • Blood Glucose Monitoring Suppl (ACCU-CHEK JEFF  "PLUS) w/Device kit USE TO CHECK BLOOD SUGAR 4 TIMES DAILY  E 11.8 1 kit 0   • fluticasone (FLONASE) 50 MCG/ACT nasal spray 2 sprays by Each Nare route Daily. 3 bottle 3   • furosemide (LASIX) 20 MG tablet Take 20 mg by mouth Daily.     • hydrALAZINE (APRESOLINE) 25 MG tablet Take 25 mg by mouth 3 (Three) Times a Day.  2   • hydrocortisone 2.5 % cream      • insulin NPH (NovoLIN N ReliOn) 100 UNIT/ML injection Inject 32 Units under the skin into the appropriate area as directed 2 (two) times a day. 50 mL 3   • Insulin Pen Needle (PEN NEEDLES) 29G X 12MM misc Inject 1 each under the skin 4 (Four) Times a Day. Use with Insulin 400 each 1   • insulin regular (NOVOLIN R RELION) 100 UNIT/ML injection INJECT 10 UNITS SUBCUTANEOUSLY BEFORE BREAKFAST AND 8 AT DINNER (Patient taking differently: Inject 12 Units under the skin into the appropriate area as directed. INJECT 12 UNITS SUBCUTANEOUSLY BEFORE BREAKFAST AND 8 AT DINNER) 10 mL 3   • Insulin Syringe (RELION INSULIN SYRINGE) 29G X 1/2\" 1 ML misc 4 times daily sc inj 200 each 3   • Lancets (ACCU-CHEK MULTICLIX) lancets USE TO TEST BLOOD SUGAR 4 TIMES DAILY  E 11.8 200 each 6   • Multiple Vitamin (MULTI VITAMIN DAILY PO) Take  by mouth.     • RELION INSULIN SYRINGE 1ML/31G 31G X 5/16\" 1 ML misc USE 1 SYRINGE 4 TIMES DAILY 200 each 1   • sodium bicarbonate 650 MG tablet      • tamsulosin (FLOMAX) 0.4 MG capsule 24 hr capsule Take 1 capsule by mouth Every Night. 90 capsule 1     No current facility-administered medications for this visit.     Review of Systems   Skin:        Painful toenails   All other systems reviewed and are negative.      OBJECTIVE     Vitals:    11/18/21 0923   BP: 123/62   Pulse: 90   Temp: 97.5 °F (36.4 °C)   SpO2: 98%       Lab Results   Component Value Date    HGBA1C 8.60 (H) 10/22/2020       Lab Results   Component Value Date    GLUCOSE 143 (H) 10/22/2020    CALCIUM 9.3 10/22/2020     10/22/2020    K 4.7 10/22/2020    CO2 25.7 10/22/2020    "  10/22/2020    BUN 20 10/22/2020    CREATININE 1.27 10/22/2020    EGFRIFAFRI 66 10/22/2020    EGFRIFNONA 54 (L) 10/22/2020    BCR 15.7 10/22/2020    ANIONGAP 11.5 2019       Patient seen in no apparent distress.      PHYSICAL EXAM:     Foot/Ankle Exam:       General:   Diabetic Foot Exam Performed    Appearance: obesity and elderly    Orientation: AAOx3    Affect: appropriate    Gait: unimpaired    Shoe Gear:  Casual shoes    VASCULAR      Right Foot Vascularity   Dorsalis pedis:  1+  Posterior tibial:  1+  Skin Temperature: cool    Edema Gradin+ and pitting  CFT:  3  Pedal Hair Growth:  Absent  Varicosities: mild varicosities       Left Foot Vascularity   Dorsalis pedis:  1+  Posterior tibial:  1+  Skin Temperature: cool    Edema Grading:  Pitting and 1+  CFT:  3  Pedal Hair Growth:  Absent  Varicosities: mild varicosities        NEUROLOGIC     Right Foot Neurologic   Normal sensation    Light touch sensation:  Normal  Vibratory sensation:  Normal  Hot/Cold sensation: normal    Protective Sensation using Hailey-Govind Monofilament:  10     Left Foot Neurologic   Normal sensation    Light touch sensation:  Normal  Vibratory sensation:  Normal  Hot/cold sensation: normal    Protective Sensation using Hailey-Govind Monofilament:  10     MUSCULOSKELETAL      Right Foot Musculoskeletal   Arch:  Normal  Hallux valgus: Yes       Left Foot Musculoskeletal   Arch:  Normal     MUSCLE STRENGTH     Right Foot Muscle Strength   Normal strength    Foot dorsiflexion:  5  Foot plantar flexion:  5  Foot inversion:  5  Foot eversion:  5     Left Foot Muscle Strength   Normal strength    Foot dorsiflexion:  5  Foot plantar flexion:  5  Foot inversion:  5  Foot eversion:  5     RANGE OF MOTION      Right Foot Range of Motion   Foot and ankle ROM within normal limits       Left Foot Range of Motion   Foot and ankle ROM within normal limits       DERMATOLOGIC     Right Foot Dermatologic   Skin: skin intact     Nails: onychomycosis, abnormally thick, subungual debris, dystrophic nails and ingrown toenail    Nails comment:  Toenails 1, 2, 3, 4, and 5     Left Foot Dermatologic   Skin: skin intact    Nails: onychomycosis, abnormally thick, subungual debris, dystrophic nails and ingrown toenail    Nails comment:  Toenails 1, 2, 3, 4, and 5      Diabetic Foot Exam Performed    ASSESSMENT/PLAN     Diagnoses and all orders for this visit:    1. Foot pain, bilateral (Primary)    2. Onychomycosis    3. Onychocryptosis    4. Hallux valgus of right foot    5. Type 2 diabetes mellitus without complication, with long-term current use of insulin (HCC)        Comprehensive lower extremity examination and evaluation was performed.    Discussed findings and treatment plan including risks, benefits, and treatment options with patient in detail. Patient agreed with treatment plan.    Toenails 1 through 5 bilaterally were debrided in thickness and length and then smoothed with a Dremel Tool.  Tolerated the procedure well without complications.    Diabetic foot exam performed and documented this date, compliant with CQM required standards. Detail of findings as noted in physical exam.  Lower extremity Neurologic exam for diabetic patient performed and documented this date, compliant with PQRS required standards. Detail of findings as noted in physical exam.  Advised patient importance of good routine lower extremity hygiene. Advised patient importance of evaluating for intact skin and pain free nail borders.  Advised patient to use mirror to evaluate plantar/ soles of feet for better visualization. Advised patient monitor and phone office to be seen if any cracking to skin, open lesions, painful nail borders or if nails become elongated prior to next visit. Advised patient importance of daily cleansing of lower extremities, followed by good skin cream to maintain normal hydration of skin. Also advised patient importance of close daily  monitoring of blood sugar. Advised to regulate diet and medications to maintain control of blood sugar in optimal range. Contact primary care provider if difficulties maintaining blood sugar levels.  Advised Patient of presence of Diabetes Mellitus condition.  Advised Patient risk of progression and worsening or improvement, then return of condition.  Will monitor condition for any change in future. Treat with most appropriate treatment pending status of condition.  Counseled and advised patient extensively on nature and ramifications of diabetes. Standard instructions given to patient for good diabetic foot care and maintenance. Advised importance of careful monitoring to avoid break down and complications secondary to diabetes. Advised patient importance of strict maintenance of blood sugar control. Advised patient of possible ominous results from neglect of condition, i.e.: amputation/ loss of digits, feet and legs, or even death.  Patient states understands counseling, will monitor closely, continue good hygiene and routine diabetic foot care. Patient will contact office is questions or problems.      An After Visit Summary was printed and given to the patient at discharge, including (if requested) any available informative/educational handouts regarding diagnosis, treatment, or medications. All questions were answered to patient/family satisfaction. Should symptoms fail to improve or worsen they agree to call or return to clinic or to go to the Emergency Department. Discussed the importance of following up with any needed screening tests/labs/specialist appointments and any requested follow-up recommended by me today. Importance of maintaining follow-up discussed and patient accepts that missed appointments can delay diagnosis and potentially lead to worsening of conditions.    Return in about 9 weeks (around 1/20/2022) for Toenail Care., or sooner if acute issues arise.    This document has been electronically  signed by Lake Warner DPM on November 18, 2021 09:52 EST

## 2021-12-13 ENCOUNTER — TELEPHONE (OUTPATIENT)
Dept: PODIATRY | Facility: CLINIC | Age: 81
End: 2021-12-13

## 2021-12-13 NOTE — TELEPHONE ENCOUNTER
Provider: DR. TAN    Caller: PATIENT    Relationship to Patient: SELF    Phone Number:  [phone]    Reason for Call: PT. CALLING TO LET DR. TAN AND OFFICE KNOW THAT HE CANCELLED HIS APPT. IN January 2022.   STATES [facility] [place] [facility]   THAT IT IS CLOSER TO HOME AND EASIER FOR HIM TO DRIVE TO.   HE WANTED TO LET DR. TAN AND OFFICE KNOW THAT HE APPRECIATES EVERYTHING EVERYONE HAS DONE FOR HIM AND TO THANK THEM FOR HIS CARE.

## (undated) DEVICE — CANN NASL CO2 TRULINK W/O2 A/

## (undated) DEVICE — ADAPT CLN BIOGUARD AIR/H2O DISP

## (undated) DEVICE — TUBING, SUCTION, 1/4" X 10', STRAIGHT: Brand: MEDLINE

## (undated) DEVICE — KT ORCA ORCAPOD DISP STRL

## (undated) DEVICE — SINGLE-USE BIOPSY FORCEPS: Brand: RADIAL JAW 4

## (undated) DEVICE — THE TORRENT IRRIGATION SCOPE CONNECTOR IS USED WITH THE TORRENT IRRIGATION TUBING TO PROVIDE IRRIGATION FLUIDS SUCH AS STERILE WATER DURING GASTROINTESTINAL ENDOSCOPIC PROCEDURES WHEN USED IN CONJUNCTION WITH AN IRRIGATION PUMP (OR ELECTROSURGICAL UNIT).: Brand: TORRENT

## (undated) DEVICE — SENSR O2 OXIMAX FNGR A/ 18IN NONSTR